# Patient Record
Sex: FEMALE | Race: WHITE | NOT HISPANIC OR LATINO | Employment: UNEMPLOYED | URBAN - METROPOLITAN AREA
[De-identification: names, ages, dates, MRNs, and addresses within clinical notes are randomized per-mention and may not be internally consistent; named-entity substitution may affect disease eponyms.]

---

## 2017-03-02 ENCOUNTER — CONVERSION ENCOUNTER (OUTPATIENT)
Dept: RHEUMATOLOGY | Facility: CLINIC | Age: 62
End: 2017-03-02

## 2017-03-02 LAB
ALBUMIN SERPL-MCNC: 4.7 G/DL (ref 3.6–5.1)
ALBUMIN/GLOB SERPL: ABNORMAL {RATIO} (ref 1–2.5)
ALP SERPL-CCNC: 106 UNITS/L (ref 33–130)
ALT SERPL-CCNC: 20 UNITS/L (ref 6–29)
ANA SER QL IA: NEGATIVE
ANTI-CARDIOLIPIN IGG ANTIBODY: <14
ANTI-CARDIOLIPIN IGM ANTIBODY: <12
AST SERPL-CCNC: 16 UNITS/L (ref 10–35)
BASOPHILS # BLD AUTO: ABNORMAL 10*3/MM3 (ref 0–200)
BASOPHILS NFR BLD AUTO: 0.2 %
BILIRUB SERPL-MCNC: 0.6 MG/DL (ref 0.2–1.2)
BUN SERPL-MCNC: 12 MG/DL (ref 7–25)
BUN/CREAT SERPL: ABNORMAL (ref 6–22)
C3 SERPL-MCNC: 173 MG/DL (ref 90–180)
C4 SERPL-MCNC: 31 MG/DL (ref 16–47)
CALCIUM SERPL-MCNC: 9.8 MG/DL (ref 8.6–10.4)
CARDIOLIPIN IGA SER IA-ACNC: <11
CHLORIDE SERPL-SCNC: 104 MMOL/L (ref 98–110)
CO2 CONTENT VENOUS: 30 MMOL/L (ref 20–31)
CONV COMMENT: 0.98
CONV NEUTROPHILS/100 LEUKOCYTES IN BODY FLUID BY MANUAL COUNT: 65.1 %
CONV TOTAL PROTEIN: 7.7 G/DL (ref 6.1–8.1)
CREAT UR-MCNC: 0.73 MG/DL (ref 0.5–0.99)
CRP SERPL-MCNC: 0.59 MG/DL
EOSINOPHIL # BLD AUTO: 0.6 %
EOSINOPHIL # BLD AUTO: ABNORMAL 10*3/MM3 (ref 15–500)
ERYTHROCYTE [DISTWIDTH] IN BLOOD BY AUTOMATED COUNT: 13.6 % (ref 11–15)
ERYTHROCYTE [SEDIMENTATION RATE] IN BLOOD BY WESTERGREN METHOD: 17 MM/HR
GLOBULIN UR ELPH-MCNC: ABNORMAL G/DL (ref 1.9–3.7)
GLUCOSE SERPL-MCNC: 131 MG/DL (ref 65–99)
HBV CORE AB SER DONR QL IA: ABNORMAL
HBV CORE AB SER DONR QL IA: ABNORMAL
HBV SURFACE AB SER QL: ABNORMAL
HBV SURFACE AG SERPL QL IA: ABNORMAL
HCT VFR BLD AUTO: 44.5 % (ref 35–45)
HCV AB SER DONR QL: ABNORMAL
HGB BLD-MCNC: 14.6 G/DL (ref 11.7–15.5)
LYMPHOCYTES # BLD AUTO: ABNORMAL 10*3/MM3 (ref 850–3900)
LYMPHOCYTES NFR BLD AUTO: 29.2 %
MCH RBC QN AUTO: 28 PG (ref 27–33)
MCHC RBC AUTO-ENTMCNC: ABNORMAL % (ref 32–36)
MCV RBC AUTO: 85.2 FL (ref 80–100)
MONOCYTES # BLD AUTO: ABNORMAL 10*3/MICROLITER (ref 200–950)
MONOCYTES NFR BLD AUTO: 4.9 %
NEUTROPHILS # BLD AUTO: ABNORMAL 10*3/MM3 (ref 1500–7800)
PLATELET # BLD AUTO: ABNORMAL 10*3/MM3 (ref 140–400)
PMV BLD AUTO: 10.8 FL (ref 7.5–12.5)
POTASSIUM SERPL-SCNC: 4 MMOL/L (ref 3.5–5.3)
RBC # BLD AUTO: ABNORMAL 10*6/MM3 (ref 3.8–5.1)
SODIUM SERPL-SCNC: 140 MMOL/L (ref 135–146)
T4 SERPL-MCNC: 9.5 MCG/DL (ref 4.5–12)
THYROGLOB SERPL-MCNC: ABNORMAL NG/ML
TSH SERPL-ACNC: 1.11 MICROINTL UNITS/ML (ref 0.4–4.5)
WBC # BLD AUTO: ABNORMAL K/UL (ref 3.8–10.8)

## 2018-03-14 ENCOUNTER — ON CAMPUS - OUTPATIENT (OUTPATIENT)
Dept: URBAN - METROPOLITAN AREA HOSPITAL 85 | Facility: HOSPITAL | Age: 63
End: 2018-03-14

## 2018-03-14 ENCOUNTER — HOSPITAL ENCOUNTER (OUTPATIENT)
Dept: PREOP | Facility: HOSPITAL | Age: 63
Setting detail: HOSPITAL OUTPATIENT SURGERY
Discharge: HOME | End: 2018-03-14
Attending: INTERNAL MEDICINE | Admitting: INTERNAL MEDICINE

## 2018-03-14 DIAGNOSIS — R93.3 ABNORMAL FINDINGS ON DIAGNOSTIC IMAGING OF OTHER PARTS OF DI: ICD-10-CM

## 2018-03-14 DIAGNOSIS — K62.5 HEMORRHAGE OF ANUS AND RECTUM: ICD-10-CM

## 2018-03-14 DIAGNOSIS — D12.4 BENIGN NEOPLASM OF DESCENDING COLON: ICD-10-CM

## 2018-03-14 DIAGNOSIS — Z91.19 PATIENT'S NONCOMPLIANCE WITH OTHER MEDICAL TREATMENT AND REG: ICD-10-CM

## 2018-03-14 DIAGNOSIS — R19.4 CHANGE IN BOWEL HABIT: ICD-10-CM

## 2018-03-14 PROCEDURE — 45385 COLONOSCOPY W/LESION REMOVAL: CPT | Performed by: INTERNAL MEDICINE

## 2018-03-16 ENCOUNTER — HOSPITAL ENCOUNTER (OUTPATIENT)
Dept: PREOP | Facility: HOSPITAL | Age: 63
Setting detail: HOSPITAL OUTPATIENT SURGERY
Discharge: HOME | End: 2018-03-16
Attending: INTERNAL MEDICINE | Admitting: INTERNAL MEDICINE

## 2018-03-16 PROCEDURE — 45385 COLONOSCOPY W/LESION REMOVAL: CPT | Mod: 76 | Performed by: INTERNAL MEDICINE

## 2021-11-18 ENCOUNTER — APPOINTMENT (OUTPATIENT)
Dept: CT IMAGING | Facility: HOSPITAL | Age: 66
End: 2021-11-18

## 2021-11-18 ENCOUNTER — HOSPITAL ENCOUNTER (INPATIENT)
Facility: HOSPITAL | Age: 66
LOS: 3 days | Discharge: NURSING, REHAB OR LONG-TERM CARE | End: 2021-11-21
Attending: INTERNAL MEDICINE | Admitting: INTERNAL MEDICINE

## 2021-11-18 DIAGNOSIS — R10.32 LEFT LOWER QUADRANT ABDOMINAL PAIN: Primary | ICD-10-CM

## 2021-11-18 DIAGNOSIS — F41.1 GAD (GENERALIZED ANXIETY DISORDER): ICD-10-CM

## 2021-11-18 DIAGNOSIS — R50.9 FEVER, UNSPECIFIED FEVER CAUSE: ICD-10-CM

## 2021-11-18 DIAGNOSIS — N30.01 ACUTE CYSTITIS WITH HEMATURIA: ICD-10-CM

## 2021-11-18 DIAGNOSIS — K65.1 INTRA-ABDOMINAL ABSCESS (HCC): ICD-10-CM

## 2021-11-18 DIAGNOSIS — G89.4 CHRONIC PAIN SYNDROME: ICD-10-CM

## 2021-11-18 DIAGNOSIS — Z85.44 HISTORY OF CANCER OF VULVA: ICD-10-CM

## 2021-11-18 PROBLEM — C51.9 MALIGNANT NEOPLASM OF VULVA (HCC): Chronic | Status: ACTIVE | Noted: 2021-11-18

## 2021-11-18 PROBLEM — C80.1: Chronic | Status: ACTIVE | Noted: 2021-08-30

## 2021-11-18 PROBLEM — N39.0 ACUTE UTI (URINARY TRACT INFECTION): Status: ACTIVE | Noted: 2021-11-18

## 2021-11-18 PROBLEM — D75.839 THROMBOCYTOSIS: Status: ACTIVE | Noted: 2021-11-18

## 2021-11-18 PROBLEM — L02.211 ABSCESS OF ABDOMINAL WALL: Status: ACTIVE | Noted: 2021-11-18

## 2021-11-18 PROBLEM — E11.9 TYPE 2 DIABETES MELLITUS (HCC): Status: ACTIVE | Noted: 2021-06-02

## 2021-11-18 PROBLEM — D64.9 ANEMIA: Status: ACTIVE | Noted: 2021-11-18

## 2021-11-18 PROBLEM — C51.9 MALIGNANT NEOPLASM OF VULVA: Status: ACTIVE | Noted: 2021-11-18

## 2021-11-18 PROBLEM — E11.9 TYPE 2 DIABETES MELLITUS (HCC): Chronic | Status: ACTIVE | Noted: 2021-06-02

## 2021-11-18 PROBLEM — C80.1: Status: ACTIVE | Noted: 2021-08-30

## 2021-11-18 LAB
ALBUMIN SERPL-MCNC: 2 G/DL (ref 3.5–5.2)
ALBUMIN/GLOB SERPL: 0.4 G/DL
ALP SERPL-CCNC: 175 U/L (ref 39–117)
ALT SERPL W P-5'-P-CCNC: <5 U/L (ref 1–33)
ANION GAP SERPL CALCULATED.3IONS-SCNC: 11 MMOL/L (ref 5–15)
AST SERPL-CCNC: 9 U/L (ref 1–32)
BACTERIA UR QL AUTO: ABNORMAL /HPF
BASOPHILS # BLD AUTO: 0.1 10*3/MM3 (ref 0–0.2)
BASOPHILS NFR BLD AUTO: 0.7 % (ref 0–1.5)
BILIRUB SERPL-MCNC: 0.5 MG/DL (ref 0–1.2)
BILIRUB UR QL STRIP: NEGATIVE
BUN SERPL-MCNC: 15 MG/DL (ref 8–23)
BUN/CREAT SERPL: 20.8 (ref 7–25)
CALCIUM SPEC-SCNC: 8.5 MG/DL (ref 8.6–10.5)
CHLORIDE SERPL-SCNC: 88 MMOL/L (ref 98–107)
CLARITY UR: ABNORMAL
CO2 SERPL-SCNC: 35 MMOL/L (ref 22–29)
COLOR UR: ABNORMAL
CREAT SERPL-MCNC: 0.72 MG/DL (ref 0.57–1)
D-LACTATE SERPL-SCNC: 1.2 MMOL/L (ref 0.5–2)
DEPRECATED RDW RBC AUTO: 45.9 FL (ref 37–54)
EOSINOPHIL # BLD AUTO: 0 10*3/MM3 (ref 0–0.4)
EOSINOPHIL NFR BLD AUTO: 0 % (ref 0.3–6.2)
ERYTHROCYTE [DISTWIDTH] IN BLOOD BY AUTOMATED COUNT: 17.6 % (ref 12.3–15.4)
FERRITIN SERPL-MCNC: 555.6 NG/ML (ref 13–150)
FOLATE SERPL-MCNC: 4.09 NG/ML (ref 4.78–24.2)
GFR SERPL CREATININE-BSD FRML MDRD: 81 ML/MIN/1.73
GLOBULIN UR ELPH-MCNC: 5.3 GM/DL
GLUCOSE BLDC GLUCOMTR-MCNC: 180 MG/DL (ref 70–105)
GLUCOSE BLDC GLUCOMTR-MCNC: 193 MG/DL (ref 70–105)
GLUCOSE BLDC GLUCOMTR-MCNC: 205 MG/DL (ref 70–105)
GLUCOSE SERPL-MCNC: 191 MG/DL (ref 65–99)
GLUCOSE UR STRIP-MCNC: NEGATIVE MG/DL
HCT VFR BLD AUTO: 28.4 % (ref 34–46.6)
HGB BLD-MCNC: 8.9 G/DL (ref 12–15.9)
HGB UR QL STRIP.AUTO: ABNORMAL
HOLD SPECIMEN: NORMAL
HOLD SPECIMEN: NORMAL
HYALINE CASTS UR QL AUTO: ABNORMAL /LPF
IRON 24H UR-MRATE: 16 MCG/DL (ref 37–145)
KETONES UR QL STRIP: NEGATIVE
LEUKOCYTE ESTERASE UR QL STRIP.AUTO: ABNORMAL
LIPASE SERPL-CCNC: 8 U/L (ref 13–60)
LYMPHOCYTES # BLD AUTO: 1.1 10*3/MM3 (ref 0.7–3.1)
LYMPHOCYTES NFR BLD AUTO: 5.6 % (ref 19.6–45.3)
MAGNESIUM SERPL-MCNC: 1.6 MG/DL (ref 1.6–2.4)
MCH RBC QN AUTO: 23.2 PG (ref 26.6–33)
MCHC RBC AUTO-ENTMCNC: 31.4 G/DL (ref 31.5–35.7)
MCV RBC AUTO: 73.9 FL (ref 79–97)
MONOCYTES # BLD AUTO: 1.4 10*3/MM3 (ref 0.1–0.9)
MONOCYTES NFR BLD AUTO: 7.2 % (ref 5–12)
NEUTROPHILS NFR BLD AUTO: 16.9 10*3/MM3 (ref 1.7–7)
NEUTROPHILS NFR BLD AUTO: 86.5 % (ref 42.7–76)
NITRITE UR QL STRIP: NEGATIVE
NRBC BLD AUTO-RTO: 0.1 /100 WBC (ref 0–0.2)
PH UR STRIP.AUTO: 5.5 [PH] (ref 5–8)
PLATELET # BLD AUTO: 517 10*3/MM3 (ref 140–450)
PMV BLD AUTO: 6.8 FL (ref 6–12)
POTASSIUM SERPL-SCNC: 3.5 MMOL/L (ref 3.5–5.2)
PROT SERPL-MCNC: 7.3 G/DL (ref 6–8.5)
PROT UR QL STRIP: ABNORMAL
RBC # BLD AUTO: 3.85 10*6/MM3 (ref 3.77–5.28)
RBC # UR STRIP: ABNORMAL /HPF
REF LAB TEST METHOD: ABNORMAL
SARS-COV-2 RNA PNL SPEC NAA+PROBE: NOT DETECTED
SODIUM SERPL-SCNC: 134 MMOL/L (ref 136–145)
SP GR UR STRIP: 1.02 (ref 1–1.03)
SQUAMOUS #/AREA URNS HPF: ABNORMAL /HPF
UROBILINOGEN UR QL STRIP: ABNORMAL
VIT B12 BLD-MCNC: 319 PG/ML (ref 211–946)
WBC # UR STRIP: ABNORMAL /HPF
WBC NRBC COR # BLD: 19.5 10*3/MM3 (ref 3.4–10.8)
WHOLE BLOOD HOLD SPECIMEN: NORMAL
WHOLE BLOOD HOLD SPECIMEN: NORMAL

## 2021-11-18 PROCEDURE — 99221 1ST HOSP IP/OBS SF/LOW 40: CPT | Performed by: SURGERY

## 2021-11-18 PROCEDURE — 83605 ASSAY OF LACTIC ACID: CPT

## 2021-11-18 PROCEDURE — 81001 URINALYSIS AUTO W/SCOPE: CPT | Performed by: NURSE PRACTITIONER

## 2021-11-18 PROCEDURE — 83036 HEMOGLOBIN GLYCOSYLATED A1C: CPT | Performed by: NURSE PRACTITIONER

## 2021-11-18 PROCEDURE — 25010000002 HYDROMORPHONE PER 4 MG: Performed by: NURSE PRACTITIONER

## 2021-11-18 PROCEDURE — 74177 CT ABD & PELVIS W/CONTRAST: CPT

## 2021-11-18 PROCEDURE — 87147 CULTURE TYPE IMMUNOLOGIC: CPT | Performed by: RADIOLOGY

## 2021-11-18 PROCEDURE — 75989 ABSCESS DRAINAGE UNDER X-RAY: CPT

## 2021-11-18 PROCEDURE — 49406 IMAGE CATH FLUID PERI/RETRO: CPT

## 2021-11-18 PROCEDURE — 82728 ASSAY OF FERRITIN: CPT | Performed by: NURSE PRACTITIONER

## 2021-11-18 PROCEDURE — 87635 SARS-COV-2 COVID-19 AMP PRB: CPT | Performed by: INTERNAL MEDICINE

## 2021-11-18 PROCEDURE — 80053 COMPREHEN METABOLIC PANEL: CPT | Performed by: NURSE PRACTITIONER

## 2021-11-18 PROCEDURE — 36415 COLL VENOUS BLD VENIPUNCTURE: CPT

## 2021-11-18 PROCEDURE — 99222 1ST HOSP IP/OBS MODERATE 55: CPT | Performed by: NURSE PRACTITIONER

## 2021-11-18 PROCEDURE — 99284 EMERGENCY DEPT VISIT MOD MDM: CPT

## 2021-11-18 PROCEDURE — 82746 ASSAY OF FOLIC ACID SERUM: CPT | Performed by: NURSE PRACTITIONER

## 2021-11-18 PROCEDURE — 25010000002 MIDAZOLAM PER 1 MG: Performed by: RADIOLOGY

## 2021-11-18 PROCEDURE — 83735 ASSAY OF MAGNESIUM: CPT | Performed by: NURSE PRACTITIONER

## 2021-11-18 PROCEDURE — 25010000002 FENTANYL CITRATE (PF) 50 MCG/ML SOLUTION: Performed by: RADIOLOGY

## 2021-11-18 PROCEDURE — 25010000002 PIPERACILLIN SOD-TAZOBACTAM PER 1 G: Performed by: NURSE PRACTITIONER

## 2021-11-18 PROCEDURE — 99152 MOD SED SAME PHYS/QHP 5/>YRS: CPT

## 2021-11-18 PROCEDURE — 83540 ASSAY OF IRON: CPT | Performed by: NURSE PRACTITIONER

## 2021-11-18 PROCEDURE — 85025 COMPLETE CBC W/AUTO DIFF WBC: CPT | Performed by: NURSE PRACTITIONER

## 2021-11-18 PROCEDURE — 87086 URINE CULTURE/COLONY COUNT: CPT | Performed by: INTERNAL MEDICINE

## 2021-11-18 PROCEDURE — 82607 VITAMIN B-12: CPT | Performed by: NURSE PRACTITIONER

## 2021-11-18 PROCEDURE — 0 LIDOCAINE 1 % SOLUTION: Performed by: RADIOLOGY

## 2021-11-18 PROCEDURE — 87070 CULTURE OTHR SPECIMN AEROBIC: CPT | Performed by: RADIOLOGY

## 2021-11-18 PROCEDURE — 99153 MOD SED SAME PHYS/QHP EA: CPT

## 2021-11-18 PROCEDURE — 82962 GLUCOSE BLOOD TEST: CPT

## 2021-11-18 PROCEDURE — 0W9G30Z DRAINAGE OF PERITONEAL CAVITY WITH DRAINAGE DEVICE, PERCUTANEOUS APPROACH: ICD-10-PCS | Performed by: RADIOLOGY

## 2021-11-18 PROCEDURE — C1769 GUIDE WIRE: HCPCS

## 2021-11-18 PROCEDURE — C1819 TISSUE LOCALIZATION-EXCISION: HCPCS

## 2021-11-18 PROCEDURE — 87205 SMEAR GRAM STAIN: CPT | Performed by: RADIOLOGY

## 2021-11-18 PROCEDURE — 0 IOPAMIDOL PER 1 ML: Performed by: NURSE PRACTITIONER

## 2021-11-18 PROCEDURE — 83690 ASSAY OF LIPASE: CPT | Performed by: NURSE PRACTITIONER

## 2021-11-18 PROCEDURE — 87040 BLOOD CULTURE FOR BACTERIA: CPT | Performed by: NURSE PRACTITIONER

## 2021-11-18 PROCEDURE — C1729 CATH, DRAINAGE: HCPCS

## 2021-11-18 RX ORDER — ALUMINA, MAGNESIA, AND SIMETHICONE 2400; 2400; 240 MG/30ML; MG/30ML; MG/30ML
15 SUSPENSION ORAL EVERY 6 HOURS PRN
Status: DISCONTINUED | OUTPATIENT
Start: 2021-11-18 | End: 2021-11-21 | Stop reason: HOSPADM

## 2021-11-18 RX ORDER — POTASSIUM CHLORIDE 20 MEQ/1
40 TABLET, EXTENDED RELEASE ORAL AS NEEDED
Status: DISCONTINUED | OUTPATIENT
Start: 2021-11-18 | End: 2021-11-21 | Stop reason: HOSPADM

## 2021-11-18 RX ORDER — POTASSIUM CHLORIDE 7.45 MG/ML
10 INJECTION INTRAVENOUS
Status: DISCONTINUED | OUTPATIENT
Start: 2021-11-18 | End: 2021-11-21 | Stop reason: HOSPADM

## 2021-11-18 RX ORDER — ONDANSETRON 4 MG/1
4 TABLET, FILM COATED ORAL EVERY 6 HOURS PRN
Status: DISCONTINUED | OUTPATIENT
Start: 2021-11-18 | End: 2021-11-21 | Stop reason: HOSPADM

## 2021-11-18 RX ORDER — LIDOCAINE HYDROCHLORIDE 10 MG/ML
INJECTION, SOLUTION INFILTRATION; PERINEURAL
Status: COMPLETED | OUTPATIENT
Start: 2021-11-18 | End: 2021-11-18

## 2021-11-18 RX ORDER — CHOLECALCIFEROL (VITAMIN D3) 125 MCG
5 CAPSULE ORAL NIGHTLY PRN
Status: DISCONTINUED | OUTPATIENT
Start: 2021-11-18 | End: 2021-11-21 | Stop reason: HOSPADM

## 2021-11-18 RX ORDER — LIDOCAINE HYDROCHLORIDE 20 MG/ML
JELLY TOPICAL AS NEEDED
Status: DISCONTINUED | OUTPATIENT
Start: 2021-11-18 | End: 2021-11-21 | Stop reason: HOSPADM

## 2021-11-18 RX ORDER — MAGNESIUM SULFATE HEPTAHYDRATE 40 MG/ML
4 INJECTION, SOLUTION INTRAVENOUS AS NEEDED
Status: DISCONTINUED | OUTPATIENT
Start: 2021-11-18 | End: 2021-11-21 | Stop reason: HOSPADM

## 2021-11-18 RX ORDER — INSULIN LISPRO 100 [IU]/ML
0-7 INJECTION, SOLUTION INTRAVENOUS; SUBCUTANEOUS
Status: DISCONTINUED | OUTPATIENT
Start: 2021-11-18 | End: 2021-11-21 | Stop reason: HOSPADM

## 2021-11-18 RX ORDER — SODIUM CHLORIDE 0.9 % (FLUSH) 0.9 %
10 SYRINGE (ML) INJECTION AS NEEDED
Status: DISCONTINUED | OUTPATIENT
Start: 2021-11-18 | End: 2021-11-21 | Stop reason: HOSPADM

## 2021-11-18 RX ORDER — ONDANSETRON 2 MG/ML
4 INJECTION INTRAMUSCULAR; INTRAVENOUS ONCE
Status: DISCONTINUED | OUTPATIENT
Start: 2021-11-18 | End: 2021-11-18

## 2021-11-18 RX ORDER — MAGNESIUM SULFATE HEPTAHYDRATE 40 MG/ML
2 INJECTION, SOLUTION INTRAVENOUS AS NEEDED
Status: DISCONTINUED | OUTPATIENT
Start: 2021-11-18 | End: 2021-11-21 | Stop reason: HOSPADM

## 2021-11-18 RX ORDER — DEXTROSE MONOHYDRATE 25 G/50ML
25 INJECTION, SOLUTION INTRAVENOUS
Status: DISCONTINUED | OUTPATIENT
Start: 2021-11-18 | End: 2021-11-21 | Stop reason: HOSPADM

## 2021-11-18 RX ORDER — POTASSIUM CHLORIDE 1.5 G/1.77G
40 POWDER, FOR SOLUTION ORAL AS NEEDED
Status: DISCONTINUED | OUTPATIENT
Start: 2021-11-18 | End: 2021-11-21 | Stop reason: HOSPADM

## 2021-11-18 RX ORDER — ACETAMINOPHEN 325 MG/1
650 TABLET ORAL EVERY 4 HOURS PRN
Status: DISCONTINUED | OUTPATIENT
Start: 2021-11-18 | End: 2021-11-21 | Stop reason: HOSPADM

## 2021-11-18 RX ORDER — ACETAMINOPHEN 160 MG/5ML
650 SOLUTION ORAL EVERY 4 HOURS PRN
Status: DISCONTINUED | OUTPATIENT
Start: 2021-11-18 | End: 2021-11-21 | Stop reason: HOSPADM

## 2021-11-18 RX ORDER — ONDANSETRON 2 MG/ML
4 INJECTION INTRAMUSCULAR; INTRAVENOUS EVERY 6 HOURS PRN
Status: DISCONTINUED | OUTPATIENT
Start: 2021-11-18 | End: 2021-11-21 | Stop reason: HOSPADM

## 2021-11-18 RX ORDER — ACETAMINOPHEN 650 MG/1
650 SUPPOSITORY RECTAL EVERY 4 HOURS PRN
Status: DISCONTINUED | OUTPATIENT
Start: 2021-11-18 | End: 2021-11-21 | Stop reason: HOSPADM

## 2021-11-18 RX ORDER — HYDROMORPHONE HYDROCHLORIDE 1 MG/ML
1 SOLUTION ORAL EVERY 4 HOURS PRN
Status: ON HOLD | COMMUNITY
End: 2021-11-21 | Stop reason: SDUPTHER

## 2021-11-18 RX ORDER — OLANZAPINE 10 MG/2ML
1 INJECTION, POWDER, LYOPHILIZED, FOR SOLUTION INTRAMUSCULAR AS NEEDED
Status: DISCONTINUED | OUTPATIENT
Start: 2021-11-18 | End: 2021-11-21 | Stop reason: HOSPADM

## 2021-11-18 RX ORDER — SODIUM CHLORIDE 0.9 % (FLUSH) 0.9 %
10 SYRINGE (ML) INJECTION EVERY 12 HOURS SCHEDULED
Status: DISCONTINUED | OUTPATIENT
Start: 2021-11-18 | End: 2021-11-21 | Stop reason: HOSPADM

## 2021-11-18 RX ORDER — HYDROMORPHONE HCL 110MG/55ML
1 PATIENT CONTROLLED ANALGESIA SYRINGE INTRAVENOUS ONCE
Status: COMPLETED | OUTPATIENT
Start: 2021-11-18 | End: 2021-11-18

## 2021-11-18 RX ORDER — HYDROMORPHONE HCL 110MG/55ML
1 PATIENT CONTROLLED ANALGESIA SYRINGE INTRAVENOUS EVERY 4 HOURS PRN
Status: DISCONTINUED | OUTPATIENT
Start: 2021-11-18 | End: 2021-11-21 | Stop reason: HOSPADM

## 2021-11-18 RX ORDER — NICOTINE POLACRILEX 4 MG
15 LOZENGE BUCCAL
Status: DISCONTINUED | OUTPATIENT
Start: 2021-11-18 | End: 2021-11-21 | Stop reason: HOSPADM

## 2021-11-18 RX ORDER — IBUPROFEN 800 MG/1
800 TABLET ORAL EVERY 8 HOURS PRN
Status: ON HOLD | COMMUNITY
End: 2021-12-04

## 2021-11-18 RX ORDER — INSULIN LISPRO 100 [IU]/ML
0-7 INJECTION, SOLUTION INTRAVENOUS; SUBCUTANEOUS AS NEEDED
Status: DISCONTINUED | OUTPATIENT
Start: 2021-11-18 | End: 2021-11-21 | Stop reason: HOSPADM

## 2021-11-18 RX ORDER — DIPHENHYDRAMINE HYDROCHLORIDE 50 MG/ML
25 INJECTION INTRAMUSCULAR; INTRAVENOUS ONCE
Status: DISCONTINUED | OUTPATIENT
Start: 2021-11-18 | End: 2021-11-21 | Stop reason: HOSPADM

## 2021-11-18 RX ORDER — MIDAZOLAM HYDROCHLORIDE 1 MG/ML
INJECTION INTRAMUSCULAR; INTRAVENOUS
Status: COMPLETED | OUTPATIENT
Start: 2021-11-18 | End: 2021-11-18

## 2021-11-18 RX ORDER — FENTANYL CITRATE 50 UG/ML
INJECTION, SOLUTION INTRAMUSCULAR; INTRAVENOUS
Status: COMPLETED | OUTPATIENT
Start: 2021-11-18 | End: 2021-11-18

## 2021-11-18 RX ADMIN — IOPAMIDOL 100 ML: 755 INJECTION, SOLUTION INTRAVENOUS at 13:31

## 2021-11-18 RX ADMIN — SODIUM CHLORIDE, PRESERVATIVE FREE 10 ML: 5 INJECTION INTRAVENOUS at 21:22

## 2021-11-18 RX ADMIN — HYDROMORPHONE HYDROCHLORIDE 1 MG: 2 INJECTION, SOLUTION INTRAMUSCULAR; INTRAVENOUS; SUBCUTANEOUS at 21:21

## 2021-11-18 RX ADMIN — PIPERACILLIN AND TAZOBACTAM 3.38 G: 3; .375 INJECTION, POWDER, LYOPHILIZED, FOR SOLUTION INTRAVENOUS at 15:17

## 2021-11-18 RX ADMIN — LIDOCAINE HYDROCHLORIDE 6 ML: 10 INJECTION, SOLUTION INFILTRATION; PERINEURAL at 16:07

## 2021-11-18 RX ADMIN — MIDAZOLAM 0.5 MG: 1 INJECTION INTRAMUSCULAR; INTRAVENOUS at 15:58

## 2021-11-18 RX ADMIN — MIDAZOLAM 0.5 MG: 1 INJECTION INTRAMUSCULAR; INTRAVENOUS at 16:01

## 2021-11-18 RX ADMIN — PIPERACILLIN AND TAZOBACTAM 3.38 G: 3; .375 INJECTION, POWDER, LYOPHILIZED, FOR SOLUTION INTRAVENOUS at 21:22

## 2021-11-18 RX ADMIN — SODIUM CHLORIDE, POTASSIUM CHLORIDE, SODIUM LACTATE AND CALCIUM CHLORIDE 1000 ML: 600; 310; 30; 20 INJECTION, SOLUTION INTRAVENOUS at 12:00

## 2021-11-18 RX ADMIN — FENTANYL CITRATE 50 MCG: 50 INJECTION, SOLUTION INTRAMUSCULAR; INTRAVENOUS at 15:58

## 2021-11-18 RX ADMIN — HYDROMORPHONE HYDROCHLORIDE 1 MG: 2 INJECTION, SOLUTION INTRAMUSCULAR; INTRAVENOUS; SUBCUTANEOUS at 11:59

## 2021-11-18 RX ADMIN — FENTANYL CITRATE 50 MCG: 50 INJECTION, SOLUTION INTRAMUSCULAR; INTRAVENOUS at 16:01

## 2021-11-18 NOTE — NURSING NOTE
Patient has history of vulvar cancer and multiple surgeries in the perineum. She has redness and white patches inside and out of the vagina. Patient refused a picture and another nurse to view area.

## 2021-11-18 NOTE — ED NOTES
Patient came to the ED for abdominal pain.  EMS reports that hospice called them to bring her here to get a luna cath placed.  On the way here patient started complaining of new onset of abdominal pain.  Patient is tender to touch on the left lower and upper abdomen.  Patient states that she wants to be put asleep when placing the luna cath.  She mentioned her oncologist would not place her luna cath unless she is asleep. Call light and room phone are in reach of patient.      Aleyda Grewal RN  11/18/21 1107       Aleyda Grewal RN  11/18/21 1101

## 2021-11-18 NOTE — CONSULTS
GENERAL SURGERY CONSULT      Patient Care Team:  Galileo Roman MD as PCP - General    Chief complaint left abdominal pain  Subjective     This is a 66-year-old lady who for the past few weeks has been experiencing several left-sided abdominal pain.  It became more painful over the last few days and she came to the emergency room.  She is actually under hospice care.  She has a history of vulvar cancer and underwent vulvectomy and left inguinal lymph node dissection by Dr. Ruvalcaba about 4 years ago.  She did complete chemo and radiation in 2020.  She has had no other abdominal surgical history.    While in the emergency room a CT scan was performed which demonstrates a very large fluid collection on the left side of the abdomen and left lower quadrant.    Review of Systems   All systems were reviewed and negative except for:  Gastrointestinal: positive for  , pain and See HPI    History  Past Medical History:   Diagnosis Date   • Diabetes mellitus (HCC)    • Vulva cancer (HCC)      History reviewed. No pertinent surgical history.  History reviewed. No pertinent family history.  Social History     Tobacco Use   • Smoking status: Not on file   • Smokeless tobacco: Not on file   Substance Use Topics   • Alcohol use: Not on file   • Drug use: Not on file     (Not in a hospital admission)    Allergies:  Meperidine    Objective     Vital Signs  Temp:  [98.7 °F (37.1 °C)] 98.7 °F (37.1 °C)  Heart Rate:  [90-96] 96  Resp:  [16-20] 20  BP: (150)/(61) 150/61    Physical Exam:      General Appearance:    Alert, cooperative, in no acute distress   Head:    Normocephalic, without obvious abnormality, atraumatic,    Eyes:            Lids and lashes normal, conjunctivae and sclerae normal, no   icterus, no pallor, corneas clear, PERRLA   Ears:    Ears appear intact with no abnormalities noted   Throat:   No oral lesions, no thrush, oral mucosa moist   Neck:   No adenopathy, supple, trachea midline, no thyromegaly, no    carotid bruit, no JVD   Back:     No kyphosis present, no scoliosis present, no skin lesions,      erythema or scars, no tenderness to percussion or                   palpation,   range of motion normal   Lungs:     Clear to auscultation,respirations regular, even and                  unlabored    Heart:    Regular rhythm and normal rate, normal S1 and S2, no            murmur, no gallop, no rub, no click   Chest Wall:    No abnormalities observed   Abdomen:    Mild tenderness left lower quadrant and left side abdomen but no guarding and no rebound.  There is a fluctuant feel to the left side of the abdomen.   Rectal:     Deferred   Extremities: No edema, good ROM   Pulses:   Pulses palpable and equal bilaterally   Skin:   No bleeding, bruising or rash   Lymph nodes:   No palpable adenopathy   Neurologic:   Cranial nerves 2 - 12 grossly intact, sensation intact, DTR       present and equal bilaterally     Lab Results (last 24 hours)     Procedure Component Value Units Date/Time    Blood Culture - Blood, Arm, Right [[id number]] Collected: 11/18/21 1449    Specimen: Blood from Arm, Right Updated: 11/18/21 1453    Blood Culture - Blood, Arm, Right [[id number]] Collected: 11/18/21 1358    Specimen: Blood from Arm, Right Updated: 11/18/21 1359    Urinalysis, Microscopic Only - Urine, Clean Catch [[id number]]  (Abnormal) Collected: 11/18/21 1305    Specimen: Urine, Clean Catch Updated: 11/18/21 1316     RBC, UA 13-20 /HPF      WBC, UA Too Numerous to Count /HPF      Bacteria, UA 2+ /HPF      Squamous Epithelial Cells, UA 3-6 /HPF      Hyaline Casts, UA None Seen /LPF      Methodology Manual Light Microscopy    [place] Draw [[id number]] Collected: 11/18/21 1204    Specimen: Blood Updated: 11/18/21 1315    Narrative:      The following orders were created for panel order [place] Draw.  Procedure                               Abnormality         Status                     ---------                               -----------          ------                     Green Top (Gel)[[id number]]                                  Final result               Lavender Top[[id number]]                                     Final result               Gold Top - SST[[id number]]                                   Final result               Light Blue Top[[id number]]                                   Final result                 Please view results for these tests on the individual orders.    Gold Top - SST [[id number]] Collected: 11/18/21 1204    Specimen: Blood Updated: 11/18/21 1315     Extra Tube Hold for add-ons.     Comment: Auto resulted.       Green Top (Gel) [[id number]] Collected: 11/18/21 1204    Specimen: Blood Updated: 11/18/21 1315     Extra Tube Hold for add-ons.     Comment: Auto resulted.       Light Blue Top [[id number]] Collected: 11/18/21 1204    Specimen: Blood Updated: 11/18/21 1315     Extra Tube hold for add-on     Comment: Auto resulted       Urinalysis With Microscopic If Indicated (No Culture) - Urine, Clean Catch [[id number]]  (Abnormal) Collected: 11/18/21 1305    Specimen: Urine, Clean Catch Updated: 11/18/21 1313     Color, UA Dark Yellow     Appearance, UA Turbid     Comment: Result checked         pH, UA 5.5     Specific Gravity, UA 1.019     Glucose, UA Negative     Ketones, UA Negative     Bilirubin, UA Negative     Blood, UA Large (3+)     Protein,  mg/dL (2+)     Leuk Esterase, UA Large (3+)     Nitrite, UA Negative     Urobilinogen, UA 1.0 E.U./dL    POC Glucose Once [[id number]]  (Abnormal) Collected: 11/18/21 1303    Specimen: Blood Updated: 11/18/21 1304     Glucose 193 mg/dL      Comment: Serial Number: [id number]:  [id number]       Comprehensive Metabolic Panel [[id number]]  (Abnormal) Collected: 11/18/21 1204    Specimen: Blood Updated: 11/18/21 1253     Glucose 191 mg/dL      BUN 15 mg/dL      Creatinine 0.72 mg/dL      Sodium 134 mmol/L      Potassium 3.5 mmol/L      Chloride 88 mmol/L      CO2 35.0 mmol/L       Calcium 8.5 mg/dL      Total Protein 7.3 g/dL      Albumin 2.00 g/dL      ALT (SGPT) <5 U/L      AST (SGOT) 9 U/L      Alkaline Phosphatase 175 U/L      Total Bilirubin 0.5 mg/dL      eGFR Non African Amer 81 mL/min/1.73      Globulin 5.3 gm/dL      A/G Ratio 0.4 g/dL      BUN/Creatinine Ratio 20.8     Anion Gap 11.0 mmol/L     Narrative:      GFR Normal >60  Chronic Kidney Disease <60  Kidney Failure <15      Lipase [[id number]]  (Abnormal) Collected: 11/18/21 1204    Specimen: Blood Updated: 11/18/21 1241     Lipase 8 U/L     CBC & Differential [[id number]]  (Abnormal) Collected: 11/18/21 1204    Specimen: Blood Updated: 11/18/21 1219    Narrative:      The following orders were created for panel order CBC & Differential.  Procedure                               Abnormality         Status                     ---------                               -----------         ------                     CBC Auto Differential[[id number]]        Abnormal            Final result               Scan Slide[[id number]]                                                                    Please view results for these tests on the individual orders.    CBC Auto Differential [[id number]]  (Abnormal) Collected: 11/18/21 1204    Specimen: Blood Updated: 11/18/21 1219     WBC 19.50 10*3/mm3      RBC 3.85 10*6/mm3      Hemoglobin 8.9 g/dL      Hematocrit 28.4 %      MCV 73.9 fL      MCH 23.2 pg      MCHC 31.4 g/dL      RDW 17.6 %      RDW-SD 45.9 fl      MPV 6.8 fL      Platelets 517 10*3/mm3      Neutrophil % 86.5 %      Lymphocyte % 5.6 %      Monocyte % 7.2 %      Eosinophil % 0.0 %      Basophil % 0.7 %      Neutrophils, Absolute 16.90 10*3/mm3      Lymphocytes, Absolute 1.10 10*3/mm3      Monocytes, Absolute 1.40 10*3/mm3      Eosinophils, Absolute 0.00 10*3/mm3      Basophils, Absolute 0.10 10*3/mm3      nRBC 0.1 /100 WBC     Lavender Top [[id number]] Collected: 11/18/21 1204    Specimen: Blood Updated: 11/18/21 1219     Extra Tube done     POC Lactate [[id number]]  (Normal) Collected: 11/18/21 1208    Specimen: Blood Updated: 11/18/21 1210     Lactate 1.2 mmol/L      Comment: Serial Number: [id number]:  [id number]             Imaging Results (Last 24 Hours)     Procedure Component Value Units Date/Time    CT Abdomen Pelvis With Contrast [[id number]] Collected: 11/18/21 1333     Updated: 11/18/21 1347    Narrative:      CT ABDOMEN PELVIS W CONTRAST-     Date of Exam: 11/18/2021 1:19 PM     Indication: Left lower quadrant pain, history of valvular cancer.     Comparison: None available.     Technique: CT scan of the abdomen and pelvis was performed after the  uneventful administration of 100 mL IV Isovue 370.  Automated exposure  control and iterative reconstruction methods were used.     FINDINGS:  The lung bases are clear.     There is a 9.3 x 10.8 x 25.3 cm lobulated rim-enhancing collection that  extends from the left lateral abdomen to involve the left iliacus and  left psoas muscles, as well as tracking along the left lateral pelvic  wall and extending inferiorly into the left obturator space as well as  the left posterior hip.     The uterus is surgically absent. There is soft tissue thickening with  ulceration involving the perineum.     There is hepatic steatosis. The gallbladder, adrenal glands, kidneys,  spleen, and pancreas are unremarkable.     The stomach appears normal. The small bowel appears normal in caliber  and configuration. The colon appears normal. The appendix is not  well-visualized. There is trace pelvic ascites. No abnormally enlarged  lymph nodes are identified.     The rectum and urinary bladder are unremarkable. There is infiltration  of the subcutaneous tissues along the left lateral hip likely secondary  to the collection described above.     No aggressive osseous lesions are identified.       Impression:      1.Soft tissue thickening with ulceration involving peritoneum, which  could represent known valvular cancer.  Recommend correlation with direct  inspection.  2.Large 25 cm lobulated rim-enhancing collection along left abdomen and  pelvis as described above, most concerning for an abscess. This would be  amenable to percutaneous drainage.  3.Trace pelvic free fluid.  4.Hepatic steatosis.     Electronically Signed By-Marvin Medina MD On:11/18/2021 1:45 PM  This report was finalized on [id number] by  Marvin Medina MD.          Results Review:    I reviewed the patient's new clinical results.  I reviewed the patient's new imaging results and agree with the interpretation.    Assessment/Plan       Recurrent malignant neoplasm (HCC)    Malignant neoplasm of vulva (HCC)    Type 2 diabetes mellitus (HCC)    Thrombocytosis    Left lower quadrant abdominal pain    66-year-old lady presents with very large fluid collection on the left side of her abdomen.  She has a past surgical history of left inguinal lymph node dissection and vulvectomy related to vulvar cancer and is completed chemo and radiation but that was 2 years ago.  She has had no constipation and no diarrhea.  She is urinating normally.    We will order interventional radiology to place percutaneous drain and then we will study the fluid for culture and sensitivity and other studies as indicated.  I spoke to hospitalist nurse practitioner and antibiotics will be started.      Jordana Carpenter MD  11/18/21  14:59 EST

## 2021-11-18 NOTE — NURSING NOTE
450cc total green/bloody fluid.  Drain to be connected to KATIE when down suturing catheter down.

## 2021-11-18 NOTE — H&P
[facility]      Patient Name: Carmella Pelayo  : 1955  MRN: [id number]  Primary Care Physician:  Galileo Roman MD  Date of admission: 2021      Subjective      Chief Complaint: Abdominal pain    History of Present Illness: Carmella Pelayo is a 66 y.o. female with a past medical history of vulvar cancer and type 2 diabetes mellitus who presented to [facility] on 2021 complaining of abdominal pain.  Patient explains she has had left-sided abdominal pain that started several weeks ago.  The pain has progressively gotten worse.  She reports she is on [facility] hospice at home, however still wants medical treatment.  She explains the pain feels very heavy.  Her current pain is a 0 out of 10.  Pain medication has helped.  She denies any aggravating factors.  She reports she had accompanied fever and chills.  She denies any recent nausea, vomiting, or diarrhea.  She is a history of a vulvectomy in 2019 and then her cancer came back and she underwent another surgery after that.  She is currently not receiving any chemo or radiation.    In the ED, CT abd/pelvis showed Soft tissue thickening with ulceration involving peritoneum, which could represent known valvular cancer. Recommend correlation with direct inspection.  Large 25 cm lobulated rim-enhancing collection along left abdomen and pelvis as described above, most concerning for an abscess. This would be amenable to percutaneous drainage.Trace pelvic free fluid.  Hepatic steatosis.  All labs unremarkable upon admission except sodium 134, calcium 8.5, white blood cells 19.5, hemoglobin 8.9, platelets 517.  Urinalysis showed large blood, large leukocytes, 100 protein, 13-20 red blood cells, too numerous to count white blood cells, 2+ bacteria, 36 squamous epithelial.  Blood culture x1 pending.  All vital signs stable upon admission.  Patient received Benadryl, Dilaudid, lactated Ringer 1 L,  "and Zopreetn in the ED.  General surgery consulted.    Review of Systems   Constitutional: Positive for chills and fever.   HENT: Negative.    Eyes: Negative.    Cardiovascular: Negative.    Respiratory: Negative.    Skin: Negative.    Musculoskeletal: Negative.    Gastrointestinal: Positive for abdominal pain.   Genitourinary: Negative.    Neurological: Negative.    Psychiatric/Behavioral: Negative.         Personal History     Past Medical History:   Diagnosis Date   • Diabetes mellitus (HCC)    • Vulva cancer (HCC)        Past Surgical History:   Procedure Laterality Date   • VULVECTOMY         Family History: family history is not on file. Otherwise pertinent FHx was reviewed and not pertinent to current issue.    Social History:  reports that she has never smoked. She has never used smokeless tobacco. She reports previous alcohol use. She reports previous drug use.    Home Medications:  Prior to Admission Medications     Prescriptions Last Dose Informant Patient Reported? Taking?    HYDROmorphone (DILAUDID) 2 MG tablet   Yes Yes    Take 1 mg by mouth Every 4 (Four) Hours As Needed for Moderate Pain .            Allergies:  Allergies   Allergen Reactions   • Meperidine Palpitations, Anxiety and Nausea And Vomiting     Other reaction(s): \"feel like I am going to pass out\", Feel like I am going to have panic attack, Irregular heart beat, Nausea         Objective      Vitals:   Temp:  [98.7 °F (37.1 °C)] 98.7 °F (37.1 °C)  Heart Rate:  [90-96] 96  Resp:  [16-20] 20  BP: (150)/(61) 150/61    Physical Exam  Vitals reviewed.   Constitutional:       Appearance: Normal appearance. She is normal weight.   HENT:      Head: Normocephalic and atraumatic.      Nose: Nose normal.      Mouth/Throat:      Mouth: Mucous membranes are moist.      Pharynx: Oropharynx is clear.   Eyes:      Extraocular Movements: Extraocular movements intact.      Conjunctiva/sclera: Conjunctivae normal.      Pupils: Pupils are equal, round, and " reactive to light.   Cardiovascular:      Rate and Rhythm: Normal rate and regular rhythm.      Pulses: Normal pulses.      Heart sounds: Normal heart sounds.      Comments: S1, S2 audible  Pulmonary:      Effort: Pulmonary effort is normal.      Breath sounds: Normal breath sounds.      Comments: On room air   Abdominal:      General: Abdomen is flat. Bowel sounds are normal.      Palpations: Abdomen is soft.      Tenderness: There is abdominal tenderness. There is guarding.      Comments: LUQ/LLQ tenderness   Musculoskeletal:         General: Normal range of motion.      Cervical back: Normal range of motion.      Left lower leg: Edema present.      Comments: +1 edema LLE   Skin:     General: Skin is warm and dry.   Neurological:      General: No focal deficit present.      Mental Status: She is alert and oriented to person, place, and time. Mental status is at baseline.   Psychiatric:         Mood and Affect: Mood normal.         Behavior: Behavior normal.         Thought Content: Thought content normal.         Judgment: Judgment normal.         Result Review    Result Review:  I have personally reviewed the results from the time of this admission to 11/18/2021 15:19 EST and agree with these findings:  [x]  Laboratory  []  Microbiology  [x]  Radiology  []  EKG/Telemetry   []  Cardiology/Vascular   []  Pathology  []  Old records  []  Other:  Most notable findings include: CT abd/pelvis showed Soft tissue thickening with ulceration involving peritoneum, which could represent known valvular cancer. Recommend correlation with direct inspection.  Large 25 cm lobulated rim-enhancing collection along left abdomen and pelvis as described above, most concerning for an abscess. This would be amenable to percutaneous drainage.Trace pelvic free fluid.  Hepatic steatosis.  All labs unremarkable upon admission except sodium 134, calcium 8.5, white blood cells 19.5, hemoglobin 8.9, platelets 517.  Urinalysis showed large  blood, large leukocytes, 100 protein, 13-20 red blood cells, too numerous to count white blood cells, 2+ bacteria, 36 squamous epithelial.     Assessment/Plan        Active Hospital Problems:  Active Hospital Problems    Diagnosis    • **Abscess of abdominal wall    • Thrombocytosis    • Left lower quadrant abdominal pain    • Acute UTI (urinary tract infection)    • Anemia    • Malignant neoplasm of vulva (HCC)      Squamous cell carcinoma     • Recurrent malignant neoplasm (HCC)    • Type 2 diabetes mellitus (HCC)      Patient no longer taking metformin d/t insurance issues.  Patient no longer taking metformin d/t insurance issues.       Plan:     Acute abdominal abscess  - CT abd/pelvis reviewed  - WBC 19.5, monitor   - Blood culture x1 pending  - Received Zosyn in the ED  - Pain medication and Zosyn ordered  - General surgery consulted   - OR consulted- drain placement     Acute UTI  - UA reviewed  - Urine culture pending   - Antibiotics ordered as above    Acute thrombocytosis  - Platelets 517, monitor     Acute microcytic anemia  - Hbg 8.9, monitor   - Anemia studies ordered  - Could be secondary to cancer     Vulvular cancer   - CT abd/pelvis reviewed  - No currently receiving chemo or radiation, [facility] hosp[facility] at home   - Managed by [facility]     Type II DM with hyperglycemia  - Start sliding scale, Accuchecks ACHS  - Check hbg A1C  - No reported home medications     I  DVT prophylaxis: SCD's    CODE STATUS:    Level Of Support Discussed With: Patient  Code Status (Patient has no pulse and is not breathing): CPR (Attempt to Resuscitate)  Medical Interventions (Patient has pulse or is breathing): Full Support    Admission Status:  I believe this patient meets Inpatient status.    I discussed the patient's findings and my recommendations with patient, family and consulting provider.    This patient has been examined wearing appropriate Personal Protective Equipment . 11/18/21      Signature:  Electronically signed by Tatiana Reynoso, MANISH, 11/18/21, 3:19 PM EST.

## 2021-11-18 NOTE — POST-PROCEDURE NOTE
IR POST OP NOTE    Procedure:CT guided LLQ abscess drain placement      Pre Op DX:Large abdominal and pelvic abscess.       Post Op DX:same      Anesthesia: Local, CS      Findings: 10 fr drain placed. 400 ml grossly purulent material aspirated. Sample sent for culture.       Complications:No immediate.       Provider Signature: Dr. Shane Andrade

## 2021-11-18 NOTE — PLAN OF CARE
Problem: Fall Injury Risk  Goal: Absence of Fall and Fall-Related Injury  Outcome: Ongoing, Progressing     Problem: Adult Inpatient Plan of Care  Goal: Plan of Care Review  Outcome: Ongoing, Progressing  Goal: Patient-Specific Goal (Individualized)  Outcome: Ongoing, Progressing  Goal: Absence of Hospital-Acquired Illness or Injury  Outcome: Ongoing, Progressing  Goal: Optimal Comfort and Wellbeing  Outcome: Ongoing, Progressing  Goal: Readiness for Transition of Care  Outcome: Ongoing, Progressing   Goal Outcome Evaluation:

## 2021-11-18 NOTE — ED PROVIDER NOTES
Subjective   66-year-old female presents with complaint of left lower quadrant pain that radiates to the left upper quadrant, fever with T-max of 100.0.  She reports she has not had a bowel movement in the last 2 days.  She reports a history of valvular cancer in which she completed [facility] and [facility]  She denies nausea vomiting diarrhea melena hematochezia nor vaginal bleeding or discharge.  She reports that she has had the following abdominal surgeries: Partial hysterectomy,  x2, and appendectomy.  She does report that she is under the care of hospice but she is unsure of who her doctor is.  She does report being a type II diabetic.    1. Location: Left lower quadrant  2. Quality: Shooting  3. Severity: Moderate  4. Worsening factors: Denies  5. Alleviating factors: Denies  6. Onset: Yesterday  7. Radiation: Left upper quadrant  8. Frequency: Constant with periods of intensity  9. Co-morbidities: Past Medical History:  No date: Diabetes mellitus (HCC)  No date: Vulva cancer (HCC)  10. Source: Patient            Review of Systems   Constitutional: Negative for appetite change, chills, diaphoresis and fever.   Gastrointestinal: Positive for abdominal pain. Negative for blood in stool, constipation, diarrhea, nausea and vomiting.   Genitourinary: Negative for decreased urine volume, difficulty urinating, flank pain, hematuria and pelvic pain.   Skin: Negative for color change, pallor and rash.   Hematological: Negative for adenopathy.   All other systems reviewed and are negative.      Past Medical History:   Diagnosis Date   • Diabetes mellitus (HCC)    • Vulva cancer (HCC)        No Known Allergies    History reviewed. No pertinent surgical history.    History reviewed. No pertinent family history.    Social History     Socioeconomic History   • Marital status:            Objective   Physical Exam  Vitals and nursing note reviewed.   Constitutional:       General: She is  awake. She is not in acute distress.     Appearance: Normal appearance. She is well-developed and normal weight. She is not ill-appearing or toxic-appearing.   HENT:      Mouth/Throat:      Mouth: Mucous membranes are moist.   Eyes:      General: No scleral icterus.  Cardiovascular:      Rate and Rhythm: Normal rate and regular rhythm.      Heart sounds: Normal heart sounds, S1 normal and S2 normal. No murmur heard.  No friction rub. No gallop.    Pulmonary:      Effort: Pulmonary effort is normal.      Breath sounds: Normal breath sounds.   Abdominal:      General: Abdomen is protuberant. Bowel sounds are normal. There is no distension.      Palpations: Abdomen is soft. There is no mass.      Tenderness: There is abdominal tenderness in the left upper quadrant and left lower quadrant. There is no right CVA tenderness, left CVA tenderness, guarding or rebound.      Hernia: No hernia is present.       Skin:     General: Skin is warm and dry.      Capillary Refill: Capillary refill takes less than 2 seconds.      Coloration: Skin is not jaundiced or pale.      Findings: No rash.   Neurological:      Mental Status: She is alert and oriented to person, place, and time.   Psychiatric:         Mood and Affect: Mood normal.         Behavior: Behavior normal. Behavior is cooperative.         Thought Content: Thought content normal.         Judgment: Judgment normal.         Procedures           ED Course  ED Course as of 11/18/21 1439   Thu Nov 18, 2021   1305 Awaiting patient to go to CT. [AL]      ED Course User Index  [AL] Cherie Acuña APRN      CT Abdomen Pelvis With Contrast    Result Date: 11/18/2021  1.Soft tissue thickening with ulceration involving peritoneum, which could represent known valvular cancer. Recommend correlation with direct inspection. 2.Large 25 cm lobulated rim-enhancing collection along left abdomen and pelvis as described above, most concerning for an abscess. This would be amenable to  percutaneous drainage. 3.Trace pelvic free fluid. 4.Hepatic steatosis.  Electronically Signed By-Marvin Medina MD On:11/18/2021 1:45 PM This report was finalized on [id number] by  Marvin Medina MD.    Medications   sodium chloride 0.9 % flush 10 mL (has no administration in time range)   Lidocaine HCl gel (XYLOCAINE) urethral/mucosal syringe (has no administration in time range)   diphenhydrAMINE (BENADRYL) injection 25 mg (25 mg Intravenous Not Given 11/18/21 1336)   piperacillin-tazobactam (ZOSYN) IVPB 3.375 g in 100 mL NS (CD) (has no administration in time range)   lactated ringers bolus 1,000 mL (1,000 mL Intravenous New Bag 11/18/21 1200)   HYDROmorphone (DILAUDID) injection 1 mg (1 mg Intravenous Given 11/18/21 1159)   iopamidol (ISOVUE-370) 76 % injection 100 mL (100 mL Intravenous Given 11/18/21 1331)     Labs Reviewed   COMPREHENSIVE METABOLIC PANEL - Abnormal; Notable for the following components:       Result Value    Glucose 191 (*)     Sodium 134 (*)     Chloride 88 (*)     CO2 35.0 (*)     Calcium 8.5 (*)     Albumin 2.00 (*)     Alkaline Phosphatase 175 (*)     All other components within normal limits    Narrative:     GFR Normal >60  Chronic Kidney Disease <60  Kidney Failure <15     LIPASE - Abnormal; Notable for the following components:    Lipase 8 (*)     All other components within normal limits   URINALYSIS W/ MICROSCOPIC IF INDICATED (NO CULTURE) - Abnormal; Notable for the following components:    Color, UA Dark Yellow (*)     Appearance, UA Turbid (*)     Blood, UA Large (3+) (*)     Protein,  mg/dL (2+) (*)     Leuk Esterase, UA Large (3+) (*)     All other components within normal limits   CBC WITH AUTO DIFFERENTIAL - Abnormal; Notable for the following components:    WBC 19.50 (*)     Hemoglobin 8.9 (*)     Hematocrit 28.4 (*)     MCV 73.9 (*)     MCH 23.2 (*)     MCHC 31.4 (*)     RDW 17.6 (*)     Platelets 517 (*)     Neutrophil % 86.5 (*)     Lymphocyte % 5.6 (*)      Eosinophil % 0.0 (*)     Neutrophils, Absolute 16.90 (*)     Monocytes, Absolute 1.40 (*)     All other components within normal limits   URINALYSIS, MICROSCOPIC ONLY - Abnormal; Notable for the following components:    RBC, UA 13-20 (*)     WBC, UA Too Numerous to Count (*)     Bacteria, UA 2+ (*)     Squamous Epithelial Cells, UA 3-6 (*)     All other components within normal limits   POCT GLUCOSE FINGERSTICK - Abnormal; Notable for the following components:    Glucose 193 (*)     All other components within normal limits   POC LACTATE - Normal   BLOOD CULTURE   BLOOD CULTURE   RAINBOW DRAW    Narrative:     The following orders were created for panel order Dow Draw.  Procedure                               Abnormality         Status                     ---------                               -----------         ------                     Green Top (Gel)[[id number]]                                  Final result               Lavender Top[[id number]]                                     Final result               Gold Top - SST[[id number]]                                   Final result               Light Blue Top[[id number]]                                   Final result                 Please view results for these tests on the individual orders.   POCT GLUCOSE FINGERSTICK   CBC AND DIFFERENTIAL    Narrative:     The following orders were created for panel order CBC & Differential.  Procedure                               Abnormality         Status                     ---------                               -----------         ------                     CBC Auto Differential[[id number]]        Abnormal            Final result               Scan Slide[[id number]]                                                                    Please view results for these tests on the individual orders.   GREEN TOP   LAVENDER TOP   GOLD TOP - SST   LIGHT BLUE TOP                                          MDM  Number of Diagnoses or  Management Options  Acute cystitis with hematuria  Fever, unspecified fever cause  History of cancer of vulva  Intra-abdominal abscess (HCC)  Left lower quadrant abdominal pain  Diagnosis management comments: Chart Review: 2021 patient was seen by her gynecologist oncologist with a 2-month follow-up.  She has progressive disease process and they have discussed that surgery is at this point not an option.  Comorbidity: Past Medical History:  No date: Diabetes mellitus (HCC)  No date: Vulva cancer (HCC)  Imaging: Was interpreted by physician and reviewed by myself: CT Abdomen Pelvis With Contrast   Final Result    1.Soft tissue thickening with ulceration involving peritoneum, which    could represent known valvular cancer. Recommend correlation with direct    inspection.    2.Large 25 cm lobulated rim-enhancing collection along left abdomen and    pelvis as described above, most concerning for an abscess. This would be    amenable to percutaneous drainage.    3.Trace pelvic free fluid.    4.Hepatic steatosis.         Electronically Signed By-Marvin Medina MD On:2021 1:45 PM    This report was finalized on [id number] by  Marvin Medina MD.    Patient undressed and placed in gown for exam.  Appropriate PPE worn during patient exam. 66-year-old female presents with complaint of left lower quadrant pain that radiates to the left upper quadrant, fever with T-max of 100.0.  She reports she has not had a bowel movement in the last 2 days.  She reports a history of valvular cancer in which she completed chemo and radiation in 2020 at [facility].  She denies nausea vomiting diarrhea melena hematochezia nor vaginal bleeding or discharge.  She reports that she has had the following abdominal surgeries: Partial hysterectomy,  x2, and appendectomy.  She does report that she is under the care of hospice but she is unsure of who her doctor is.  She does report being a type II diabetic.  Abdomen was  "noted to be distended, soft, and tender to palpation in the left upper and lower quadrants.  Bowel sounds were noted in all 4 quadrants.  IV established and labs obtained.  Lactated Ringer's 1 L bolus, Dilaudid 1 mg IV, and Zofran 4 mg IV given.  Abdominal protocol initiated.  CT of abdomen pelvis with IV contrast obtained with the above findings. Blood cell count 19,500 with a left shift of 86% neutrophils platelets 517 hemoglobin 8.9 hematocrit 28.4 CMP is essentially unremarkable.  Lactic is 1.2 blood cultures pending urine was significant for large blood large leuk esterase 13-20 red cells too numerous white cells to count 2+ bacteria.  Patient was given Zosyn 3.375 g IV.  CT was significant for intra-abdominal abscess.  Surgery was consulted. Spoke with KADI Simpson who accepted admission on behalf of Dr. Angeles.     Disposition/Treatment: Discussed results with patient, verbalized understanding.  Agreeable with plan of care.  Patient was stable upon admission.       Part of this note may be an electronic transcription/translation of spoken language to printed text using the Dragon Dictation System.            Amount and/or Complexity of Data Reviewed  Clinical lab tests: reviewed  Tests in the radiology section of CPT®: reviewed  Decide to obtain previous medical records or to obtain history from someone other than the patient: yes (9/22/2021 patient had the following visit with her gynecological oncologist:    \"Visit Diagnoses:   1. Recurrent malignant neoplastic disease   2. Chronic pain due to malignant neoplastic disease     Assessment, Management and Plan:   65 y.o. with recurrent vulvar carcinoma with progressive disease.     We discussed her situation in detail and advised surgery is not an option for her advanced disease.  She voiced concerns about if decreased blood flow occurred for loss of limb. Advised stenting could be an option in the future if the cancer impedes blood flow.     Review surgical " "principles of being able to remove cancer.   Reassurance given.    Keep scheduled appt with Dr. Hylton.     Keep scheduled TH visit in 2 months.\")  Discuss the patient with other providers: (Spoke with Dr. Carpenter who reviewed imaging.  He is going to consult with IR to have them place drain.)    Patient Progress  Patient progress: stable      Final diagnoses:   Left lower quadrant abdominal pain   Fever, unspecified fever cause   Intra-abdominal abscess (HCC)   History of cancer of vulva   Acute cystitis with hematuria       ED Disposition  ED Disposition     ED Disposition Condition Comment    Decision to Admit  Level of Care: Med/Surg [1]   Admitting Physician: SAJI ZACARIAS [[id number]]   Attending Physician: SAJI ZACARIAS [[id number]]            No follow-up provider specified.       Medication List      No changes were made to your prescriptions during this visit.          hCerie Acuña, APRN  11/18/21 0909    "

## 2021-11-19 PROBLEM — L30.8 DERMATITIS ASSOCIATED WITH MOISTURE: Status: ACTIVE | Noted: 2021-11-19

## 2021-11-19 LAB
ANION GAP SERPL CALCULATED.3IONS-SCNC: 9 MMOL/L (ref 5–15)
BACTERIA FLD CULT: ABNORMAL
BASOPHILS # BLD AUTO: 0 10*3/MM3 (ref 0–0.2)
BASOPHILS NFR BLD AUTO: 0.3 % (ref 0–1.5)
BUN SERPL-MCNC: 14 MG/DL (ref 8–23)
BUN/CREAT SERPL: 20.9 (ref 7–25)
CALCIUM SPEC-SCNC: 8.5 MG/DL (ref 8.6–10.5)
CHLORIDE SERPL-SCNC: 92 MMOL/L (ref 98–107)
CO2 SERPL-SCNC: 34 MMOL/L (ref 22–29)
CREAT SERPL-MCNC: 0.67 MG/DL (ref 0.57–1)
DEPRECATED RDW RBC AUTO: 45.5 FL (ref 37–54)
EOSINOPHIL # BLD AUTO: 0.1 10*3/MM3 (ref 0–0.4)
EOSINOPHIL NFR BLD AUTO: 0.5 % (ref 0.3–6.2)
ERYTHROCYTE [DISTWIDTH] IN BLOOD BY AUTOMATED COUNT: 17.3 % (ref 12.3–15.4)
GFR SERPL CREATININE-BSD FRML MDRD: 88 ML/MIN/1.73
GLUCOSE BLDC GLUCOMTR-MCNC: 148 MG/DL (ref 70–105)
GLUCOSE BLDC GLUCOMTR-MCNC: 159 MG/DL (ref 70–105)
GLUCOSE BLDC GLUCOMTR-MCNC: 188 MG/DL (ref 70–105)
GLUCOSE BLDC GLUCOMTR-MCNC: 205 MG/DL (ref 70–105)
GLUCOSE SERPL-MCNC: 146 MG/DL (ref 65–99)
GRAM STN SPEC: ABNORMAL
GRAM STN SPEC: ABNORMAL
HBA1C MFR BLD: 10 % (ref 3.5–5.6)
HCT VFR BLD AUTO: 25.8 % (ref 34–46.6)
HGB BLD-MCNC: 8.2 G/DL (ref 12–15.9)
IRON 24H UR-MRATE: 29 MCG/DL (ref 37–145)
IRON SATN MFR SERPL: 20 % (ref 20–50)
LDH SERPL-CCNC: 183 U/L (ref 135–214)
LYMPHOCYTES # BLD AUTO: 1.6 10*3/MM3 (ref 0.7–3.1)
LYMPHOCYTES NFR BLD AUTO: 10.2 % (ref 19.6–45.3)
MAGNESIUM SERPL-MCNC: 1.7 MG/DL (ref 1.6–2.4)
MCH RBC QN AUTO: 23.6 PG (ref 26.6–33)
MCHC RBC AUTO-ENTMCNC: 31.7 G/DL (ref 31.5–35.7)
MCV RBC AUTO: 74.4 FL (ref 79–97)
MONOCYTES # BLD AUTO: 1.3 10*3/MM3 (ref 0.1–0.9)
MONOCYTES NFR BLD AUTO: 8.7 % (ref 5–12)
NEUTROPHILS NFR BLD AUTO: 12.3 10*3/MM3 (ref 1.7–7)
NEUTROPHILS NFR BLD AUTO: 80.3 % (ref 42.7–76)
NRBC BLD AUTO-RTO: 0 /100 WBC (ref 0–0.2)
PLATELET # BLD AUTO: 469 10*3/MM3 (ref 140–450)
PMV BLD AUTO: 7.1 FL (ref 6–12)
POTASSIUM SERPL-SCNC: 3 MMOL/L (ref 3.5–5.2)
POTASSIUM SERPL-SCNC: 4.2 MMOL/L (ref 3.5–5.2)
RBC # BLD AUTO: 3.46 10*6/MM3 (ref 3.77–5.28)
RETICS # AUTO: 0.08 10*6/MM3 (ref 0.02–0.13)
RETICS/RBC NFR AUTO: 2.22 % (ref 0.7–1.9)
SODIUM SERPL-SCNC: 135 MMOL/L (ref 136–145)
TIBC SERPL-MCNC: 146 MCG/DL (ref 298–536)
TRANSFERRIN SERPL-MCNC: 98 MG/DL (ref 200–360)
WBC NRBC COR # BLD: 15.3 10*3/MM3 (ref 3.4–10.8)

## 2021-11-19 PROCEDURE — 82664 ELECTROPHORETIC TEST: CPT | Performed by: SURGERY

## 2021-11-19 PROCEDURE — 84132 ASSAY OF SERUM POTASSIUM: CPT | Performed by: INTERNAL MEDICINE

## 2021-11-19 PROCEDURE — 25010000002 PIPERACILLIN SOD-TAZOBACTAM PER 1 G: Performed by: NURSE PRACTITIONER

## 2021-11-19 PROCEDURE — 25010000002 HYDROMORPHONE PER 4 MG: Performed by: NURSE PRACTITIONER

## 2021-11-19 PROCEDURE — 36415 COLL VENOUS BLD VENIPUNCTURE: CPT | Performed by: NURSE PRACTITIONER

## 2021-11-19 PROCEDURE — 83540 ASSAY OF IRON: CPT | Performed by: NURSE PRACTITIONER

## 2021-11-19 PROCEDURE — 85045 AUTOMATED RETICULOCYTE COUNT: CPT | Performed by: NURSE PRACTITIONER

## 2021-11-19 PROCEDURE — 80048 BASIC METABOLIC PNL TOTAL CA: CPT | Performed by: NURSE PRACTITIONER

## 2021-11-19 PROCEDURE — 83615 LACTATE (LD) (LDH) ENZYME: CPT | Performed by: NURSE PRACTITIONER

## 2021-11-19 PROCEDURE — 83735 ASSAY OF MAGNESIUM: CPT | Performed by: NURSE PRACTITIONER

## 2021-11-19 PROCEDURE — 0 AMPICILLIN-SULBACTAM PER 1.5 G: Performed by: NURSE PRACTITIONER

## 2021-11-19 PROCEDURE — 99233 SBSQ HOSP IP/OBS HIGH 50: CPT | Performed by: SURGERY

## 2021-11-19 PROCEDURE — 99233 SBSQ HOSP IP/OBS HIGH 50: CPT | Performed by: INTERNAL MEDICINE

## 2021-11-19 PROCEDURE — 84466 ASSAY OF TRANSFERRIN: CPT | Performed by: NURSE PRACTITIONER

## 2021-11-19 PROCEDURE — 99222 1ST HOSP IP/OBS MODERATE 55: CPT | Performed by: INTERNAL MEDICINE

## 2021-11-19 PROCEDURE — 82962 GLUCOSE BLOOD TEST: CPT

## 2021-11-19 PROCEDURE — 97162 PT EVAL MOD COMPLEX 30 MIN: CPT

## 2021-11-19 PROCEDURE — 85025 COMPLETE CBC W/AUTO DIFF WBC: CPT | Performed by: NURSE PRACTITIONER

## 2021-11-19 PROCEDURE — 99231 SBSQ HOSP IP/OBS SF/LOW 25: CPT | Performed by: NURSE PRACTITIONER

## 2021-11-19 RX ORDER — POTASSIUM CHLORIDE 20 MEQ/1
40 TABLET, EXTENDED RELEASE ORAL AS NEEDED
Status: DISCONTINUED | OUTPATIENT
Start: 2021-11-19 | End: 2021-11-19 | Stop reason: SDUPTHER

## 2021-11-19 RX ORDER — MAGNESIUM SULFATE HEPTAHYDRATE 40 MG/ML
2 INJECTION, SOLUTION INTRAVENOUS AS NEEDED
Status: DISCONTINUED | OUTPATIENT
Start: 2021-11-19 | End: 2021-11-19 | Stop reason: SDUPTHER

## 2021-11-19 RX ORDER — FOLIC ACID 1 MG/1
1 TABLET ORAL DAILY
Status: DISCONTINUED | OUTPATIENT
Start: 2021-11-19 | End: 2021-11-21 | Stop reason: HOSPADM

## 2021-11-19 RX ORDER — ALPRAZOLAM 0.25 MG/1
0.25 TABLET ORAL 3 TIMES DAILY
Status: DISCONTINUED | OUTPATIENT
Start: 2021-11-19 | End: 2021-11-21 | Stop reason: HOSPADM

## 2021-11-19 RX ORDER — POTASSIUM CHLORIDE 1.5 G/1.77G
40 POWDER, FOR SOLUTION ORAL AS NEEDED
Status: DISCONTINUED | OUTPATIENT
Start: 2021-11-19 | End: 2021-11-19 | Stop reason: SDUPTHER

## 2021-11-19 RX ORDER — MAGNESIUM SULFATE HEPTAHYDRATE 40 MG/ML
4 INJECTION, SOLUTION INTRAVENOUS AS NEEDED
Status: DISCONTINUED | OUTPATIENT
Start: 2021-11-19 | End: 2021-11-19 | Stop reason: SDUPTHER

## 2021-11-19 RX ORDER — CALCIUM GLUCONATE 20 MG/ML
1 INJECTION, SOLUTION INTRAVENOUS AS NEEDED
Status: DISCONTINUED | OUTPATIENT
Start: 2021-11-19 | End: 2021-11-21 | Stop reason: HOSPADM

## 2021-11-19 RX ORDER — CALCIUM GLUCONATE 20 MG/ML
2 INJECTION, SOLUTION INTRAVENOUS AS NEEDED
Status: DISCONTINUED | OUTPATIENT
Start: 2021-11-19 | End: 2021-11-21 | Stop reason: HOSPADM

## 2021-11-19 RX ADMIN — HYDROMORPHONE HYDROCHLORIDE 1 MG: 2 INJECTION, SOLUTION INTRAMUSCULAR; INTRAVENOUS; SUBCUTANEOUS at 01:51

## 2021-11-19 RX ADMIN — POTASSIUM CHLORIDE 40 MEQ: 1.5 POWDER, FOR SOLUTION ORAL at 08:26

## 2021-11-19 RX ADMIN — HYDROMORPHONE HYDROCHLORIDE 1 MG: 2 INJECTION, SOLUTION INTRAMUSCULAR; INTRAVENOUS; SUBCUTANEOUS at 08:30

## 2021-11-19 RX ADMIN — SODIUM CHLORIDE 3 G: 900 INJECTION INTRAVENOUS at 17:40

## 2021-11-19 RX ADMIN — ALPRAZOLAM 0.25 MG: 0.25 TABLET ORAL at 19:02

## 2021-11-19 RX ADMIN — HYDROMORPHONE HYDROCHLORIDE 1 MG: 2 INJECTION, SOLUTION INTRAMUSCULAR; INTRAVENOUS; SUBCUTANEOUS at 14:58

## 2021-11-19 RX ADMIN — POTASSIUM CHLORIDE 40 MEQ: 1.5 POWDER, FOR SOLUTION ORAL at 12:31

## 2021-11-19 RX ADMIN — HYDROMORPHONE HYDROCHLORIDE 1 MG: 2 INJECTION, SOLUTION INTRAMUSCULAR; INTRAVENOUS; SUBCUTANEOUS at 23:01

## 2021-11-19 RX ADMIN — SODIUM CHLORIDE, PRESERVATIVE FREE 10 ML: 5 INJECTION INTRAVENOUS at 08:26

## 2021-11-19 RX ADMIN — PIPERACILLIN AND TAZOBACTAM 3.38 G: 3; .375 INJECTION, POWDER, LYOPHILIZED, FOR SOLUTION INTRAVENOUS at 04:47

## 2021-11-19 RX ADMIN — PIPERACILLIN AND TAZOBACTAM 3.38 G: 3; .375 INJECTION, POWDER, LYOPHILIZED, FOR SOLUTION INTRAVENOUS at 13:18

## 2021-11-19 RX ADMIN — SODIUM CHLORIDE 3 G: 900 INJECTION INTRAVENOUS at 23:06

## 2021-11-19 RX ADMIN — SODIUM CHLORIDE, PRESERVATIVE FREE 10 ML: 5 INJECTION INTRAVENOUS at 21:29

## 2021-11-19 RX ADMIN — FOLIC ACID 1 MG: 1 TABLET ORAL at 17:41

## 2021-11-19 NOTE — PROGRESS NOTES
General Surgery Progress Note    Name: Carmella Pelayo ADMIT: 2021   : 1955  PCP: Deana Wallace MD    MRN: [id number] LOS: 1 days   AGE/SEX: 66 y.o. female  ROOM: [address] [place] [address]    Chief Complaint   Patient presents with   • Abdominal Pain     Subjective     66 y.o. female admitted  with a very large intra-abdominal abscess of the left lower quadrant and left abdomen.  Etiology of the abscess is unknown.  She has a history significant for valvular cancer status post left inguinal lymph node dissection and vulvectomy 2 years ago, status post chemoradiation.    Percutaneous drainage was performed  and patient is feeling significantly better.  She still complaining of some left thigh pain and left lower extremity swelling    Objective   400 cc of purulent drainage from KATIE after procedure yesterday and the output is slowing down    Scheduled Medications:   aluminum sulfate-calcium acetate 1:20, 1,000 mL, Topical, BID  diphenhydrAMINE, 25 mg, Intravenous, Once  insulin lispro, 0-7 Units, Subcutaneous, TID AC  piperacillin-tazobactam, 3.375 g, Intravenous, Q8H  sodium chloride, 10 mL, Intravenous, Q12H  Zinc Oxide, , Apply externally, BID        Active Infusions:       As Needed Medications:  •  acetaminophen **OR** acetaminophen **OR** acetaminophen  •  aluminum-magnesium hydroxide-simethicone  •  Calcium Gluconate-NaCl **AND** calcium gluconate **AND** Calcium, Ionized  •  dextrose  •  dextrose  •  glucagon (human recombinant)  •  HYDROmorphone  •  insulin lispro **AND** insulin lispro  •  Lidocaine HCl gel  •  magnesium sulfate **OR** magnesium sulfate **OR** magnesium sulfate  •  melatonin  •  ondansetron **OR** ondansetron  •  potassium & sodium phosphates **OR** potassium & sodium phosphates  •  potassium chloride **OR** potassium chloride **OR** potassium chloride  •  sodium chloride  •  sodium chloride    Vital Signs  Vital Signs Patient Vitals for the past 24 hrs:   BP Temp  Temp src Pulse Resp SpO2 Weight   11/19/21 1223 124/77 97.9 °F (36.6 °C) Oral 89 16 95 % --   11/19/21 0415 104/68 98 °F (36.7 °C) Oral 91 17 93 % 63.4 kg (139 lb 11.2 oz)   11/18/21 2324 105/70 98.8 °F (37.1 °C) Oral 96 19 91 % --   11/18/21 1956 118/75 98.6 °F (37 °C) Oral 85 17 91 % --   11/18/21 1702 107/70 98 °F (36.7 °C) Oral 107 20 91 % --   11/18/21 1622 127/59 -- -- 104 19 99 % --   11/18/21 1603 114/52 -- -- 104 15 100 % --   11/18/21 1559 122/70 -- -- 97 13 100 % --   11/18/21 1521 113/59 -- -- 97 20 93 % --       Physical Exam:  Physical Exam  Awake and alert   Normal pulmonary effort   Abdomen nontender  KATIE drain in place left abdomen with purulent drainage but small amount  Left thigh is somewhat swollen compared to right        results Review:     CBC    Results from last 7 days   Lab Units 11/19/21  0401 11/18/21  1204   WBC 10*3/mm3 15.30* 19.50*   HEMOGLOBIN g/dL 8.2* 8.9*   PLATELETS 10*3/mm3 469* 517*   I reviewed the patient's new clinical results.    Assessment/Plan       Abscess of abdominal wall    Recurrent malignant neoplasm (HCC)    Malignant neoplasm of vulva (HCC)    Type 2 diabetes mellitus (HCC)    Thrombocytosis    Left lower quadrant abdominal pain    Acute UTI (urinary tract infection)    Anemia    Dermatitis associated with moisture      66 y.o. female event of a very large left lower quadrant abscess.  She does have a history of diverticulosis.  She also has a history of left inguinal lymph node dissection and vulvectomy 2 years ago by Dr. Strauss.  11/18 had percutaneous drainage of abscess and is feeling significantly better.  The output appears purulent.    Fluid culture shows heavy growth of Streptococcus agalactiae    The etiology of the sepsis is unknown.  She has had a diverticulosis and there is also tumor in the pelvis which could have become necrotic and somehow give rise to this abscess.  Will consult infectious disease for their thoughts on the etiology of this and  for further treatment  Personal protective equipment used for this patient encounter:  Patient wearing surgical mask []    Provider wearing a surgical mask [x]    Gloves []    Eye protection [x]    Face Shield []    Gown []    N 95 respirator or CAPR/PAPR []     This note was created using Dragon Voice Recognition software.    Jordana Carpenter MD  11/19/21  14:36 EST

## 2021-11-19 NOTE — CONSULTS
Hematology/Oncology Inpatient Consultation    Patient name: Carmella Pelayo  : 1955  MRN: [id number]  Referring Provider: Joe Angeles MD  Reason for Consultation: History of vulvar cancer    Chief complaint: Abdominal pain    History of present illness:      Carmella Pelayo is a 66 y.o. female who presented to [facility] on 2021 with complaints of abdominal pain.  Patient reports to the ER provider that she has had left-sided abdominal pain for the last few weeks that has progressively worsened.  The the patient currently has on [facility] hospice with a past history diagnosis of vulvar cancer and vulvectomy in 2019, with recurrence after vulvectomy.  The CT abdomen pelvis shows there is soft thickening of tissue with ulceration involving the perineum, also a 25 cm lobulated collection along the left abdomen and pelvis which is concerning for abscess.  Labs and urinalysis along with blood culture obtained.  Zosyn IV in the ER was given and patient admitted for management of condition.    21  Hematology/Oncology was consulted history of recurrent vulvar cancer with vulvectomy in 2019, then recurrence and is a patient of Dr. Jose Borja at [facility] with last televisit on 2021.   The patient is stage Ib grade 2, SCC of the vulva.  S/p left radical Jorge vulvectomy, and left inguinal LND on 2019.  Patient declined adjuvant radiation with weekly cisplatin.  New left-sided vulvar lesion recurrent disease 2020.  EUA, cystoscopy and vulvar biopsy 2020 reveals squamous cell carcinoma invasive, of vulva.  PET/CT 2020 shows recurrent vulvar squamous cell cancer at site of biopsy no regional or distant metastasis disease.  Keytruda started 2020, development of pneumonitis 3/17/2021.  Personal decision made in 2021 to forego aggressive treatment of her disease and focus on quality of life issues per Dr. Castrejon note.      Patient currently on a.m. on on the [facility].    She  has a past medical history of Diabetes mellitus (HCC) and Vulva cancer (HCC).    PCP: Deana Wallace MD      I have reviewed and confirmed the accuracy of the patient's history: Chief complaint, HPI, ROS and Subjective as entered by the MA/LPN/RN. Shital Lainey Pillai MD 11/19/21     History:    Past Medical History:   Diagnosis Date   • Diabetes mellitus (HCC)    • Vulva cancer (HCC)    ,   Past Surgical History:   Procedure Laterality Date   • VULVECTOMY     , History reviewed. No pertinent family history.,   Social History     Tobacco Use   • Smoking status: Never Smoker   • Smokeless tobacco: Never Used   Substance Use Topics   • Alcohol use: Not Currently   • Drug use: Not Currently   ,   Medications Prior to Admission   Medication Sig Dispense Refill Last Dose   • HYDROmorphone (DILAUDID) 1 MG/ML liquid liquid Take 1 mg by mouth Every 4 (Four) Hours As Needed for Moderate Pain .   11/18/2021 at Unknown time   • ibuprofen (ADVIL,MOTRIN) 800 MG tablet Take 800 mg by mouth Every 8 (Eight) Hours As Needed for Mild Pain .   11/17/2021 at Unknown time   , Scheduled Meds:  aluminum sulfate-calcium acetate 1:20, 1,000 mL, Topical, BID  ampicillin-sulbactam, 3 g, Intravenous, Q6H  diphenhydrAMINE, 25 mg, Intravenous, Once  folic acid, 1 mg, Oral, Daily  insulin lispro, 0-7 Units, Subcutaneous, TID AC  sodium chloride, 10 mL, Intravenous, Q12H  Zinc Oxide, , Apply externally, BID    , Continuous Infusions:   , PRN Meds:  •  acetaminophen **OR** acetaminophen **OR** acetaminophen  •  aluminum-magnesium hydroxide-simethicone  •  Calcium Gluconate-NaCl **AND** calcium gluconate **AND** Calcium, Ionized  •  dextrose  •  dextrose  •  glucagon (human recombinant)  •  HYDROmorphone  •  insulin lispro **AND** insulin lispro  •  Lidocaine HCl gel  •  magnesium sulfate **OR** magnesium sulfate **OR** magnesium sulfate  •  melatonin  •  ondansetron **OR**  "ondansetron  •  potassium & sodium phosphates **OR** potassium & sodium phosphates  •  potassium chloride **OR** potassium chloride **OR** potassium chloride  •  sodium chloride  •  sodium chloride   Allergies:  Meperidine    Subjective     ROS:  Review of Systems   Constitutional: Negative for chills, fatigue and fever.   HENT: Negative for nosebleeds and sore throat.    Eyes: Negative for discharge, itching and visual disturbance.   Respiratory: Negative for cough and shortness of breath.    Cardiovascular: Negative for chest pain and palpitations.   Gastrointestinal: Negative for constipation, diarrhea, nausea and vomiting.   Endocrine: Negative for cold intolerance and heat intolerance.   Genitourinary: Negative for dysuria and hematuria.   Musculoskeletal: Negative for neck stiffness.   Skin: Negative for wound.   Neurological: Negative for dizziness and weakness.   Hematological: Does not bruise/bleed easily.   Psychiatric/Behavioral: Negative for agitation and confusion.        Objective   Vital Signs:   /77 (BP Location: Left arm, Patient Position: Lying)   Pulse 89   Temp 97.9 °F (36.6 °C) (Oral)   Resp 16   Ht 157.5 cm (62\")   Wt 63.4 kg (139 lb 11.2 oz)   SpO2 95%   Breastfeeding No   BMI 25.55 kg/m²     Physical Exam: (performed by MD)    Physical Exam  Constitutional:       Appearance: She is not ill-appearing.   HENT:      Head: Normocephalic and atraumatic.      Nose: Nose normal.      Mouth/Throat:      Mouth: Mucous membranes are moist.      Pharynx: Oropharynx is clear.   Eyes:      Extraocular Movements: Extraocular movements intact.      Conjunctiva/sclera: Conjunctivae normal.   Cardiovascular:      Rate and Rhythm: Normal rate and regular rhythm.   Pulmonary:      Effort: Pulmonary effort is normal.      Breath sounds: Normal breath sounds. No wheezing or rhonchi.   Abdominal:      General: Bowel sounds are normal.      Palpations: Abdomen is soft.      Tenderness: There is no " abdominal tenderness. There is no guarding.   Musculoskeletal:         General: Normal range of motion.      Cervical back: Normal range of motion.      Right lower leg: No edema.   Skin:     General: Skin is warm and dry.      Capillary Refill: Capillary refill takes less than 2 seconds.      Findings: No erythema or rash.      Comments: No obvious bleeding  Dressing right abdomen   Neurological:      Mental Status: She is alert and oriented to person, place, and time.   Psychiatric:         Mood and Affect: Mood normal.         Thought Content: Thought content normal.     I have reexamined the patient and the results are consistent with the previously documented exam. Shital Pillai MD     Results Review:    Lab Results (last 48 hours)     Procedure Component Value Units Date/Time    Blood Culture - Blood, Arm, Right [[id number]]  (Normal) Collected: 11/18/21 1358    Specimen: Blood from Arm, Right Updated: 11/19/21 1402     Blood Culture No growth at 24 hours    Body Fluid Culture - Body Fluid, Peritoneum [[id number]]  (Abnormal) Collected: 11/18/21 1611    Specimen: Body Fluid from Peritoneum Updated: 11/19/21 1154     Body Fluid Culture Heavy growth (4+) Streptococcus agalactiae (Group B)     Comment: This organism is considered to be universally susceptible to penicillin.  No further antibiotic testing will be performed.        Gram Stain Many (4+) WBCs seen      Moderate (3+) Gram positive cocci in chains    POC Glucose Once [[id number]]  (Abnormal) Collected: 11/19/21 1145    Specimen: Blood Updated: 11/19/21 1154     Glucose 188 mg/dL      Comment: Serial Number: [id number]:  [id number]       Hemoglobin A1c [[id number]]  (Abnormal) Collected: 11/18/21 1204    Specimen: Blood Updated: 11/19/21 1054     Hemoglobin A1C 10.0 %     Narrative:      Hemoglobin A1C Reference Range:    <5.7 %        Normal  5.7-6.4 %     Increased risk for diabetes  > 6.4 %        Diabetes       These guidelines have been  recommended by the American Diabetic Association for Hgb A1c.      The following 2010 guidelines have been recommended by the American Diabetes Association for Hemoglobin A1c.    HBA1c 5.7-6.4% Increased risk for future diabetes (pre-diabetes)  HBA1c     >6.4% Diabetes      Urine Culture - Urine, Urine, Clean Catch [[id number]] Collected: 11/18/21 1305    Specimen: Urine, Clean Catch Updated: 11/19/21 0754    POC Glucose Once [[id number]]  (Abnormal) Collected: 11/19/21 0719    Specimen: Blood Updated: 11/19/21 0720     Glucose 159 mg/dL      Comment: Serial Number: [id number]:  [id number]       Basic Metabolic Panel [[id number]]  (Abnormal) Collected: 11/19/21 0401    Specimen: Blood Updated: 11/19/21 0505     Glucose 146 mg/dL      BUN 14 mg/dL      Creatinine 0.67 mg/dL      Sodium 135 mmol/L      Potassium 3.0 mmol/L      Chloride 92 mmol/L      CO2 34.0 mmol/L      Calcium 8.5 mg/dL      eGFR Non African Amer 88 mL/min/1.73      BUN/Creatinine Ratio 20.9     Anion Gap 9.0 mmol/L     Narrative:      GFR Normal >60  Chronic Kidney Disease <60  Kidney Failure <15      Magnesium [[id number]]  (Normal) Collected: 11/19/21 0401    Specimen: Blood Updated: 11/19/21 0505     Magnesium 1.7 mg/dL     CBC Auto Differential [[id number]]  (Abnormal) Collected: 11/19/21 0401    Specimen: Blood Updated: 11/19/21 0454     WBC 15.30 10*3/mm3      RBC 3.46 10*6/mm3      Hemoglobin 8.2 g/dL      Hematocrit 25.8 %      MCV 74.4 fL      MCH 23.6 pg      MCHC 31.7 g/dL      RDW 17.3 %      RDW-SD 45.5 fl      MPV 7.1 fL      Platelets 469 10*3/mm3      Neutrophil % 80.3 %      Lymphocyte % 10.2 %      Monocyte % 8.7 %      Eosinophil % 0.5 %      Basophil % 0.3 %      Neutrophils, Absolute 12.30 10*3/mm3      Lymphocytes, Absolute 1.60 10*3/mm3      Monocytes, Absolute 1.30 10*3/mm3      Eosinophils, Absolute 0.10 10*3/mm3      Basophils, Absolute 0.00 10*3/mm3      nRBC 0.0 /100 WBC     Folate [[id number]]  (Abnormal) Collected:  11/18/21 1204    Specimen: Blood Updated: 11/18/21 2024     Folate 4.09 ng/mL     Narrative:      Results may be falsely increased if patient taking Biotin.      Vitamin B12 [[id number]]  (Normal) Collected: 11/18/21 1204    Specimen: Blood Updated: 11/18/21 2024     Vitamin B-12 319 pg/mL     Narrative:      Results may be falsely increased if patient taking Biotin.      POC Glucose Once [[id number]]  (Abnormal) Collected: 11/18/21 1954    Specimen: Blood Updated: 11/1955     Glucose 205 mg/dL      Comment: Serial Number: [id number]:  [id number]       Magnesium [[id number]]  (Normal) Collected: 11/18/21 1204    Specimen: Blood Updated: 11/18/21 1742     Magnesium 1.6 mg/dL     POC Glucose Once [[id number]]  (Abnormal) Collected: 11/18/21 1727    Specimen: Blood Updated: 11/18/21 1728     Glucose 180 mg/dL      Comment: Serial Number: [id number]:  [id number]       COVID PRE-OP / PRE-PROCEDURE SCREENING ORDER (NO ISOLATION) - Swab, Nasopharynx [[id number]]  (Normal) Collected: 11/18/21 1518    Specimen: Swab from Nasopharynx Updated: 11/18/21 1549    Narrative:      The following orders were created for panel order COVID PRE-OP / PRE-PROCEDURE SCREENING ORDER (NO ISOLATION) - Swab, Nasopharynx.  Procedure                               Abnormality         Status                     ---------                               -----------         ------                     COVID-19,CEPHEID/ISELA/BD...[[id number]]  Normal              Final result                 Please view results for these tests on the individual orders.    COVID-19,CEPHEID/ISELA/BDMAX,COR/TUNDE/PAD/KIMO IN-HOUSE(OR EMERGENT/ADD-ON),NP SWAB IN TRANSPORT MEDIA 3-4 HR TAT, RT-PCR - Swab, Nasopharynx [[id number]]  (Normal) Collected: 11/18/21 1518    Specimen: Swab from Nasopharynx Updated: 11/18/21 1549     COVID19 Not Detected    Narrative:      Fact sheet for providers: https://www.fda.gov/media/866859/download     Fact sheet for patients:  https://www.fda.gov/media/035747/download  Fact sheet for providers: https://www.fda.gov/media/143380/download    Fact sheet for patients: https://www.fda.gov/media/460463/download    Test performed by PCR.    Ferritin [[id number]]  (Abnormal) Collected: 11/18/21 1204    Specimen: Blood Updated: 11/18/21 1538     Ferritin 555.60 ng/mL     Narrative:      Results may be falsely decreased if patient taking Biotin.      Iron [[id number]]  (Abnormal) Collected: 11/18/21 1204    Specimen: Blood Updated: 11/18/21 1531     Iron 16 mcg/dL     Blood Culture - Blood, Arm, Right [[id number]] Collected: 11/18/21 1449    Specimen: Blood from Arm, Right Updated: 11/18/21 1453    Urinalysis, Microscopic Only - Urine, Clean Catch [[id number]]  (Abnormal) Collected: 11/18/21 1305    Specimen: Urine, Clean Catch Updated: 11/18/21 1316     RBC, UA 13-20 /HPF      WBC, UA Too Numerous to Count /HPF      Bacteria, UA 2+ /HPF      Squamous Epithelial Cells, UA 3-6 /HPF      Hyaline Casts, UA None Seen /LPF      Methodology Manual Light Microscopy    Harrison Draw [[id number]] Collected: 11/18/21 1204    Specimen: Blood Updated: 11/18/21 1315    Narrative:      The following orders were created for panel order Harrison Draw.  Procedure                               Abnormality         Status                     ---------                               -----------         ------                     Green Top (Gel)[[id number]]                                  Final result               Lavender Top[[id number]]                                     Final result               Gold Top - SST[[id number]]                                   Final result               Light Blue Top[[id number]]                                   Final result                 Please view results for these tests on the individual orders.    Gold Top - SST [[id number]] Collected: 11/18/21 1204    Specimen: Blood Updated: 11/18/21 1315     Extra Tube Hold for add-ons.     Comment: Auto  resulted.       Green Top (Gel) [[id number]] Collected: 11/18/21 1204    Specimen: Blood Updated: 11/18/21 1315     Extra Tube Hold for add-ons.     Comment: Auto resulted.       Light Blue Top [[id number]] Collected: 11/18/21 1204    Specimen: Blood Updated: 11/18/21 1315     Extra Tube hold for add-on     Comment: Auto resulted       Urinalysis With Microscopic If Indicated (No Culture) - Urine, Clean Catch [[id number]]  (Abnormal) Collected: 11/18/21 1305    Specimen: Urine, Clean Catch Updated: 11/18/21 1313     Color, UA Dark Yellow     Appearance, UA Turbid     Comment: Result checked         pH, UA 5.5     Specific Gravity, UA 1.019     Glucose, UA Negative     Ketones, UA Negative     Bilirubin, UA Negative     Blood, UA Large (3+)     Protein,  mg/dL (2+)     Leuk Esterase, UA Large (3+)     Nitrite, UA Negative     Urobilinogen, UA 1.0 E.U./dL    POC Glucose Once [[id number]]  (Abnormal) Collected: 11/18/21 1303    Specimen: Blood Updated: 11/18/21 1304     Glucose 193 mg/dL      Comment: Serial Number: [id number]:  [id number]       Comprehensive Metabolic Panel [[id number]]  (Abnormal) Collected: 11/18/21 1204    Specimen: Blood Updated: 11/18/21 1253     Glucose 191 mg/dL      BUN 15 mg/dL      Creatinine 0.72 mg/dL      Sodium 134 mmol/L      Potassium 3.5 mmol/L      Chloride 88 mmol/L      CO2 35.0 mmol/L      Calcium 8.5 mg/dL      Total Protein 7.3 g/dL      Albumin 2.00 g/dL      ALT (SGPT) <5 U/L      AST (SGOT) 9 U/L      Alkaline Phosphatase 175 U/L      Total Bilirubin 0.5 mg/dL      eGFR Non African Amer 81 mL/min/1.73      Globulin 5.3 gm/dL      A/G Ratio 0.4 g/dL      BUN/Creatinine Ratio 20.8     Anion Gap 11.0 mmol/L     Narrative:      GFR Normal >60  Chronic Kidney Disease <60  Kidney Failure <15      Lipase [[id number]]  (Abnormal) Collected: 11/18/21 1204    Specimen: Blood Updated: 11/18/21 1241     Lipase 8 U/L     CBC & Differential [[id number]]  (Abnormal) Collected: 11/18/21  1204    Specimen: Blood Updated: 11/18/21 1219    Narrative:      The following orders were created for panel order CBC & Differential.  Procedure                               Abnormality         Status                     ---------                               -----------         ------                     CBC Auto Differential[[id number]]        Abnormal            Final result               Scan Slide[[id number]]                                                                    Please view results for these tests on the individual orders.    CBC Auto Differential [[id number]]  (Abnormal) Collected: 11/18/21 1204    Specimen: Blood Updated: 11/18/21 1219     WBC 19.50 10*3/mm3      RBC 3.85 10*6/mm3      Hemoglobin 8.9 g/dL      Hematocrit 28.4 %      MCV 73.9 fL      MCH 23.2 pg      MCHC 31.4 g/dL      RDW 17.6 %      RDW-SD 45.9 fl      MPV 6.8 fL      Platelets 517 10*3/mm3      Neutrophil % 86.5 %      Lymphocyte % 5.6 %      Monocyte % 7.2 %      Eosinophil % 0.0 %      Basophil % 0.7 %      Neutrophils, Absolute 16.90 10*3/mm3      Lymphocytes, Absolute 1.10 10*3/mm3      Monocytes, Absolute 1.40 10*3/mm3      Eosinophils, Absolute 0.00 10*3/mm3      Basophils, Absolute 0.10 10*3/mm3      nRBC 0.1 /100 WBC     Lavender Top [[id number]] Collected: 11/18/21 1204    Specimen: Blood Updated: 11/18/21 1219     Extra Tube done    POC Lactate [[id number]]  (Normal) Collected: 11/18/21 1208    Specimen: Blood Updated: 11/18/21 1210     Lactate 1.2 mmol/L      Comment: Serial Number: [id number]:  [id number]              Pending Results:     Imaging Reviewed:   CT Abdomen Pelvis With Contrast    Result Date: 11/18/2021  1.Soft tissue thickening with ulceration involving peritoneum, which could represent known valvular cancer. Recommend correlation with direct inspection. 2.Large 25 cm lobulated rim-enhancing collection along left abdomen and pelvis as described above, most concerning for an abscess. This would be  amenable to percutaneous drainage. 3.Trace pelvic free fluid. 4.Hepatic steatosis.  Electronically Signed By-Marvin Medina MD On:11/18/2021 1:45 PM This report was finalized on [id number] by  Marvin Medina MD.    CT Guided Abscess Drain Peritoneal    Result Date: 11/18/2021  IMPRESSION : CT-guided abscess drain catheter for drainage of large left abdominal pelvic abscess collection, as described above.  Electronically Signed By-Shane Andrade MD On:11/18/2021 4:58 PM This report was finalized on [id number] by  Shane Andrade MD.           Assessment/Plan   ASSESSMENT    Recurrent vulvar cancer: Stage Ib grade 2 SCC of the vulva.  S/p left radical Jorge vulvectomy, and left inguinal LND on 4/18/2019.  Patient declined adjuvant radiation with weekly cisplatin.  New left-sided vulvar lesion recurrent disease 2/23/2020.  EUA, cystoscopy and vulvar biopsy 11/12/2020 reveals squamous cell carcinoma invasive, of vulva.  PET/CT 12/7/2020 shows recurrent vulvar squamous cell cancer at site of biopsy no regional or distant metastasis disease.  Keytruda started December 2020, development of pneumonitis 3/17/2021.  Personal decision made in August 2021 to forego aggressive treatment of her disease and focus on quality of life issues per Dr. Borja 9/22/21 note.    Large intra-abdominal abscess of left lower quadrant: CT 11/18/2021 reveals 25 cm lobulated rim-enhancing collection along the left abdomen and pelvis concerning for abscess.    Percutaneous drainage was performed 11/18/2021 per Dr. Carpenter.  Fluid growth reveals Streptococcus Agalactieae.    Infectious disease consult  Blood cultures are pending    Leukocytosis: Related to abdominal abscess    Thrombocytosis: Platelet 517.  Monitor CBC daily    Anemia: Related to disease process, immunotherapy, chronic illness.  Folate level 4.09.  Folic acid 1 mg daily  Iron level 16.   Check iron profile, vitamin B12, hemolysis labs    Hypokalemia: Potassium 3.0.  Replete  per primary    Other chronic conditions: DM type II, dermatitis associated with moisture, urinary tract infection,    PLANS:    1. Await ID consult  2. Follow with general surgery  3. Await blood and urine cultures  4. Iron profile, B12, hemolysis labs  5. Supportive care  6. Follow with Dr. Borja at [facility] upon discharge       Electronically signed by MANISH Howell, 11/19/21, 3:47 PM EST.    Thank you for this consult. We will be happy to follow along with you.     Patient seen and examined.  Face-to-face evaluation completed.  Note reviewed and edited.  Discussed with nurse practitioner.  I agree with assessment and plan as documented above.  Patient has a history of vulvar cancer has had surgery followed by chemoradiation.  She has also received Keytruda last treatment was in March 2021.  Patient developed pneumonitis from Keytruda which was subsequently discontinued.  She has not had any more treatment since then.  She comes in this time with a large abdominal/pelvic abscess status post percutaneous drain.  She is also on IV antibiotics.  Folate deficiency on replacement therapy      Electronically signed by Shital Pillai MD, 11/19/21, 5:53 PM EST.

## 2021-11-19 NOTE — PROGRESS NOTES
[facility] Medicine Services Daily Progress Note    Patient Name: Carmella Pelayo  : 1955  MRN: [id number]  Primary Care Physician:  Deana Wallace MD  Date of admission: 2021      Subjective      Chief Complaint: Abdominal pain, chills and fever.      Patient Reports no overnight events.    Review of Systems   Constitutional: Negative.   HENT: Negative.    Eyes: Negative.    Cardiovascular: Negative.    Respiratory: Negative.    Endocrine: Negative.    Hematologic/Lymphatic: Negative.    Skin: Negative.    Musculoskeletal: Negative.    Gastrointestinal: Negative.    Genitourinary: Negative.    Neurological: Negative.    Psychiatric/Behavioral: Negative.    Allergic/Immunologic: Negative.    .        Objective      Vitals:   Temp:  [97.9 °F (36.6 °C)-98.8 °F (37.1 °C)] 97.9 °F (36.6 °C)  Heart Rate:  [85-96] 89  Resp:  [16-19] 16  BP: (104-124)/(68-77) 124/77    Physical Exam  Vitals and nursing note reviewed.   Constitutional:       General: She is not in acute distress.  HENT:      Head: Normocephalic and atraumatic.      Nose: Nose normal. No congestion or rhinorrhea.      Mouth/Throat:      Mouth: Mucous membranes are moist.      Pharynx: Oropharynx is clear. No oropharyngeal exudate or posterior oropharyngeal erythema.   Eyes:      Pupils: Pupils are equal, round, and reactive to light.   Cardiovascular:      Pulses: Normal pulses.      Heart sounds: Normal heart sounds. No murmur heard.  No friction rub. No gallop.       Comments: S1 and S2 present.  No tachycardia.  Pulmonary:      Effort: No respiratory distress.      Breath sounds: No wheezing, rhonchi or rales.   Chest:      Chest wall: No tenderness.   Abdominal:      General: Abdomen is flat. Bowel sounds are normal. There is no distension.      Palpations: Abdomen is soft.      Tenderness: There is no right CVA tenderness.   Musculoskeletal:         General: No swelling, tenderness, deformity or signs of  injury.      Cervical back: Neck supple. No tenderness.      Right lower leg: No edema.      Left lower leg: No edema.   Skin:     Capillary Refill: Capillary refill takes less than 2 seconds.      Coloration: Skin is not jaundiced.      Findings: No bruising, lesion or rash.   Neurological:      Mental Status: She is alert.      Comments: No facial asymmetry noted.  Gait and station not tested.   Psychiatric:      Comments: No agitation.               Result Review    Result Review:  I have personally reviewed the results from the time of this admission to 11/19/2021 17:40 EST and agree with these findings:  [x]  Laboratory  [x]  Microbiology  [x]  Radiology  []  EKG/Telemetry   []  Cardiology/Vascular   []  Pathology  []  Old records  []  Other:  Most notable findings include:           Assessment/Plan      Brief Patient Summary:  Patient is a 66 female with past medical history of diabetes mellitus type 2 and a vulvar cancer who presented to the emergency room because of fever and chills and abdominal pain.  Patient was seen in the emergency room and was diagnosed with abdominal wall abscess.  The abscess was drained and patient was started on antibiotics.  General surgery consult was completed.  Hematology and infectious disease consults were completed.        aluminum sulfate-calcium acetate 1:20, 1,000 mL, Topical, BID  ampicillin-sulbactam, 3 g, Intravenous, Q6H  diphenhydrAMINE, 25 mg, Intravenous, Once  folic acid, 1 mg, Oral, Daily  insulin lispro, 0-7 Units, Subcutaneous, TID AC  sodium chloride, 10 mL, Intravenous, Q12H  Zinc Oxide, , Apply externally, BID             Active Hospital Problems:  Active Hospital Problems    Diagnosis    • **Abscess of abdominal wall  Treat with antibiotics.      • Acute UTI (urinary tract infection)  Follow cultures.      • Dermatitis associated with moisture    • Malignant neoplasm of vulva (HCC)      Squamous cell carcinoma     • Thrombocytosis  Continue to monitor.       • Left lower quadrant abdominal pain  Treat with pain control.      • Anemia  Monitor H&H.      • Recurrent malignant neoplasm (HCC)    • Type 2 diabetes mellitus (HCC)      Patient no longer taking metformin d/t insurance issues.  Patient no longer taking metformin d/t insurance issues.  Treat with insulin therapy.         Continue appropriate patient's home medications for other chronic medical conditions.  Continue the present level of care.  Patient and family agreed with the plan of care.      DVT prophylaxis:  Mechanical DVT prophylaxis orders are present.    CODE STATUS:    Level Of Support Discussed With: Patient  Code Status (Patient has no pulse and is not breathing): CPR (Attempt to Resuscitate)  Medical Interventions (Patient has pulse or is breathing): Full Support      Disposition: Pending patient's clinical improvement.    This patient has been examined wearing appropriate Personal Protective Equipment and discussed with hospital infection control department, [facility] department, infectious disease specialist and pulmonologist. 11/19/21      Electronically signed by Joe Angeles MD, FACP, 11/19/21, 17:40 EST.      [facility] Hospitalist Team

## 2021-11-19 NOTE — CASE MANAGEMENT/SOCIAL WORK
Discharge Planning Assessment   Alok     Patient Name: Carmella Pelayo  MRN: [id number]  Today's Date: 11/19/2021    Admit Date: 11/18/2021     Discharge Needs Assessment     Row Name 11/19/21 1424       Living Environment    Lives With child(melvina), dependent    Name(s) of Who Lives With Patient 8 year-old son    Current Living Arrangements home/apartment/condo    Primary Care Provided by self    Provides Primary Care For child(melvina)    Family Caregiver if Needed friend(s)    Quality of Family Relationships helpful; involved; supportive    Able to Return to Prior Arrangements yes       Resource/Environmental Concerns    Resource/Environmental Concerns none    Transportation Concerns car, none       Transition Planning    Patient/Family Anticipates Transition to inpatient rehabilitation facility; long-term care facility    Patient/Family Anticipated Services at Transition rehabilitation services; skilled nursing       Discharge Needs Assessment    Readmission Within the Last 30 Days no previous admission in last 30 days    Current Outpatient/Agency/Support Group home hospice  [facility]    Equipment Currently Used at Home walker, rolling    Concerns to be Addressed discharge planning; care coordination/care conferences    Anticipated Changes Related to Illness inability to care for self; inability to care for someone else    Equipment Needed After Discharge none    Outpatient/Agency/Support Group Needs skilled nursing facility; inpatient rehabilitation facility    Discharge Facility/Level of Care Needs nursing facility, skilled; rehabilitation facility    Provided Post Acute Provider List? N/A    Provided Post Acute Provider Quality & Resource List? N/A               Discharge Plan     Row Name 11/19/21 1427       Plan    Plan DC plan: from home, referral to [facility] pending. No precert required. PASRR per facility.    Provided Post Acute Provider List? N/A    Provided Post Acute Provider Quality &  Resource List? N/A    Patient/Family in Agreement with Plan yes    Plan Comments Met with patient at bedside. Confirmed PCP and pharmacy. States she is normally ambulatory/independent, has an 8 year old son at home. Does not drive but friend provides transportation. [facility]. Pt states she's been working to get into [facility]. Placed in [facility] basket and liaison notified. DC barriers: KATIE drain and cultures pending, IV abx, Hem/Onc consult.              Continued Care and Services - Admitted Since 11/18/2021     Destination     Service Provider Request Status Selected Services Address Phone Fax Patient Preferred    [facility]  Pending - Request Sent N/A [address] [place] [address] [phone] --    [facility]  Pending - No Request Sent N/A [address] [place] [address] [phone] -- --               Demographic Summary     Row Name 11/19/21 1423       General Information    Admission Type inpatient    Arrived From emergency department    Referral Source admission list    Reason for Consult discharge planning; care coordination/care conference    Preferred Language English     Used During This Interaction no       Contact Information    Permission Granted to Share Info With                Functional Status     Row Name 11/19/21 1423       Functional Status    Usual Activity Tolerance good    Current Activity Tolerance fair       Functional Status, IADL    Medications independent    Meal Preparation independent    Housekeeping independent    Laundry independent    Shopping independent              Met with patient in room wearing PPE: mask, face shield/goggles.      Maintained distance greater than six feet and spent less than 15 minutes in the room.      Megan Naegele, RN      Office Phone: [phone]  Office Cell: [phone]

## 2021-11-19 NOTE — NURSING NOTE
Patients potassium is 3.0 this AM. Attempted to give patient potassium protocol 40 meq of potassium. Patient refuses stating she can not swallow the pills and request it to be given IV. Explained to patient about antibiotic running and medications not being compatible. Offered patient liquid potassium and patient states she can not tolerate the liquid and request IV potassium after antibiotic is complete.

## 2021-11-19 NOTE — PLAN OF CARE
Problem: Fall Injury Risk  Goal: Absence of Fall and Fall-Related Injury  Outcome: Ongoing, Progressing  Intervention: Identify and Manage Contributors to Fall Injury Risk  Recent Flowsheet Documentation  Taken 11/19/2021 1500 by Kiya Ken RN  Medication Review/Management: medications reviewed  Taken 11/19/2021 1300 by Kiya Ken RN  Medication Review/Management: medications reviewed  Taken 11/19/2021 1100 by Kiya Ken RN  Medication Review/Management: medications reviewed  Taken 11/19/2021 0900 by Kiya Ken RN  Medication Review/Management: medications reviewed  Taken 11/19/2021 0730 by Kiya Ken RN  Medication Review/Management: medications reviewed  Intervention: Promote Injury-Free Environment  Recent Flowsheet Documentation  Taken 11/19/2021 1500 by Kiya Ken RN  Safety Promotion/Fall Prevention:   safety round/check completed   nonskid shoes/slippers when out of bed  Taken 11/19/2021 1300 by Kiya Ken RN  Safety Promotion/Fall Prevention:   safety round/check completed   nonskid shoes/slippers when out of bed  Taken 11/19/2021 1100 by Kiya Ken RN  Safety Promotion/Fall Prevention:   safety round/check completed   nonskid shoes/slippers when out of bed  Taken 11/19/2021 0900 by Kiya Ken RN  Safety Promotion/Fall Prevention:   safety round/check completed   nonskid shoes/slippers when out of bed  Taken 11/19/2021 0730 by Kiya Ken RN  Safety Promotion/Fall Prevention:   safety round/check completed   nonskid shoes/slippers when out of bed     Problem: Adult Inpatient Plan of Care  Goal: Plan of Care Review  Outcome: Ongoing, Progressing  Goal: Patient-Specific Goal (Individualized)  Outcome: Ongoing, Progressing  Goal: Absence of Hospital-Acquired Illness or Injury  Outcome: Ongoing, Progressing  Intervention: Identify and Manage Fall Risk  Recent Flowsheet Documentation  Taken 11/19/2021 1500 by Kiya Ken RN  Safety  Promotion/Fall Prevention:   safety round/check completed   nonskid shoes/slippers when out of bed  Taken 11/19/2021 1300 by Kiya Ken RN  Safety Promotion/Fall Prevention:   safety round/check completed   nonskid shoes/slippers when out of bed  Taken 11/19/2021 1100 by Kiya Ken RN  Safety Promotion/Fall Prevention:   safety round/check completed   nonskid shoes/slippers when out of bed  Taken 11/19/2021 0900 by Kiya Ken RN  Safety Promotion/Fall Prevention:   safety round/check completed   nonskid shoes/slippers when out of bed  Taken 11/19/2021 0730 by Kiya Ken RN  Safety Promotion/Fall Prevention:   safety round/check completed   nonskid shoes/slippers when out of bed  Intervention: Prevent Infection  Recent Flowsheet Documentation  Taken 11/19/2021 1500 by Kiya Ken RN  Infection Prevention: personal protective equipment utilized  Taken 11/19/2021 1300 by Kiya Ken RN  Infection Prevention: personal protective equipment utilized  Taken 11/19/2021 1100 by Kiya Ken RN  Infection Prevention: personal protective equipment utilized  Taken 11/19/2021 0900 by Kiya Ken RN  Infection Prevention: personal protective equipment utilized  Taken 11/19/2021 0730 by Kiya Ken RN  Infection Prevention: personal protective equipment utilized  Goal: Optimal Comfort and Wellbeing  Outcome: Ongoing, Progressing  Goal: Readiness for Transition of Care  Outcome: Ongoing, Progressing   Goal Outcome Evaluation:

## 2021-11-19 NOTE — PROGRESS NOTES
Wound Initial Evaluation   SANDRA     Patient Name: Carmella Pelayo  : 1955  MRN: [id number]  Today's Date: 2021 Room Number: 357/1      Admit Date: 2021  Attending: Joe Angeles MD    Consult Requested By: Dr Angeles    Reason For Consult: perirecal  excoriation    Chief Complaint: 66-year-old female presents to the hospital with complaints of left lower quadrant pain with fever.  Consult received to assess patient's perirectal skin.  Patient does have a history of valvular cancer and states that the area has been sore and open for over 3 years.  She does report some drainage and seepage.  She reports that the area is very tender to touch and painful and she has been treating it at home with Aquaphor ointment    Visit Dx:    ICD-10-CM ICD-9-CM   1. Left lower quadrant abdominal pain  R10.32 789.04   2. Fever, unspecified fever cause  R50.9 780.60   3. Intra-abdominal abscess (HCC)  K65.1 567.22   4. History of cancer of vulva  Z85.44 V10.44   5. Acute cystitis with hematuria  N30.01 595.0     Patient Active Problem List   Diagnosis   • Recurrent malignant neoplasm (HCC)   • Malignant neoplasm of vulva (HCC)   • Type 2 diabetes mellitus (HCC)   • Thrombocytosis   • Left lower quadrant abdominal pain   • Abscess of abdominal wall   • Acute UTI (urinary tract infection)   • Anemia   • Dermatitis associated with moisture       History:   Past Medical History:   Diagnosis Date   • Diabetes mellitus (HCC)    • Vulva cancer (HCC)      Past Surgical History:   Procedure Laterality Date   • VULVECTOMY       Social History     Socioeconomic History   • Marital status:    Tobacco Use   • Smoking status: Never Smoker   • Smokeless tobacco: Never Used   Substance and Sexual Activity   • Alcohol use: Not Currently   • Drug use: Not Currently   • Sexual activity: Not Currently       Allergies:  Allergies   Allergen Reactions   • Meperidine Palpitations, Anxiety and Nausea And Vomiting     Other  "reaction(s): \"feel like I am going to pass out\", Feel like I am going to have panic attack, Irregular heart beat, Nausea         Medications:    Current Facility-Administered Medications:   •  acetaminophen (TYLENOL) tablet 650 mg, 650 mg, Oral, Q4H PRN **OR** acetaminophen (TYLENOL) 160 MG/5ML solution 650 mg, 650 mg, Oral, Q4H PRN **OR** acetaminophen (TYLENOL) suppository 650 mg, 650 mg, Rectal, Q4H PRN, Tatiana Reyonso APRN  •  aluminum sulfate-calcium acetate 1:20 (DOMEBORO) topical astringent solution 1,000 mL, 1,000 mL, Topical, BID, Dedra Lopez APRN  •  aluminum-magnesium hydroxide-simethicone (MAALOX MAX) 400-400-40 MG/5ML suspension 15 mL, 15 mL, Oral, Q6H PRN, Tatiana Reynoso APRN  •  calcium gluconate 1g/50ml 0.675% NaCl IV SOLN, 1 g, Intravenous, PRN **AND** calcium gluconate 2-0.675 GM/100ML NACL IVPB, 2 g, Intravenous, PRN **AND** Calcium, Ionized, , , PRN, Joe Angeles MD  •  dextrose (D50W) (25 g/50 mL) IV injection 25 g, 25 g, Intravenous, Q15 Min PRN, Tatiana Reynoso APRCHENG  •  dextrose (GLUTOSE) oral gel 15 g, 15 g, Oral, Q15 Min PRN, Tatiana Reynoso APRN  •  diphenhydrAMINE (BENADRYL) injection 25 mg, 25 mg, Intravenous, Once, Cherie Acuña APRN  •  glucagon (human recombinant) (GLUCAGEN DIAGNOSTIC) 1 mg in sterile water (preservative free) 1 mL injection, 1 mg, Subcutaneous, PRN, Tatiana Reynoso APRN  •  HYDROmorphone (DILAUDID) injection 1 mg, 1 mg, Intravenous, Q4H PRN, Tatiana Reynoso APRN, 1 mg at 11/19/21 0830  •  insulin lispro (ADMELOG) injection 0-7 Units, 0-7 Units, Subcutaneous, TID AC **AND** insulin lispro (ADMELOG) injection 0-7 Units, 0-7 Units, Subcutaneous, PRN, Tatiana Reynoso, MANISH  •  Lidocaine HCl gel (XYLOCAINE) urethral/mucosal syringe, , Urethral, PRN, Cherie Acuña, APRN  •  Magnesium Sulfate 2 gram Bolus, followed by 8 gram infusion (total Mg dose 10 grams)- Mg less than or equal to 1mg/dL, 2 g, Intravenous, PRN **OR** Magnesium " Sulfate 2 gram / 50mL Infusion (GIVE X 3 BAGS TO EQUAL 6GM TOTAL DOSE) - Mg 1.1 - 1.5 mg/dl, 2 g, Intravenous, PRN **OR** Magnesium Sulfate 4 gram infusion- Mg 1.6-1.9 mg/dL, 4 g, Intravenous, PRN, KellamEula mccraysea Z, APRN  •  melatonin tablet 5 mg, 5 mg, Oral, Nightly PRN, Eula Reynososea SHADI, APRN  •  ondansetron (ZOFRAN) tablet 4 mg, 4 mg, Oral, Q6H PRN **OR** ondansetron (ZOFRAN) injection 4 mg, 4 mg, Intravenous, Q6H PRN, Eula Reynososea SHADI, APRN  •  piperacillin-tazobactam (ZOSYN) IVPB 3.375 g in 100 mL NS (CD), 3.375 g, Intravenous, Q8H, Eula Reynososea SHADI, APRN, Last Rate: 25 mL/hr at 11/19/21 1318, 3.375 g at 11/19/21 1318  •  potassium & sodium phosphates (PHOS-NAK) 280-160-250 MG packet - for Phosphorus less than 1.25 mg/dL, 2 packet, Oral, Q6H PRN **OR** potassium & sodium phosphates (PHOS-NAK) 280-160-250 MG packet - for Phosphorus 1.25 - 2.5 mg/dL, 2 packet, Oral, Q6H PRN, Joe Angeles MD  •  potassium chloride (K-DUR,KLOR-CON) CR tablet 40 mEq, 40 mEq, Oral, PRN **OR** potassium chloride (KLOR-CON) packet 40 mEq, 40 mEq, Oral, PRN, 40 mEq at 11/19/21 1231 **OR** potassium chloride 10 mEq in 100 mL IVPB, 10 mEq, Intravenous, Q1H PRN, Tatiana Reynoso, APRN  •  sodium chloride 0.9 % flush 10 mL, 10 mL, Intravenous, PRN, Cherie Acuña, APRN  •  sodium chloride 0.9 % flush 10 mL, 10 mL, Intravenous, Q12H, Tatiana Reynoso, APRN, 10 mL at 11/19/21 0826  •  sodium chloride 0.9 % flush 10 mL, 10 mL, Intravenous, PRN, Tatiana Reynoso, APRN  •  Zinc Oxide 16 % ointment, , Apply externally, BID, Dedra Lopez, MANISH    Results Review:  Lab Results (last 48 hours)     Procedure Component Value Units Date/Time    Body Fluid Culture - Body Fluid, Peritoneum [[id number]]  (Abnormal) Collected: 11/18/21 1611    Specimen: Body Fluid from Peritoneum Updated: 11/19/21 1154     Body Fluid Culture Heavy growth (4+) Streptococcus agalactiae (Group B)     Comment: This organism is considered to be universally  susceptible to penicillin.  No further antibiotic testing will be performed.        Gram Stain Many (4+) WBCs seen      Moderate (3+) Gram positive cocci in chains    POC Glucose Once [[id number]]  (Abnormal) Collected: 11/19/21 1145    Specimen: Blood Updated: 11/19/21 1154     Glucose 188 mg/dL      Comment: Serial Number: [id number]:  [id number]       Hemoglobin A1c [[id number]]  (Abnormal) Collected: 11/18/21 1204    Specimen: Blood Updated: 11/19/21 1054     Hemoglobin A1C 10.0 %     Narrative:      Hemoglobin A1C Reference Range:    <5.7 %        Normal  5.7-6.4 %     Increased risk for diabetes  > 6.4 %        Diabetes       These guidelines have been recommended by the American Diabetic Association for Hgb A1c.      The following 2010 guidelines have been recommended by the American Diabetes Association for Hemoglobin A1c.    HBA1c 5.7-6.4% Increased risk for future diabetes (pre-diabetes)  HBA1c     >6.4% Diabetes      Urine Culture - Urine, Urine, Clean Catch [[id number]] Collected: 11/18/21 1305    Specimen: Urine, Clean Catch Updated: 11/19/21 0754    POC Glucose Once [[id number]]  (Abnormal) Collected: 11/19/21 0719    Specimen: Blood Updated: 11/19/21 0720     Glucose 159 mg/dL      Comment: Serial Number: [id number]:  [id number]       Basic Metabolic Panel [[id number]]  (Abnormal) Collected: 11/19/21 0401    Specimen: Blood Updated: 11/19/21 0505     Glucose 146 mg/dL      BUN 14 mg/dL      Creatinine 0.67 mg/dL      Sodium 135 mmol/L      Potassium 3.0 mmol/L      Chloride 92 mmol/L      CO2 34.0 mmol/L      Calcium 8.5 mg/dL      eGFR Non African Amer 88 mL/min/1.73      BUN/Creatinine Ratio 20.9     Anion Gap 9.0 mmol/L     Narrative:      GFR Normal >60  Chronic Kidney Disease <60  Kidney Failure <15      Magnesium [[id number]]  (Normal) Collected: 11/19/21 0401    Specimen: Blood Updated: 11/19/21 0505     Magnesium 1.7 mg/dL     CBC Auto Differential [[id number]]  (Abnormal) Collected:  11/19/21 0401    Specimen: Blood Updated: 11/19/21 0454     WBC 15.30 10*3/mm3      RBC 3.46 10*6/mm3      Hemoglobin 8.2 g/dL      Hematocrit 25.8 %      MCV 74.4 fL      MCH 23.6 pg      MCHC 31.7 g/dL      RDW 17.3 %      RDW-SD 45.5 fl      MPV 7.1 fL      Platelets 469 10*3/mm3      Neutrophil % 80.3 %      Lymphocyte % 10.2 %      Monocyte % 8.7 %      Eosinophil % 0.5 %      Basophil % 0.3 %      Neutrophils, Absolute 12.30 10*3/mm3      Lymphocytes, Absolute 1.60 10*3/mm3      Monocytes, Absolute 1.30 10*3/mm3      Eosinophils, Absolute 0.10 10*3/mm3      Basophils, Absolute 0.00 10*3/mm3      nRBC 0.0 /100 WBC     Folate [[id number]]  (Abnormal) Collected: 11/18/21 1204    Specimen: Blood Updated: 11/18/21 2024     Folate 4.09 ng/mL     Narrative:      Results may be falsely increased if patient taking Biotin.      Vitamin B12 [[id number]]  (Normal) Collected: 11/18/21 1204    Specimen: Blood Updated: 11/18/21 2024     Vitamin B-12 319 pg/mL     Narrative:      Results may be falsely increased if patient taking Biotin.      POC Glucose Once [[id number]]  (Abnormal) Collected: 11/18/21 1954    Specimen: Blood Updated: 11/1955     Glucose 205 mg/dL      Comment: Serial Number: [id number]:  [id number]       Magnesium [[id number]]  (Normal) Collected: 11/18/21 1204    Specimen: Blood Updated: 11/18/21 1742     Magnesium 1.6 mg/dL     POC Glucose Once [[id number]]  (Abnormal) Collected: 11/18/21 1727    Specimen: Blood Updated: 11/18/21 1728     Glucose 180 mg/dL      Comment: Serial Number: [id number]:  [id number]       COVID PRE-OP / PRE-PROCEDURE SCREENING ORDER (NO ISOLATION) - Swab, Nasopharynx [[id number]]  (Normal) Collected: 11/18/21 1518    Specimen: Swab from Nasopharynx Updated: 11/18/21 1549    Narrative:      The following orders were created for panel order COVID PRE-OP / PRE-PROCEDURE SCREENING ORDER (NO ISOLATION) - Swab, Nasopharynx.  Procedure                                Abnormality         Status                     ---------                               -----------         ------                     COVID-19,CEPHEID/ISELA/BD...[[id number]]  Normal              Final result                 Please view results for these tests on the individual orders.    COVID-19,CEPHEID/ISLEA/BDMAX,COR/TUNDE/PAD/KIMO IN-HOUSE(OR EMERGENT/ADD-ON),NP SWAB IN TRANSPORT MEDIA 3-4 HR TAT, RT-PCR - Swab, Nasopharynx [[id number]]  (Normal) Collected: 11/18/21 1518    Specimen: Swab from Nasopharynx Updated: 11/18/21 1549     COVID19 Not Detected    Narrative:      Fact sheet for providers: https://www.fda.gov/media/260213/download     Fact sheet for patients: https://www.fda.gov/media/565129/download  Fact sheet for providers: https://www.fda.gov/media/717273/download    Fact sheet for patients: https://www.fda.gov/media/556641/download    Test performed by PCR.    Ferritin [[id number]]  (Abnormal) Collected: 11/18/21 1204    Specimen: Blood Updated: 11/18/21 1538     Ferritin 555.60 ng/mL     Narrative:      Results may be falsely decreased if patient taking Biotin.      Iron [[id number]]  (Abnormal) Collected: 11/18/21 1204    Specimen: Blood Updated: 11/18/21 1531     Iron 16 mcg/dL     Blood Culture - Blood, Arm, Right [[id number]] Collected: 11/18/21 1449    Specimen: Blood from Arm, Right Updated: 11/18/21 1453    Blood Culture - Blood, Arm, Right [[id number]] Collected: 11/18/21 1358    Specimen: Blood from Arm, Right Updated: 11/18/21 1359    Urinalysis, Microscopic Only - Urine, Clean Catch [[id number]]  (Abnormal) Collected: 11/18/21 1305    Specimen: Urine, Clean Catch Updated: 11/18/21 1316     RBC, UA 13-20 /HPF      WBC, UA Too Numerous to Count /HPF      Bacteria, UA 2+ /HPF      Squamous Epithelial Cells, UA 3-6 /HPF      Hyaline Casts, UA None Seen /LPF      Methodology Manual Light Microscopy    [place] Draw [[id number]] Collected: 11/18/21 1204    Specimen: Blood Updated: 11/18/21 1319     Narrative:      The following orders were created for panel order [place] Draw.  Procedure                               Abnormality         Status                     ---------                               -----------         ------                     Green Top (Gel)[[id number]]                                  Final result               Lavender Top[[id number]]                                     Final result               Gold Top - SST[[id number]]                                   Final result               Light Blue Top[[id number]]                                   Final result                 Please view results for these tests on the individual orders.    Gold Top - SST [[id number]] Collected: 11/18/21 1204    Specimen: Blood Updated: 11/18/21 1315     Extra Tube Hold for add-ons.     Comment: Auto resulted.       Green Top (Gel) [[id number]] Collected: 11/18/21 1204    Specimen: Blood Updated: 11/18/21 1315     Extra Tube Hold for add-ons.     Comment: Auto resulted.       Light Blue Top [[id number]] Collected: 11/18/21 1204    Specimen: Blood Updated: 11/18/21 1315     Extra Tube hold for add-on     Comment: Auto resulted       Urinalysis With Microscopic If Indicated (No Culture) - Urine, Clean Catch [[id number]]  (Abnormal) Collected: 11/18/21 1305    Specimen: Urine, Clean Catch Updated: 11/18/21 1313     Color, UA Dark Yellow     Appearance, UA Turbid     Comment: Result checked         pH, UA 5.5     Specific Gravity, UA 1.019     Glucose, UA Negative     Ketones, UA Negative     Bilirubin, UA Negative     Blood, UA Large (3+)     Protein,  mg/dL (2+)     Leuk Esterase, UA Large (3+)     Nitrite, UA Negative     Urobilinogen, UA 1.0 E.U./dL    POC Glucose Once [[id number]]  (Abnormal) Collected: 11/18/21 1303    Specimen: Blood Updated: 11/18/21 1304     Glucose 193 mg/dL      Comment: Serial Number: [id number]:  [id number]       Comprehensive Metabolic Panel [[id number]]  (Abnormal) Collected:  11/18/21 1204    Specimen: Blood Updated: 11/18/21 1253     Glucose 191 mg/dL      BUN 15 mg/dL      Creatinine 0.72 mg/dL      Sodium 134 mmol/L      Potassium 3.5 mmol/L      Chloride 88 mmol/L      CO2 35.0 mmol/L      Calcium 8.5 mg/dL      Total Protein 7.3 g/dL      Albumin 2.00 g/dL      ALT (SGPT) <5 U/L      AST (SGOT) 9 U/L      Alkaline Phosphatase 175 U/L      Total Bilirubin 0.5 mg/dL      eGFR Non African Amer 81 mL/min/1.73      Globulin 5.3 gm/dL      A/G Ratio 0.4 g/dL      BUN/Creatinine Ratio 20.8     Anion Gap 11.0 mmol/L     Narrative:      GFR Normal >60  Chronic Kidney Disease <60  Kidney Failure <15      Lipase [[id number]]  (Abnormal) Collected: 11/18/21 1204    Specimen: Blood Updated: 11/18/21 1241     Lipase 8 U/L     CBC & Differential [[id number]]  (Abnormal) Collected: 11/18/21 1204    Specimen: Blood Updated: 11/18/21 1219    Narrative:      The following orders were created for panel order CBC & Differential.  Procedure                               Abnormality         Status                     ---------                               -----------         ------                     CBC Auto Differential[[id number]]        Abnormal            Final result               Scan Slide[[id number]]                                                                    Please view results for these tests on the individual orders.    CBC Auto Differential [[id number]]  (Abnormal) Collected: 11/18/21 1204    Specimen: Blood Updated: 11/18/21 1219     WBC 19.50 10*3/mm3      RBC 3.85 10*6/mm3      Hemoglobin 8.9 g/dL      Hematocrit 28.4 %      MCV 73.9 fL      MCH 23.2 pg      MCHC 31.4 g/dL      RDW 17.6 %      RDW-SD 45.9 fl      MPV 6.8 fL      Platelets 517 10*3/mm3      Neutrophil % 86.5 %      Lymphocyte % 5.6 %      Monocyte % 7.2 %      Eosinophil % 0.0 %      Basophil % 0.7 %      Neutrophils, Absolute 16.90 10*3/mm3      Lymphocytes, Absolute 1.10 10*3/mm3      Monocytes, Absolute 1.40 10*3/mm3       Eosinophils, Absolute 0.00 10*3/mm3      Basophils, Absolute 0.10 10*3/mm3      nRBC 0.1 /100 WBC     Lavender Top [[id number]] Collected: 11/18/21 1204    Specimen: Blood Updated: 11/18/21 1219     Extra Tube done    POC Lactate [[id number]]  (Normal) Collected: 11/18/21 1208    Specimen: Blood Updated: 11/18/21 1210     Lactate 1.2 mmol/L      Comment: Serial Number: [id number]:  [id number]           Imaging Results (Last 72 Hours)     Procedure Component Value Units Date/Time    CT Guided Abscess Drain Peritoneal [[id number]] Collected: 11/18/21 1654     Updated: 11/18/21 1700    Narrative:         DATE OF EXAM:   11/18/2021 3:44 PM     PROCEDURE:   CT GUIDED ABSCESS DRAIN PERITONEAL-     INDICATIONS:   Large left lower quadrant abscess collection. R10.32-Left lower quadrant  pain; R50.9-Fever, unspecified; K65.1-Peritoneal abscess;  Z85.44-Personal history of malignant neoplasm of other female genital  organs; N30.01-Acute cystitis with hematuria     COMPARISON:  No Comparisons Available     Fluoroscopy time: 20.5 seconds     Sedation time: 26 minutes     Description procedure: The patient's medications were reviewed. Informed  consent was obtained following a detailed description of procedure  including all risks. Simply discussed was the chance of  procedure-related bleeding, infection, perforation of adjacent organ, as  well as potential failure of the drainage procedure to alleviate  infection, potentially requiring more aggressive surgical procedure or  additional drain placement. The patient voiced understanding and wished  to proceed.     The patient was placed supine on the CT table. A brief noncontrasted  scan was performed to region of interest using a localization grid. The  approximately 11 cm x 7 cm abscess collection was localized, and the  overlying skin marked and the area prepped and draped sterilely.  Lidocaine was used for local anesthesia. Access was obtained using an  18-gauge  coaxial guiding needle. A Minor wire was coiled within the  collection and the tract was dilated to accommodate a 10 [place]uv[place] pigtail  all-purpose drain catheter which was placed in the posterior aspect of  the collection. The catheter loop was formed and locked. The catheter  was then aspirated by syringe which yielded 400 cc of grossly purulent  material. Samples were sent for culture and sensitivity. The catheter  was flushed with sterile saline and attached to KATIE bulb suction.  Follow-up scan demonstrates near complete evacuation of the abscess  collection. The catheter was secured in place using 0 suture material.  Sterile dressing was applied. The catheter continued to drain purulent  material at the end of the procedure.     Patient tolerated the procedure well with no immediate post procedure  complication. The procedure was performed under moderate conscious  sedation with continuous monitoring using Versed and fentanyl. 26  minutes of physician monitored sedation were provided.       Impression:      IMPRESSION :   CT-guided abscess drain catheter for drainage of large left abdominal  pelvic abscess collection, as described above.     Electronically Signed By-Shane Andrade MD On:11/18/2021 4:58 PM  This report was finalized on [id number] by  Shane Andrade MD.    CT Abdomen Pelvis With Contrast [[id number]] Collected: 11/18/21 1333     Updated: 11/18/21 1347    Narrative:      CT ABDOMEN PELVIS W CONTRAST-     Date of Exam: 11/18/2021 1:19 PM     Indication: Left lower quadrant pain, history of valvular cancer.     Comparison: None available.     Technique: CT scan of the abdomen and pelvis was performed after the  uneventful administration of 100 mL IV Isovue 370.  Automated exposure  control and iterative reconstruction methods were used.     FINDINGS:  The lung bases are clear.     There is a 9.3 x 10.8 x 25.3 cm lobulated rim-enhancing collection that  extends from the left lateral abdomen to involve the  left iliacus and  left psoas muscles, as well as tracking along the left lateral pelvic  wall and extending inferiorly into the left obturator space as well as  the left posterior hip.     The uterus is surgically absent. There is soft tissue thickening with  ulceration involving the perineum.     There is hepatic steatosis. The gallbladder, adrenal glands, kidneys,  spleen, and pancreas are unremarkable.     The stomach appears normal. The small bowel appears normal in caliber  and configuration. The colon appears normal. The appendix is not  well-visualized. There is trace pelvic ascites. No abnormally enlarged  lymph nodes are identified.     The rectum and urinary bladder are unremarkable. There is infiltration  of the subcutaneous tissues along the left lateral hip likely secondary  to the collection described above.     No aggressive osseous lesions are identified.       Impression:      1.Soft tissue thickening with ulceration involving peritoneum, which  could represent known valvular cancer. Recommend correlation with direct  inspection.  2.Large 25 cm lobulated rim-enhancing collection along left abdomen and  pelvis as described above, most concerning for an abscess. This would be  amenable to percutaneous drainage.  3.Trace pelvic free fluid.  4.Hepatic steatosis.     Electronically Signed By-Marvin Medina MD On:11/18/2021 1:45 PM  This report was finalized on [id number] by  Marvin Medina MD.          Review of Systems:  Review of Systems   Constitutional: Negative.    HENT: Negative.    Respiratory: Negative for cough and shortness of breath.    Cardiovascular: Positive for leg swelling. Negative for chest pain.   Gastrointestinal: Positive for constipation. Negative for nausea and vomiting.   Genitourinary: Negative for dysuria.   Skin: Positive for wound.   Psychiatric/Behavioral: Negative.        Physical Assessment:      Moisture associated dermatitis: Patient has excoriation and open ulcer  from the perirectal area to the buttock cleft proximate size of the area is about 5 cm x 1cm the area is friable in appearance pink clean no odor erythema or induration is noted the area is quite tender to touch even with gentle mild palpation.    Final diagnosis  Moisture associated dermatitis    Recommendation and Plan  We will recommend treating with an astringent such as Domeboro solution 120 twice daily x15 to 20 minutes applied to the affected area this should help dry up the wet weepy skin as well as be soothing and increase comfort.  Also will recommend stopping the Aquaphor and changing over to Oralia's Butt paste a zinc oxide should be more soothing and should hold up and protect the area better.  Also will order a waffle chair cushion she can take this home this will assist with discomfort when patient sitting up.  Will also utilize a lower loss therapy system while in the hospital to help manage and control moisture at the site    MANISH Villa   11/19/2021   13:40 EST

## 2021-11-19 NOTE — PLAN OF CARE
Goal Outcome Evaluation:  Plan of Care Reviewed With: patient           Outcome Summary: 65 yo female adm for onset of severe LLQ pain. Dx w/ intra-abdominal abscess, uti, anemia. CT of abdomen (+) for L abdominal/pelvic 25 cm loculated abscess, as well as possible pelvic tumor. Pt has 400 ml drained from abscess on 11/18. Fluid (+) for strep agalactie. Hx of vulvar cancer w/ multiple surgeries two years ago. Pt no longer actively getting chemo or XRT. Pt lives alone at home w/ her 7 yo son (who is currently staying w/ pt's adult dtr). She had been able to amb independently until past few weeks, when pain has become too severe. She has been needing her neighbor to come over to help her out of bed for past few days, then uses her rollator wx to amb to living area. Today, pt is extremely painful w/ movement of BLEs. She can barely tolerate hip flexion to 25* for either hip. She has normal strength in BUEs. Pt will require assist of 2 to safely tolerate movement to sitting position. Goal then would be to move toward independence in trasnfers in/out of w/c. Goals would include ambulation once pain is more controlled. Pt is not safe for home at this time. She will need IP rehab at d/c. Will follow.

## 2021-11-19 NOTE — THERAPY EVALUATION
Patient Name: Carmella Pelayo  : 1955    MRN: [id number]                              Today's Date: 2021       Admit Date: 2021    Visit Dx:     ICD-10-CM ICD-9-CM   1. Left lower quadrant abdominal pain  R10.32 789.04   2. Fever, unspecified fever cause  R50.9 780.60   3. Intra-abdominal abscess (HCC)  K65.1 567.22   4. History of cancer of vulva  Z85.44 V10.44   5. Acute cystitis with hematuria  N30.01 595.0     Patient Active Problem List   Diagnosis   • Recurrent malignant neoplasm (HCC)   • Malignant neoplasm of vulva (HCC)   • Type 2 diabetes mellitus (HCC)   • Thrombocytosis   • Left lower quadrant abdominal pain   • Abscess of abdominal wall   • Acute UTI (urinary tract infection)   • Anemia   • Dermatitis associated with moisture     Past Medical History:   Diagnosis Date   • Diabetes mellitus (HCC)    • Vulva cancer (HCC)      Past Surgical History:   Procedure Laterality Date   • VULVECTOMY        General Information     Row Name 21 1525          Physical Therapy Time and Intention    Document Type evaluation  -CM     Mode of Treatment physical therapy  -CM     Row Name 21 1525          General Information    Patient Profile Reviewed yes  -CM     Prior Level of Function min assist:; all household mobility; gait; transfer  does not have w/c; has rollator wx; neighbor has been coming over to help pt out of bed, then she walks to chair.  -CM     Existing Precautions/Restrictions fall; other (see comments)  pelvic abscess  -CM     Barriers to Rehab previous functional deficit; medically complex  -CM     Row Name 21 1525          Home Main Entrance    Number of Stairs, Main Entrance none  ramp  -CM     Row Name 21 1525          Stairs Within Home, Primary    Number of Stairs, Within Home, Primary none  -CM     Row Name 21 1525          Cognition    Orientation Status (Cognition) oriented x 4; other (see comments)  pleasant, cooperative  -CM     Row Name  11/19/21 1525          Safety Issues, Functional Mobility    Impairments Affecting Function (Mobility) pain; range of motion (ROM); sensation/sensory awareness  -CM           User Key  (r) = Recorded By, (t) = Taken By, (c) = Cosigned By    Initials Name Provider Type    Collette Bird, PT Physical Therapist               Mobility     Row Name 11/19/21 1527          Bed Mobility    Bed Mobility bed mobility (all) activities  -CM     All Activities, [place] (Bed Mobility) unable to assess  -CM     Comment (Bed Mobility) pt too painful w/ LE movement to tolerate eob position this date. Had dilaudid pain meds via iv at start of rx. Painfree at rest w/ pain meds, but extremely painful w/ movement of LEs.  -CM     Row Name 11/19/21 1527          Bed-Chair Transfer    Bed-Chair [place] (Transfers) unable to assess  -CM     Row Name 11/19/21 1527          Sit-Stand Transfer    Sit-Stand [place] (Transfers) unable to assess  -CM     Row Name 11/19/21 1527          Gait/Stairs (Locomotion)    [place] Level (Gait) unable to assess  -CM           User Key  (r) = Recorded By, (t) = Taken By, (c) = Cosigned By    Initials Name Provider Type    Collette Bird, PT Physical Therapist               Obj/Interventions     Row Name 11/19/21 1528          Range of Motion Comprehensive    General Range of Motion bilateral upper extremity ROM WFL; lower extremity range of motion deficits identified  -CM     Comment, General Range of Motion LLE limited to < 25* flexion at hip in supine position 2* pain in L hip and LLQ w/ movement; RLE limited to <20* motion at hip due to pain onset at perineal region from multiple surgeries following cancer dx.  -CM     Row Name 11/19/21 1528          Strength Comprehensive (MMT)    General Manual Muscle Testing (MMT) Assessment lower extremity strength deficits identified  -CM     Comment, General Manual Muscle Testing (MMT) Assessment UE strength wnl; unable to assess LE  strength due to severe pain w/ movement.  -CM     Row Name 11/19/21 1528          Balance    Balance Assessment sitting static balance  -CM     Static Sitting Balance unable to perform activity; other (see comments)  limited by pain this date.  -CM     Row Name 11/19/21 1528          Sensory Assessment (Somatosensory)    Sensory Assessment (Somatosensory) sensation intact; other (see comments)  some paresthesias in LLE  -CM           User Key  (r) = Recorded By, (t) = Taken By, (c) = Cosigned By    Initials Name Provider Type    Collette Bird, PT Physical Therapist               Goals/Plan     Row Name 11/19/21 1540          Bed Mobility Goal 1 (PT)    Activity/Assistive Device (Bed Mobility Goal 1, PT) bed mobility activities, all  -CM     [place] Level/Cues Needed (Bed Mobility Goal 1, PT) minimum assist (75% or more patient effort); 2 person assist  -CM     Time Frame (Bed Mobility Goal 1, PT) 2 weeks  -CM     Row Name 11/19/21 1540          Transfer Goal 1 (PT)    Activity/Assistive Device (Transfer Goal 1, PT) transfers, all; walker, rolling; bed-to-chair/chair-to-bed; sit-to-stand/stand-to-sit  -CM     [place] Level/Cues Needed (Transfer Goal 1, PT) minimum assist (75% or more patient effort); moderate assist (50-74% patient effort); 1 person assist  -CM     Row Name 11/19/21 1540          Gait Training Goal 1 (PT)    Activity/Assistive Device (Gait Training Goal 1, PT) gait (walking locomotion); walker, rolling  -CM     [place] Level (Gait Training Goal 1, PT) minimum assist (75% or more patient effort); moderate assist (50-74% patient effort); 1 person assist  -CM     Distance (Gait Training Goal 1, PT) 25 ft  -CM           User Key  (r) = Recorded By, (t) = Taken By, (c) = Cosigned By    Initials Name Provider Type    Collette Bird, PT Physical Therapist               Clinical Impression     Row Name 11/19/21 1530          Pain    Additional Documentation Pain Scale: FACES  Pre/Post-Treatment (Group)  -CM     Row Name 11/19/21 1530          Pain Scale: Numbers Pre/Post-Treatment    Pain Intervention(s) Emotional support  -CM     Row Name 11/19/21 1670          Pain Scale: FACES Pre/Post-Treatment    Pain: FACES Scale, Pretreatment 0-->no hurt  -CM     Posttreatment Pain Rating 8-->hurts whole lot  w/ LE movement  -CM     Pain Location - Side Bilateral  -CM     Pain Location - Orientation lower  -CM     Pain Location extremity  -CM     Row Name 11/19/21 6435          Plan of Care Review    Plan of Care Reviewed With patient  -CM     Outcome Summary 65 yo female adm for onset of severe LLQ pain. Dx w/ intra-abdominal abscess, uti, anemia. CT of abdomen (+) for L abdominal/pelvic 25 cm loculated abscess, as well as possible pelvic tumor. Pt has 400 ml drained from abscess on 11/18. Fluid (+) for strep agalactie. Hx of vulvar cancer w/ multiple surgeries two years ago. Pt no longer actively getting chemo or XRT. Pt lives alone at home w/ her 7 yo son (who is currently staying w/ pt's adult dtr). She had been able to amb independently until past few weeks, when pain has become too severe. She has been needing her neighbor to come over to help her out of bed for past few days, then uses her rollator wx to amb to living area. Today, pt is extremely painful w/ movement of BLEs. She can barely tolerate hip flexion to 25* for either hip. She has normal strength in BUEs. Pt will require assist of 2 to safely tolerate movement to sitting position. Goal then would be to move toward independence in trasnfers in/out of w/c. Goals would include ambulation once pain is more controlled. Pt is not safe for home at this time. She will need IP rehab at d/c. Will follow.  -CM     Row Name 11/19/21 9072          Therapy Assessment/Plan (PT)    Patient/Family Therapy Goals Statement (PT) agreeable to IP rehab  -CM     Criteria for Skilled Interventions Met (PT) yes; meets criteria; skilled treatment is  necessary  -CM     Row Name 11/19/21 1530          Positioning and Restraints    Pre-Treatment Position in bed  -CM     Post Treatment Position bed  -CM     In Bed notified nsg; fowlers; call light within reach; encouraged to call for assist; exit alarm on  -CM           User Key  (r) = Recorded By, (t) = Taken By, (c) = Cosigned By    Initials Name Provider Type    Collette Bird, PT Physical Therapist               Outcome Measures     Row Name 11/19/21 1541          How much help from another person do you currently need...    Turning from your back to your side while in flat bed without using bedrails? 1  -CM     Moving from lying on back to sitting on the side of a flat bed without bedrails? 1  -CM     Moving to and from a bed to a chair (including a wheelchair)? 1  -CM     Standing up from a chair using your arms (e.g., wheelchair, bedside chair)? 1  -CM     Climbing 3-5 steps with a railing? 1  -CM     To walk in hospital room? 1  -CM     AM-PAC 6 Clicks Score (PT) 6  -CM     Row Name 11/19/21 1541          Functional Assessment    Outcome Measure Options AM-PAC 6 Clicks Basic Mobility (PT)  -CM           User Key  (r) = Recorded By, (t) = Taken By, (c) = Cosigned By    Initials Name Provider Type    Collette Bird, PT Physical Therapist                             Physical Therapy Education                 Title: PT OT SLP Therapies (Done)     Topic: Physical Therapy (Done)     Point: Mobility training (Done)     Learning Progress Summary           Patient Acceptance, E,TB, VU,DU by  at 11/19/2021 1541                               User Key     Initials Effective Dates Name Provider Type Discipline     06/16/21 -  Collette Guidry, PT Physical Therapist PT              PT Recommendation and Plan  Planned Therapy Interventions (PT): balance training, bed mobility training, gait training, patient/family education, strengthening, stretching, ROM (range of motion), postural re-education,  transfer training  Plan of Care Reviewed With: patient  Outcome Summary: 67 yo female adm for onset of severe LLQ pain. Dx w/ intra-abdominal abscess, uti, anemia. CT of abdomen (+) for L abdominal/pelvic 25 cm loculated abscess, as well as possible pelvic tumor. Pt has 400 ml drained from abscess on 11/18. Fluid (+) for strep agalactie. Hx of vulvar cancer w/ multiple surgeries two years ago. Pt no longer actively getting chemo or XRT. Pt lives alone at home w/ her 9 yo son (who is currently staying w/ pt's adult dtr). She had been able to amb independently until past few weeks, when pain has become too severe. She has been needing her neighbor to come over to help her out of bed for past few days, then uses her rollator wx to amb to living area. Today, pt is extremely painful w/ movement of BLEs. She can barely tolerate hip flexion to 25* for either hip. She has normal strength in BUEs. Pt will require assist of 2 to safely tolerate movement to sitting position. Goal then would be to move toward independence in trasnfers in/out of w/c. Goals would include ambulation once pain is more controlled. Pt is not safe for home at this time. She will need IP rehab at d/c. Will follow.     Time Calculation:    PT Charges     Row Name 11/19/21 1542             Time Calculation    Start Time 1459  -CM      Stop Time 1515  -CM      Time Calculation (min) 16 min  -CM      PT Received On 11/19/21  -CM      PT - Next Appointment 11/22/21  -CM      PT Goal Re-Cert Due Date 12/03/21  -CM              Time Calculation- PT    Total Timed Code Minutes- PT 0 minute(s)  -CM            User Key  (r) = Recorded By, (t) = Taken By, (c) = Cosigned By    Initials Name Provider Type    CM Collette Guidry, PT Physical Therapist              Therapy Charges for Today     Code Description Service Date Service Provider Modifiers Qty    46602300770 HC PT EVAL MOD COMPLEXITY 3 11/19/2021 Collette Guidry, PT GP 1          PT G-Codes  Outcome  Measure Options: -[facility] 6 Clicks Basic Mobility (PT)  -[facility] 6 Clicks Score (PT): 6    Collette Guidry, PT  11/19/2021

## 2021-11-19 NOTE — CONSULTS
Infectious Diseases Consult Note    Referring Provider: Joe Angeles MD    Reason for Consultation: Dr. Carpenter    Patient Care Team:  Deana Wallace MD as PCP - General (Family Medicine)    Chief complaint abdominal pain, fever, chills    Subjective     The patient has been afebrile for the last 24 hours.  The patient is on room air, hemodynamically stable, and is tolerating antimicrobial therapy.    History of present illness:      This is a 66-year-old female who presents the hospital on 11/18/2021 with complaints of abdominal pain that been going on for approximately 2 weeks.  Patient states that he is also had some low-grade fevers and chills.  CT showed a large abdominal abscess in IR placed a drain on 11/18/2021 and removed proximately 400 L of purulent drainage.  She states that her abdominal pain has improved since the drain placement.  Patient also has some perianal and vaginal wounds due to previous surgery and moisture associated dermatitis .  Patient denies shortness of breath, cough, GI symptoms, or any urinary symptoms.  Patient was diagnosed with vulvar cancer back in 2019 and had surgery with chemotherapy and radiation.  Patient denies any recent treatment although she states that the cancer has come back and she is actually current with hospice at home.  However she does want treatment for the abdominal pain and infection.    She is currently in room air and does not appear to be in acute distress.  Patient's been afebrile since admission.  Patient is a creatinine of 0.67, white blood cell count of 15.3, hemoglobin 8.2, platelets of 469.  Initial blood cultures are negative and a urinalysis did show some back anterior and pyuria and a culture is pending.  COVID-19 screen is negative and fluid cultures are growing group B streptococcus.  Patient is currently on IV Zosyn and has no known antibiotic allergies.    Patient's other history includes type 2 diabetes and states that her blood sugars  are usually kept in control.  Patient denies any tobacco, alcohol, illicit drug abuse.    Review of Systems   Review of Systems   Constitutional: Positive for chills and fever.   HENT: Negative.    Eyes: Negative.    Respiratory: Negative.    Cardiovascular: Negative.    Gastrointestinal: Positive for abdominal pain.   Endocrine: Negative.    Genitourinary: Negative.    Musculoskeletal: Negative.    Skin: Positive for wound.   Neurological: Negative.    Psychiatric/Behavioral: Negative.    All other systems reviewed and are negative.      Medications  Medications Prior to Admission   Medication Sig Dispense Refill Last Dose   • HYDROmorphone (DILAUDID) 1 MG/ML liquid liquid Take 1 mg by mouth Every 4 (Four) Hours As Needed for Moderate Pain .   11/18/2021 at Unknown time   • ibuprofen (ADVIL,MOTRIN) 800 MG tablet Take 800 mg by mouth Every 8 (Eight) Hours As Needed for Mild Pain .   11/17/2021 at Unknown time       History  Past Medical History:   Diagnosis Date   • Diabetes mellitus (HCC)    • Vulva cancer (HCC)      Past Surgical History:   Procedure Laterality Date   • VULVECTOMY         Family History  History reviewed. No pertinent family history.    Social History   reports that she has never smoked. She has never used smokeless tobacco. She reports previous alcohol use. She reports previous drug use.    Allergies  Meperidine    Objective     Vital Signs   Vital Signs (last 24 hours)         11/18 0700  11/19 0659 11/19 0700  11/19 1652   Most Recent      Temp (°F) 98 -  98.8      97.9     97.9 (36.6) 11/19 1223    Heart Rate 85 -  107      89     89 11/19 1223    Resp 13 -  20      16     16 11/19 1223    /68 -  150/61      124/77     124/77 11/19 1223    SpO2 (%) 91 -  100      95     95 11/19 1223            Physical Exam:  Physical Exam  Vitals and nursing note reviewed.   Constitutional:       General: She is not in acute distress.     Appearance: Normal appearance. She is well-developed and normal  weight. She is not diaphoretic.   HENT:      Head: Normocephalic and atraumatic.   Eyes:      General: No scleral icterus.     Extraocular Movements: Extraocular movements intact.      Conjunctiva/sclera: Conjunctivae normal.      Pupils: Pupils are equal, round, and reactive to light.   Cardiovascular:      Rate and Rhythm: Normal rate and regular rhythm.      Heart sounds: Normal heart sounds, S1 normal and S2 normal. No murmur heard.      Pulmonary:      Effort: Pulmonary effort is normal. No respiratory distress.      Breath sounds: Normal breath sounds. No stridor. No wheezing or rales.   Chest:      Chest wall: No tenderness.   Abdominal:      General: Bowel sounds are normal. There is no distension.      Palpations: Abdomen is soft. There is no mass.      Tenderness: There is abdominal tenderness. There is no guarding.      Comments: She has a drain in place with some purulent drainage   Musculoskeletal:         General: No swelling, tenderness or deformity. Normal range of motion.      Cervical back: Neck supple.   Skin:     General: Skin is warm and dry.      Coloration: Skin is not pale.      Findings: No bruising, erythema or rash.      Comments: Reported perirectal wounds with denuded areas concerning for severe moisture associated dermatitis.  This was discussed with patient's RN and wound care notes were reviewed-patient did not want me to examine wounds at this time due to pain   Neurological:      General: No focal deficit present.      Mental Status: She is alert and oriented to person, place, and time. Mental status is at baseline.      Cranial Nerves: No cranial nerve deficit.   Psychiatric:         Mood and Affect: Mood normal.         Microbiology  Microbiology Results (last 10 days)       Procedure Component Value - Date/Time    Body Fluid Culture - Body Fluid, Peritoneum [[id number]]  (Abnormal) Collected: 11/18/21 1611    Lab Status: Final result Specimen: Body Fluid from Peritoneum Updated:  11/19/21 1154     Body Fluid Culture Heavy growth (4+) Streptococcus agalactiae (Group B)     Comment: This organism is considered to be universally susceptible to penicillin.  No further antibiotic testing will be performed.        Gram Stain Many (4+) WBCs seen      Moderate (3+) Gram positive cocci in chains    COVID PRE-OP / PRE-PROCEDURE SCREENING ORDER (NO ISOLATION) - Swab, Nasopharynx [[id number]]  (Normal) Collected: 11/18/21 1518    Lab Status: Final result Specimen: Swab from Nasopharynx Updated: 11/18/21 1549    Narrative:      The following orders were created for panel order COVID PRE-OP / PRE-PROCEDURE SCREENING ORDER (NO ISOLATION) - Swab, Nasopharynx.  Procedure                               Abnormality         Status                     ---------                               -----------         ------                     COVID-19,CEPHEID/ISELA/BD...[[id number]]  Normal              Final result                 Please view results for these tests on the individual orders.    COVID-19,CEPHEID/ISELA/BDMAX,COR/TUNDE/PAD/KIMO IN-HOUSE(OR EMERGENT/ADD-ON),NP SWAB IN TRANSPORT MEDIA 3-4 HR TAT, RT-PCR - Swab, Nasopharynx [[id number]]  (Normal) Collected: 11/18/21 1518    Lab Status: Final result Specimen: Swab from Nasopharynx Updated: 11/18/21 1549     COVID19 Not Detected    Narrative:      Fact sheet for providers: https://www.fda.gov/media/612685/download     Fact sheet for patients: https://www.fda.gov/media/411935/download  Fact sheet for providers: https://www.fda.gov/media/013787/download    Fact sheet for patients: https://www.fda.gov/media/557415/download    Test performed by PCR.    Blood Culture - Blood, Arm, Right [[id number]]  (Normal) Collected: 11/18/21 1449    Lab Status: Preliminary result Specimen: Blood from Arm, Right Updated: 11/19/21 1502     Blood Culture No growth at 24 hours    Blood Culture - Blood, Arm, Right [[id number]]  (Normal) Collected: 11/18/21 1358    Lab Status: Preliminary  result Specimen: Blood from Arm, Right Updated: 11/19/21 1402     Blood Culture No growth at 24 hours            Laboratory  Results from last 7 days   Lab Units 11/19/21  0401   WBC 10*3/mm3 15.30*   HEMOGLOBIN g/dL 8.2*   HEMATOCRIT % 25.8*   PLATELETS 10*3/mm3 469*     Results from last 7 days   Lab Units 11/19/21  0401   SODIUM mmol/L 135*   POTASSIUM mmol/L 3.0*   CHLORIDE mmol/L 92*   CO2 mmol/L 34.0*   BUN mg/dL 14   CREATININE mg/dL 0.67   GLUCOSE mg/dL 146*   CALCIUM mg/dL 8.5*     Results from last 7 days   Lab Units 11/19/21  0401   SODIUM mmol/L 135*   POTASSIUM mmol/L 3.0*   CHLORIDE mmol/L 92*   CO2 mmol/L 34.0*   BUN mg/dL 14   CREATININE mg/dL 0.67   GLUCOSE mg/dL 146*   CALCIUM mg/dL 8.5*                   Radiology  Imaging Results (Last 72 Hours)       Procedure Component Value Units Date/Time    CT Guided Abscess Drain Peritoneal [[id number]] Collected: 11/18/21 1654     Updated: 11/18/21 1700    Narrative:         DATE OF EXAM:   11/18/2021 3:44 PM     PROCEDURE:   CT GUIDED ABSCESS DRAIN PERITONEAL-     INDICATIONS:   Large left lower quadrant abscess collection. R10.32-Left lower quadrant  pain; R50.9-Fever, unspecified; K65.1-Peritoneal abscess;  Z85.44-Personal history of malignant neoplasm of other female genital  organs; N30.01-Acute cystitis with hematuria     COMPARISON:  No Comparisons Available     Fluoroscopy time: 20.5 seconds     Sedation time: 26 minutes     Description procedure: The patient's medications were reviewed. Informed  consent was obtained following a detailed description of procedure  including all risks. Simply discussed was the chance of  procedure-related bleeding, infection, perforation of adjacent organ, as  well as potential failure of the drainage procedure to alleviate  infection, potentially requiring more aggressive surgical procedure or  additional drain placement. The patient voiced understanding and wished  to proceed.     The patient was placed supine on  the CT table. A brief noncontrasted  scan was performed to region of interest using a localization grid. The  approximately 11 cm x 7 cm abscess collection was localized, and the  overlying skin marked and the area prepped and draped sterilely.  Lidocaine was used for local anesthesia. Access was obtained using an  18-gauge coaxial guiding needle. A Minor wire was coiled within the  collection and the tract was dilated to accommodate a 10 Pakistani pigtail  all-purpose drain catheter which was placed in the posterior aspect of  the collection. The catheter loop was formed and locked. The catheter  was then aspirated by syringe which yielded 400 cc of grossly purulent  material. Samples were sent for culture and sensitivity. The catheter  was flushed with sterile saline and attached to KATIE bulb suction.  Follow-up scan demonstrates near complete evacuation of the abscess  collection. The catheter was secured in place using 0 suture material.  Sterile dressing was applied. The catheter continued to drain purulent  material at the end of the procedure.     Patient tolerated the procedure well with no immediate post procedure  complication. The procedure was performed under moderate conscious  sedation with continuous monitoring using Versed and fentanyl. 26  minutes of physician monitored sedation were provided.       Impression:      IMPRESSION :   CT-guided abscess drain catheter for drainage of large left abdominal  pelvic abscess collection, as described above.     Electronically Signed By-Shane Andrade MD On:11/18/2021 4:58 PM  This report was finalized on [id number] by  Shane Andrade MD.    CT Abdomen Pelvis With Contrast [[id number]] Collected: 11/18/21 1333     Updated: 11/18/21 1347    Narrative:      CT ABDOMEN PELVIS W CONTRAST-     Date of Exam: 11/18/2021 1:19 PM     Indication: Left lower quadrant pain, history of valvular cancer.     Comparison: None available.     Technique: CT scan of the abdomen and  pelvis was performed after the  uneventful administration of 100 mL IV Isovue 370.  Automated exposure  control and iterative reconstruction methods were used.     FINDINGS:  The lung bases are clear.     There is a 9.3 x 10.8 x 25.3 cm lobulated rim-enhancing collection that  extends from the left lateral abdomen to involve the left iliacus and  left psoas muscles, as well as tracking along the left lateral pelvic  wall and extending inferiorly into the left obturator space as well as  the left posterior hip.     The uterus is surgically absent. There is soft tissue thickening with  ulceration involving the perineum.     There is hepatic steatosis. The gallbladder, adrenal glands, kidneys,  spleen, and pancreas are unremarkable.     The stomach appears normal. The small bowel appears normal in caliber  and configuration. The colon appears normal. The appendix is not  well-visualized. There is trace pelvic ascites. No abnormally enlarged  lymph nodes are identified.     The rectum and urinary bladder are unremarkable. There is infiltration  of the subcutaneous tissues along the left lateral hip likely secondary  to the collection described above.     No aggressive osseous lesions are identified.       Impression:      1.Soft tissue thickening with ulceration involving peritoneum, which  could represent known valvular cancer. Recommend correlation with direct  inspection.  2.Large 25 cm lobulated rim-enhancing collection along left abdomen and  pelvis as described above, most concerning for an abscess. This would be  amenable to percutaneous drainage.  3.Trace pelvic free fluid.  4.Hepatic steatosis.     Electronically Signed By-Marvin Medina MD On:11/18/2021 1:45 PM  This report was finalized on [id number] by  Marvin Medina MD.            Cardiology      Results Review:  I have reviewed all clinical data, test, lab, and imaging results.       Schedule Meds  aluminum sulfate-calcium acetate 1:20, 1,000 mL,  Topical, BID  diphenhydrAMINE, 25 mg, Intravenous, Once  folic acid, 1 mg, Oral, Daily  insulin lispro, 0-7 Units, Subcutaneous, TID AC  piperacillin-tazobactam, 3.375 g, Intravenous, Q8H  sodium chloride, 10 mL, Intravenous, Q12H  Zinc Oxide, , Apply externally, BID        Infusion Meds       PRN Meds  •  acetaminophen **OR** acetaminophen **OR** acetaminophen  •  aluminum-magnesium hydroxide-simethicone  •  Calcium Gluconate-NaCl **AND** calcium gluconate **AND** Calcium, Ionized  •  dextrose  •  dextrose  •  glucagon (human recombinant)  •  HYDROmorphone  •  insulin lispro **AND** insulin lispro  •  Lidocaine HCl gel  •  magnesium sulfate **OR** magnesium sulfate **OR** magnesium sulfate  •  melatonin  •  ondansetron **OR** ondansetron  •  potassium & sodium phosphates **OR** potassium & sodium phosphates  •  potassium chloride **OR** potassium chloride **OR** potassium chloride  •  sodium chloride  •  sodium chloride      Assessment/Plan       Assessment    Large left lower quadrant abscess.  Patient came in with a history of low-grade fever and chills along with abdominal pain.  Abscess was drained on 11/18/2021 and 400 mL of purulent drainage was removed.  Cultures are showing heavy growth of group B streptococcus.  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  However patient does have some chronic perirectal wounds    Chronic perirectal wounds from previous vulvectomy surgery in 2019 along with moisture associated dermatitis.  Patient would not allow me to examine her at this time but I reviewed the wound care notes it appears that there is fairly significant denuded areas with some drainage.    Type 2 diabetes-uncontrolled patient's A1c is 10    History of vulvar cancer with a vulvectomy and radiation and chemotherapy treatments approximately 2 years ago.  Patient has had no recent treatments and-she reports that the cancer has returned and she is current with hospice at  home  -Patient still has a port    Possible UTI.  Urinalysis showed some bacteria and pyuria however this is a clean-catch and with her wounds, this could be contamination    Mild leukocytosis-likely related to abscess        Plan    Discontinue IV Zosyn  Start IV Unasyn 3 g every 6 hours  Patient will need 2 weeks of treatment  Waiting on  culture  Wound culture of the perirectal area during next dressing change-this was discussed with the patient's RN at bedside  Wound care has already seen the patient about the perirectal wounds  Continue supportive care  A.m. labs  Patient is currently on hospice but wants treatment for the abdominal abscess      Aleyda Pinto, APRN  11/19/21  16:52 EST    Note is dictated utilizing voice recognition software/Dragon

## 2021-11-19 NOTE — DISCHARGE PLACEMENT REQUEST
"Carmella Moody (66 y.o. Female)             Date of Birth Social Security Number Address Home Phone MRN    1955  [id number] [facility] [place] [facility] [phone] [id number]    [employer] Marital Status             None        Admission Date Admission Type Admitting Provider Attending Provider Department, Room/Bed    11/18/21 Emergency Joe Angeles MD Olisa, Charles O, MD [facility] 3C MEDICAL INPATIENT, 357/1    Discharge Date Discharge Disposition Discharge Destination                         Attending Provider: Joe Angeles MD    Allergies: Meperidine    Isolation: None   Infection: None   Code Status: CPR   Advance Care Planning Activity    Ht: 157.5 cm (62\")   Wt: 63.4 kg (139 lb 11.2 oz)    Admission Cmt: None   Principal Problem: Abscess of abdominal wall [L02.211]                 Active Insurance as of 11/18/2021     Primary Coverage     Payor Plan Insurance Group Employer/Plan Group    MEDICARE MEDICARE A & B      Payor Plan Address Payor Plan Phone Number Payor Plan Fax Number Effective Dates    [address] [phone]  9/1/2020 - None Entered    [facility] [id number]       Subscriber Name Subscriber Birth Date Member ID       CARMELLA MOODY 1955 [id number]           Secondary Coverage     Payor Plan Insurance Group Employer/Plan Group    [place]      Payor Plan Address Payor Plan Phone Number Payor Plan Fax Number Effective Dates    [address]   11/1/2021 - None Entered    [place] [address]       Subscriber Name Subscriber Birth Date Member ID       CARMELLA MOODY 1955 [id number]                 Emergency Contacts      (Rel.) Home Phone Work Phone Mobile Phone    magalie horan (Daughter) -- -- [phone]          "

## 2021-11-19 NOTE — PLAN OF CARE
Goal Outcome Evaluation:  Plan of Care Reviewed With: patient           Outcome Summary: patient resting with no complaints

## 2021-11-20 ENCOUNTER — APPOINTMENT (OUTPATIENT)
Dept: CT IMAGING | Facility: HOSPITAL | Age: 66
End: 2021-11-20

## 2021-11-20 LAB
ANION GAP SERPL CALCULATED.3IONS-SCNC: 7 MMOL/L (ref 5–15)
ANISOCYTOSIS BLD QL: ABNORMAL
BACTERIA SPEC AEROBE CULT: NORMAL
BUN SERPL-MCNC: 10 MG/DL (ref 8–23)
BUN/CREAT SERPL: 16.1 (ref 7–25)
CALCIUM SPEC-SCNC: 8.5 MG/DL (ref 8.6–10.5)
CHLORIDE SERPL-SCNC: 93 MMOL/L (ref 98–107)
CO2 SERPL-SCNC: 35 MMOL/L (ref 22–29)
CREAT SERPL-MCNC: 0.62 MG/DL (ref 0.57–1)
DEPRECATED RDW RBC AUTO: 45.1 FL (ref 37–54)
ERYTHROCYTE [DISTWIDTH] IN BLOOD BY AUTOMATED COUNT: 17.1 % (ref 12.3–15.4)
GFR SERPL CREATININE-BSD FRML MDRD: 96 ML/MIN/1.73
GLUCOSE BLDC GLUCOMTR-MCNC: 146 MG/DL (ref 70–105)
GLUCOSE BLDC GLUCOMTR-MCNC: 162 MG/DL (ref 70–105)
GLUCOSE BLDC GLUCOMTR-MCNC: 164 MG/DL (ref 70–105)
GLUCOSE BLDC GLUCOMTR-MCNC: 174 MG/DL (ref 70–105)
GLUCOSE SERPL-MCNC: 114 MG/DL (ref 65–99)
HCT VFR BLD AUTO: 27.8 % (ref 34–46.6)
HGB BLD-MCNC: 8.9 G/DL (ref 12–15.9)
LYMPHOCYTES # BLD MANUAL: 1.12 10*3/MM3 (ref 0.7–3.1)
LYMPHOCYTES NFR BLD MANUAL: 10 % (ref 5–12)
MAGNESIUM SERPL-MCNC: 1.5 MG/DL (ref 1.6–2.4)
MCH RBC QN AUTO: 23.9 PG (ref 26.6–33)
MCHC RBC AUTO-ENTMCNC: 32.2 G/DL (ref 31.5–35.7)
MCV RBC AUTO: 74.3 FL (ref 79–97)
MICROCYTES BLD QL: ABNORMAL
MONOCYTES # BLD: 1.12 10*3/MM3 (ref 0.1–0.9)
NEUTROPHILS # BLD AUTO: 8.96 10*3/MM3 (ref 1.7–7)
NEUTROPHILS NFR BLD MANUAL: 67 % (ref 42.7–76)
NEUTS BAND NFR BLD MANUAL: 13 % (ref 0–5)
PLATELET # BLD AUTO: 535 10*3/MM3 (ref 140–450)
PMV BLD AUTO: 6.6 FL (ref 6–12)
POIKILOCYTOSIS BLD QL SMEAR: ABNORMAL
POTASSIUM SERPL-SCNC: 3.8 MMOL/L (ref 3.5–5.2)
RBC # BLD AUTO: 3.74 10*6/MM3 (ref 3.77–5.28)
SCAN SLIDE: NORMAL
SMALL PLATELETS BLD QL SMEAR: ABNORMAL
SODIUM SERPL-SCNC: 135 MMOL/L (ref 136–145)
VARIANT LYMPHS NFR BLD MANUAL: 10 % (ref 19.6–45.3)
WBC MORPH BLD: NORMAL
WBC NRBC COR # BLD: 11.2 10*3/MM3 (ref 3.4–10.8)

## 2021-11-20 PROCEDURE — 83735 ASSAY OF MAGNESIUM: CPT | Performed by: NURSE PRACTITIONER

## 2021-11-20 PROCEDURE — 25010000002 CEFTRIAXONE PER 250 MG: Performed by: NURSE PRACTITIONER

## 2021-11-20 PROCEDURE — 87147 CULTURE TYPE IMMUNOLOGIC: CPT | Performed by: NURSE PRACTITIONER

## 2021-11-20 PROCEDURE — 85007 BL SMEAR W/DIFF WBC COUNT: CPT | Performed by: NURSE PRACTITIONER

## 2021-11-20 PROCEDURE — 63710000001 INSULIN LISPRO (HUMAN) PER 5 UNITS: Performed by: NURSE PRACTITIONER

## 2021-11-20 PROCEDURE — 0 IOPAMIDOL PER 1 ML: Performed by: INTERNAL MEDICINE

## 2021-11-20 PROCEDURE — 99233 SBSQ HOSP IP/OBS HIGH 50: CPT | Performed by: INTERNAL MEDICINE

## 2021-11-20 PROCEDURE — 80048 BASIC METABOLIC PNL TOTAL CA: CPT | Performed by: NURSE PRACTITIONER

## 2021-11-20 PROCEDURE — 25010000002 NA FERRIC GLUC CPLX PER 12.5 MG: Performed by: NURSE PRACTITIONER

## 2021-11-20 PROCEDURE — 71260 CT THORAX DX C+: CPT

## 2021-11-20 PROCEDURE — 87205 SMEAR GRAM STAIN: CPT | Performed by: NURSE PRACTITIONER

## 2021-11-20 PROCEDURE — 82962 GLUCOSE BLOOD TEST: CPT

## 2021-11-20 PROCEDURE — 0 AMPICILLIN-SULBACTAM PER 1.5 G: Performed by: NURSE PRACTITIONER

## 2021-11-20 PROCEDURE — 36415 COLL VENOUS BLD VENIPUNCTURE: CPT | Performed by: NURSE PRACTITIONER

## 2021-11-20 PROCEDURE — 85025 COMPLETE CBC W/AUTO DIFF WBC: CPT | Performed by: NURSE PRACTITIONER

## 2021-11-20 PROCEDURE — 87070 CULTURE OTHR SPECIMN AEROBIC: CPT | Performed by: NURSE PRACTITIONER

## 2021-11-20 PROCEDURE — 25010000002 HYDROMORPHONE PER 4 MG: Performed by: NURSE PRACTITIONER

## 2021-11-20 RX ORDER — METRONIDAZOLE 500 MG/1
500 TABLET ORAL EVERY 8 HOURS SCHEDULED
Status: DISCONTINUED | OUTPATIENT
Start: 2021-11-20 | End: 2021-11-21 | Stop reason: HOSPADM

## 2021-11-20 RX ADMIN — IOPAMIDOL 100 ML: 755 INJECTION, SOLUTION INTRAVENOUS at 18:04

## 2021-11-20 RX ADMIN — HYDROMORPHONE HYDROCHLORIDE 1 MG: 2 INJECTION, SOLUTION INTRAMUSCULAR; INTRAVENOUS; SUBCUTANEOUS at 09:50

## 2021-11-20 RX ADMIN — Medication: at 21:38

## 2021-11-20 RX ADMIN — SODIUM CHLORIDE, PRESERVATIVE FREE 10 ML: 5 INJECTION INTRAVENOUS at 08:27

## 2021-11-20 RX ADMIN — SODIUM CHLORIDE 3 G: 900 INJECTION INTRAVENOUS at 04:57

## 2021-11-20 RX ADMIN — CALCIUM ACETATE MONOHYDRATE AND ALUMINUM SULFATE TETRADECAHYDRATE 1000 ML: 952; 1347 POWDER, FOR SOLUTION TOPICAL at 08:27

## 2021-11-20 RX ADMIN — ALPRAZOLAM 0.25 MG: 0.25 TABLET ORAL at 16:05

## 2021-11-20 RX ADMIN — ALPRAZOLAM 0.25 MG: 0.25 TABLET ORAL at 21:38

## 2021-11-20 RX ADMIN — Medication: at 08:28

## 2021-11-20 RX ADMIN — HYDROMORPHONE HYDROCHLORIDE 1 MG: 2 INJECTION, SOLUTION INTRAMUSCULAR; INTRAVENOUS; SUBCUTANEOUS at 04:53

## 2021-11-20 RX ADMIN — ALPRAZOLAM 0.25 MG: 0.25 TABLET ORAL at 08:26

## 2021-11-20 RX ADMIN — HYDROMORPHONE HYDROCHLORIDE 1 MG: 2 INJECTION, SOLUTION INTRAMUSCULAR; INTRAVENOUS; SUBCUTANEOUS at 21:32

## 2021-11-20 RX ADMIN — HYDROMORPHONE HYDROCHLORIDE 1 MG: 2 INJECTION, SOLUTION INTRAMUSCULAR; INTRAVENOUS; SUBCUTANEOUS at 16:05

## 2021-11-20 RX ADMIN — INSULIN LISPRO 2 UNITS: 100 INJECTION, SOLUTION INTRAVENOUS; SUBCUTANEOUS at 21:38

## 2021-11-20 RX ADMIN — INSULIN LISPRO 2 UNITS: 100 INJECTION, SOLUTION INTRAVENOUS; SUBCUTANEOUS at 08:26

## 2021-11-20 RX ADMIN — FOLIC ACID 1 MG: 1 TABLET ORAL at 08:26

## 2021-11-20 RX ADMIN — SODIUM CHLORIDE 3 G: 900 INJECTION INTRAVENOUS at 12:10

## 2021-11-20 RX ADMIN — SODIUM CHLORIDE 250 MG: 9 INJECTION, SOLUTION INTRAVENOUS at 08:26

## 2021-11-20 RX ADMIN — SODIUM CHLORIDE, PRESERVATIVE FREE 10 ML: 5 INJECTION INTRAVENOUS at 21:32

## 2021-11-20 RX ADMIN — CEFTRIAXONE SODIUM 2 G: 2 INJECTION, POWDER, FOR SOLUTION INTRAMUSCULAR; INTRAVENOUS at 19:50

## 2021-11-20 RX ADMIN — INSULIN LISPRO 2 UNITS: 100 INJECTION, SOLUTION INTRAVENOUS; SUBCUTANEOUS at 12:10

## 2021-11-20 NOTE — PROGRESS NOTES
Infectious Diseases Progress Note      LOS: 2 days   Patient Care Team:  Deana Wallace MD as PCP - General (Family Medicine)    Chief Complaint: Abdominal pain, fever, chills at admission    Subjective       The patient has been afebrile for the last 24 hours.  The patient is on room air, hemodynamically stable, and is tolerating antimicrobial therapy.  Patient states that her abdominal pain is better today      Review of Systems:   Review of Systems   Constitutional: Negative.    HENT: Negative.    Eyes: Negative.    Respiratory: Negative.    Cardiovascular: Negative.    Gastrointestinal: Positive for abdominal pain.   Endocrine: Negative.    Genitourinary: Negative.    Musculoskeletal: Negative.    Skin: Positive for wound.   Neurological: Negative.    Psychiatric/Behavioral: Negative.    All other systems reviewed and are negative.       Objective     Vital Signs  Temp:  [97.8 °F (36.6 °C)-98.5 °F (36.9 °C)] 97.9 °F (36.6 °C)  Heart Rate:  [85-95] 86  Resp:  [16-18] 18  BP: (101-124)/(67-75) 118/75    Physical Exam:  Physical Exam  Vitals and nursing note reviewed.   Constitutional:       General: She is not in acute distress.     Appearance: Normal appearance. She is well-developed and normal weight. She is not diaphoretic.   HENT:      Head: Normocephalic and atraumatic.   Eyes:      General: No scleral icterus.     Extraocular Movements: Extraocular movements intact.      Conjunctiva/sclera: Conjunctivae normal.      Pupils: Pupils are equal, round, and reactive to light.   Cardiovascular:      Rate and Rhythm: Normal rate and regular rhythm.      Heart sounds: Normal heart sounds, S1 normal and S2 normal. No murmur heard.      Pulmonary:      Effort: Pulmonary effort is normal. No respiratory distress.      Breath sounds: Normal breath sounds. No stridor. No wheezing or rales.   Chest:      Chest wall: No tenderness.   Abdominal:      General: Bowel sounds are normal. There is no distension.       Palpations: Abdomen is soft. There is no mass.      Tenderness: There is abdominal tenderness. There is no guarding.      Comments: Drain with purulent drainage   Musculoskeletal:         General: No swelling, tenderness or deformity. Normal range of motion.      Cervical back: Neck supple.   Skin:     General: Skin is warm and dry.      Coloration: Skin is not pale.      Findings: No bruising, erythema or rash.      Comments: Reported perirectal wounds   Neurological:      General: No focal deficit present.      Mental Status: She is alert and oriented to person, place, and time. Mental status is at baseline.      Cranial Nerves: No cranial nerve deficit.   Psychiatric:         Mood and Affect: Mood normal.          Results Review:    I have reviewed all clinical data, test, lab, and imaging results.     Radiology  No Radiology Exams Resulted Within Past 24 Hours    Cardiology    Laboratory    Results from last 7 days   Lab Units 11/20/21 0445 11/19/21  0401 11/18/21  1204   WBC 10*3/mm3 11.20* 15.30* 19.50*   HEMOGLOBIN g/dL 8.9* 8.2* 8.9*   HEMATOCRIT % 27.8* 25.8* 28.4*   PLATELETS 10*3/mm3 535* 469* 517*     Results from last 7 days   Lab Units 11/20/21  0445 11/19/21  1930 11/19/21  0401 11/18/21  1204   SODIUM mmol/L 135*  --  135* 134*   POTASSIUM mmol/L 3.8 4.2 3.0* 3.5   CHLORIDE mmol/L 93*  --  92* 88*   CO2 mmol/L 35.0*  --  34.0* 35.0*   BUN mg/dL 10  --  14 15   CREATININE mg/dL 0.62  --  0.67 0.72   GLUCOSE mg/dL 114*  --  146* 191*   ALBUMIN g/dL  --   --   --  2.00*   BILIRUBIN mg/dL  --   --   --  0.5   ALK PHOS U/L  --   --   --  175*   AST (SGOT) U/L  --   --   --  9   ALT (SGPT) U/L  --   --   --  <5   LIPASE U/L  --   --   --  8*   CALCIUM mg/dL 8.5*  --  8.5* 8.5*                 Microbiology   Microbiology Results (last 10 days)     Procedure Component Value - Date/Time    Wound Culture - Wound, Vagina [[id number]] Collected: 11/20/21 1211    Lab Status: Preliminary result Specimen: Wound  from Vagina Updated: 11/20/21 1340     Gram Stain Many (4+) WBCs per low power field      Rare (1+) Gram negative bacilli    Body Fluid Culture - Body Fluid, Peritoneum [[id number]]  (Abnormal) Collected: 11/18/21 1611    Lab Status: Final result Specimen: Body Fluid from Peritoneum Updated: 11/19/21 1154     Body Fluid Culture Heavy growth (4+) Streptococcus agalactiae (Group B)     Comment: This organism is considered to be universally susceptible to penicillin.  No further antibiotic testing will be performed.        Gram Stain Many (4+) WBCs seen      Moderate (3+) Gram positive cocci in chains    COVID PRE-OP / PRE-PROCEDURE SCREENING ORDER (NO ISOLATION) - Swab, Nasopharynx [[id number]]  (Normal) Collected: 11/18/21 1518    Lab Status: Final result Specimen: Swab from Nasopharynx Updated: 11/18/21 1549    Narrative:      The following orders were created for panel order COVID PRE-OP / PRE-PROCEDURE SCREENING ORDER (NO ISOLATION) - Swab, Nasopharynx.  Procedure                               Abnormality         Status                     ---------                               -----------         ------                     COVID-19,CEPHEID/ISELA/BD...[[id number]]  Normal              Final result                 Please view results for these tests on the individual orders.    COVID-19,CEPHEID/ISELA/BDMAX,COR/TUNDE/PAD/KIMO IN-HOUSE(OR EMERGENT/ADD-ON),NP SWAB IN TRANSPORT MEDIA 3-4 HR TAT, RT-PCR - Swab, Nasopharynx [[id number]]  (Normal) Collected: 11/18/21 1518    Lab Status: Final result Specimen: Swab from Nasopharynx Updated: 11/18/21 1549     COVID19 Not Detected    Narrative:      Fact sheet for providers: https://www.fda.gov/media/772198/download     Fact sheet for patients: https://www.fda.gov/media/078948/download  Fact sheet for providers: https://www.fda.gov/media/999047/download    Fact sheet for patients: https://www.fda.gov/media/969324/download    Test performed by PCR.    Blood Culture - Blood, Arm,  Right [[id number]]  (Normal) Collected: 11/18/21 1449    Lab Status: Preliminary result Specimen: Blood from Arm, Right Updated: 11/20/21 1502     Blood Culture No growth at 2 days    Blood Culture - Blood, Arm, Right [[id number]]  (Normal) Collected: 11/18/21 1358    Lab Status: Preliminary result Specimen: Blood from Arm, Right Updated: 11/20/21 1402     Blood Culture No growth at 2 days    Urine Culture - Urine, Urine, Clean Catch [[id number]] Collected: 11/18/21 1305    Lab Status: Final result Specimen: Urine, Clean Catch Updated: 11/20/21 1328     Urine Culture >100,000 CFU/mL Mixed Juwan Isolated    Narrative:      Specimen contains mixed organisms of questionable pathogenicity which indicates contamination with commensal juwan.  Further identification is unlikely to provide clinically useful information.  Suggest recollection.          Medication Review:       Schedule Meds  ALPRAZolam, 0.25 mg, Oral, TID  aluminum sulfate-calcium acetate 1:20, 1,000 mL, Topical, BID  ampicillin-sulbactam, 3 g, Intravenous, Q6H  diphenhydrAMINE, 25 mg, Intravenous, Once  folic acid, 1 mg, Oral, Daily  insulin lispro, 0-7 Units, Subcutaneous, TID AC  sodium chloride, 10 mL, Intravenous, Q12H  Zinc Oxide, , Apply externally, BID        Infusion Meds       PRN Meds  •  acetaminophen **OR** acetaminophen **OR** acetaminophen  •  aluminum-magnesium hydroxide-simethicone  •  Calcium Gluconate-NaCl **AND** calcium gluconate **AND** Calcium, Ionized  •  dextrose  •  dextrose  •  glucagon (human recombinant)  •  HYDROmorphone  •  insulin lispro **AND** insulin lispro  •  Lidocaine HCl gel  •  magnesium sulfate **OR** magnesium sulfate **OR** magnesium sulfate  •  melatonin  •  ondansetron **OR** ondansetron  •  potassium & sodium phosphates **OR** potassium & sodium phosphates  •  potassium chloride **OR** potassium chloride **OR** potassium chloride  •  sodium chloride  •  sodium chloride        Assessment/Plan       Antimicrobial  Therapy   1.         2.        3.        4.        5.            Assessment     Large left lower quadrant abscess.  Patient came in with a history of low-grade fever and chills along with abdominal pain.  Abscess was drained on 11/18/2021 and 400 mL of purulent drainage was removed.  Cultures are showing heavy growth of group B streptococcus.  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  However patient does have some chronic perirectal wounds     Chronic perirectal wounds from previous vulvectomy surgery in 2019 along with moisture associated dermatitis.  Patient would not allow me to examine her at this time but I reviewed the wound care notes it appears that there is fairly significant denuded areas with some drainage.  -Wound cultures pending     Type 2 diabetes-uncontrolled patient's A1c is 10     History of vulvar cancer with a vulvectomy and radiation and chemotherapy treatments approximately 2 years ago.  Patient has had no recent treatments and-she reports that the cancer has returned and she is current with hospice at home  -Patient still has a port     Possible UTI.  Urinalysis showed some bacteria and pyuria however this is a clean-catch and with her wounds, this could be contamination     Mild leukocytosis-likely related to abscess  -Normalized           Plan     Discontinue IV Unasyn   Start IV Rocephin 2 g every 24 hours for 12 days for 14 days treatment  Start p.o. Flagyl 500 mg 3 times daily for 12 days for 14 days treatment  Patient has a port  Weekly labs getting CBC and creatinine x2 weeks  Continue supportive care  Okay to discharge from Infectious Disease standpoint  Patient is currently on hospice but wants treatment for the abdominal abscess     Please fax all post discharge lab results, imaging studies and correspondence to this fax number [phone]  For any question or concern please contact our service number [phone]    Aleyda Pinto  MANISH  11/20/21  17:58 EST    Note is dictated utilizing voice recognition software/Dragon

## 2021-11-20 NOTE — PROGRESS NOTES
[facility] Daily Progress Note    Patient Name: Carmella Pelayo  : 1955  MRN: [id number]  Primary Care Physician:  Deana Wallace MD  Date of admission: 2021      Subjective      Chief Complaint: Abdominal pain, chills and fever.      Patient Reports slight improvement in her symptoms.    Review of Systems   Constitutional: Negative.   HENT: Negative.    Eyes: Negative.    Cardiovascular: Negative.    Respiratory: Negative.    Endocrine: Negative.    Hematologic/Lymphatic: Negative.    Skin: Negative.    Musculoskeletal: Negative.    Gastrointestinal: Negative.    Genitourinary: Negative.    Neurological: Negative.    Psychiatric/Behavioral: Negative.    Allergic/Immunologic: Negative.    .        Objective      Vitals:   Temp:  [97.8 °F (36.6 °C)-98.5 °F (36.9 °C)] 97.9 °F (36.6 °C)  Heart Rate:  [85-95] 86  Resp:  [16-18] 18  BP: (101-124)/(67-75) 118/75    Physical Exam  Vitals and nursing note reviewed.   Constitutional:       General: She is not in acute distress.  HENT:      Head: Normocephalic and atraumatic.      Nose: Nose normal. No congestion or rhinorrhea.      Mouth/Throat:      Mouth: Mucous membranes are moist.      Pharynx: Oropharynx is clear. No oropharyngeal exudate or posterior oropharyngeal erythema.   Eyes:      Pupils: Pupils are equal, round, and reactive to light.   Cardiovascular:      Pulses: Normal pulses.      Heart sounds: Normal heart sounds. No murmur heard.  No friction rub. No gallop.       Comments: S1 and S2 present.  No tachycardia.  Pulmonary:      Effort: No respiratory distress.      Breath sounds: No wheezing, rhonchi or rales.   Chest:      Chest wall: No tenderness.   Abdominal:      General: Abdomen is flat. Bowel sounds are normal. There is no distension.      Palpations: Abdomen is soft.      Tenderness: There is no right CVA tenderness.   Musculoskeletal:         General: No swelling, tenderness, deformity  or signs of injury.      Cervical back: Neck supple. No tenderness.      Right lower leg: No edema.      Left lower leg: No edema.   Skin:     Capillary Refill: Capillary refill takes less than 2 seconds.      Coloration: Skin is not jaundiced.      Findings: No bruising, lesion or rash.   Neurological:      Mental Status: She is alert.      Comments: No facial asymmetry noted.  Gait and station not tested.   Psychiatric:      Comments: No agitation.               Result Review    Result Review:  I have personally reviewed the results from the time of this admission to 11/20/2021 15:04 EST and agree with these findings:  [x]  Laboratory  [x]  Microbiology  [x]  Radiology  []  EKG/Telemetry   []  Cardiology/Vascular   []  Pathology  []  Old records  []  Other:  Most notable findings include:           Assessment/Plan      Brief Patient Summary:  Patient is a 66 female with past medical history of IV drug use, diabetes mellitus type 2 and  vulvar cancer who presented to the emergency room because of fever and chills and abdominal pain.  Patient was seen in the emergency room and was diagnosed with abdominal wall abscess.  The abscess was drained and patient was started on antibiotics.  General surgery consult was completed.  Hematology and infectious disease consults were completed.        ALPRAZolam, 0.25 mg, Oral, TID  aluminum sulfate-calcium acetate 1:20, 1,000 mL, Topical, BID  ampicillin-sulbactam, 3 g, Intravenous, Q6H  diphenhydrAMINE, 25 mg, Intravenous, Once  folic acid, 1 mg, Oral, Daily  insulin lispro, 0-7 Units, Subcutaneous, TID AC  sodium chloride, 10 mL, Intravenous, Q12H  Zinc Oxide, , Apply externally, BID             Active Hospital Problems:  Active Hospital Problems    Diagnosis    • **Abscess of abdominal wall  Treat with antibiotics.      • Acute UTI (urinary tract infection)  Follow cultures.      • Dermatitis associated with moisture    • Malignant neoplasm of vulva (HCC)      Squamous cell  carcinoma     • Thrombocytosis  Continue to monitor.      • Left lower quadrant abdominal pain  Treat with pain control.      • Anemia  Monitor H&H.      • Recurrent malignant neoplasm (HCC)    • Type 2 diabetes mellitus (HCC)      Patient no longer taking metformin d/t insurance issues.  Patient no longer taking metformin d/t insurance issues.  Treat with insulin therapy.         Continue appropriate patient's home medications for other chronic medical conditions.  Continue the present level of care.  Patient and family agreed with the plan of care.      DVT prophylaxis:  Mechanical DVT prophylaxis orders are present.    CODE STATUS:    Level Of Support Discussed With: Patient  Code Status (Patient has no pulse and is not breathing): CPR (Attempt to Resuscitate)  Medical Interventions (Patient has pulse or is breathing): Full Support      Disposition: Pending patient's clinical improvement.    This patient has been examined wearing appropriate Personal Protective Equipment and discussed with hospital infection control department, [facility], infectious disease specialist and pulmonologist. 11/20/21      Electronically signed by Joe Angeles MD, FACP, 11/20/21, 15:04 EST.      [facility] Hospitalist Team

## 2021-11-20 NOTE — NURSING NOTE
Swabbed perianal area for wound culture. Provided wound care with Dombrero soaks and applied [employer]'s Butt Paste

## 2021-11-20 NOTE — NURSING NOTE
Placed a call to CT to inform them that the patient is ready for transport.    [id number]- called back CT regarding missing their call.

## 2021-11-20 NOTE — CASE MANAGEMENT/SOCIAL WORK
Continued Stay Note  [facility]     Patient Name: Carmella Pelayo  MRN: [id number]  Today's Date: 11/20/2021    Admit Date: 11/18/2021     Discharge Plan     Row Name 11/20/21 1216       Plan    Plan [facility] can accept; pending clinical course. No preceert required. PASRR per facility.               Discharge Codes    No documentation.               Phone communication or documentation only - no physical contact with patient or family.    Raissa Benítez RN  Weekend   [place] [id number]  Office: [phone]  Fax: [phone]  [email or web address]

## 2021-11-20 NOTE — PLAN OF CARE
Goal Outcome Evaluation:           Progress: no change  Outcome Summary: Patient rested comfortably all night with no concerns. Patient was quite anxious at times but felt better after receiving xanax

## 2021-11-20 NOTE — PROGRESS NOTES
Hematology/Oncology Inpatient Progress Note    PATIENT NAME: Carmella Pelayo  : 1955  MRN: [id number]    CHIEF COMPLAINT: Abdominal pain    HISTORY OF PRESENT ILLNESS:      Carmella Pelayo is a 66 y.o. female who presented to [facility] on 2021 with complaints of abdominal pain.  Patient reports to the ER provider that she has had left-sided abdominal pain for the last few weeks that has progressively worsened.  The the patient currently has on [facility] hospice with a past history diagnosis of vulvar cancer and vulvectomy in 2019, with recurrence after vulvectomy.  The CT abdomen pelvis shows there is soft thickening of tissue with ulceration involving the perineum, also a 25 cm lobulated collection along the left abdomen and pelvis which is concerning for abscess.  Labs and urinalysis along with blood culture obtained.  Zosyn IV in the ER was given and patient admitted for management of condition.     21  Hematology/Oncology was consulted history of recurrent vulvar cancer with vulvectomy in 2019, then recurrence and is a patient of Dr. Jose Borja at [facility] with last televisit on 2021.   The patient is stage Ib grade 2, SCC of the vulva.  S/p left radical Jorge vulvectomy, and left inguinal LND on 2019.  Patient declined adjuvant radiation with weekly cisplatin.  New left-sided vulvar lesion recurrent disease 2020.  EUA, cystoscopy and vulvar biopsy 2020 reveals squamous cell carcinoma invasive, of vulva.  PET/CT 2020 shows recurrent vulvar squamous cell cancer at site of biopsy no regional or distant metastasis disease.  Keytruda started 2020, development of pneumonitis 3/17/2021.  Personal decision made in 2021 to forego aggressive treatment of her disease and focus on quality of life issues per Dr. Castrejon note.      [facility]     She  has a past medical history of  Diabetes mellitus (HCC) and Vulva cancer (HCC).     PCP: Deana Wallace MD     History of present illness was reviewed and is unchanged from the previous visit. 11/20/21    Subjective      She feels better today.  Abdominal drain has a lot of drainage    ROS:  Review of Systems   Constitutional: Negative for chills, fatigue and fever.   HENT: Negative for nosebleeds and sore throat.    Eyes: Negative for discharge, itching and visual disturbance.   Respiratory: Negative for cough and shortness of breath.    Cardiovascular: Negative for chest pain and palpitations.   Gastrointestinal: Negative for constipation, diarrhea, nausea and vomiting.   Endocrine: Negative for cold intolerance and heat intolerance.   Genitourinary: Negative for dysuria and hematuria.   Musculoskeletal: Negative for neck stiffness.   Skin: Negative for wound.   Neurological: Negative for dizziness and weakness.   Hematological: Does not bruise/bleed easily.   Psychiatric/Behavioral: Negative for agitation and confusion    MEDICATIONS:    Scheduled Meds:  ALPRAZolam, 0.25 mg, Oral, TID  aluminum sulfate-calcium acetate 1:20, 1,000 mL, Topical, BID  ampicillin-sulbactam, 3 g, Intravenous, Q6H  diphenhydrAMINE, 25 mg, Intravenous, Once  folic acid, 1 mg, Oral, Daily  insulin lispro, 0-7 Units, Subcutaneous, TID AC  sodium chloride, 10 mL, Intravenous, Q12H  Zinc Oxide, , Apply externally, BID       Continuous Infusions:      PRN Meds:  •  acetaminophen **OR** acetaminophen **OR** acetaminophen  •  aluminum-magnesium hydroxide-simethicone  •  Calcium Gluconate-NaCl **AND** calcium gluconate **AND** Calcium, Ionized  •  dextrose  •  dextrose  •  glucagon (human recombinant)  •  HYDROmorphone  •  insulin lispro **AND** insulin lispro  •  Lidocaine HCl gel  •  magnesium sulfate **OR** magnesium sulfate **OR** magnesium sulfate  •  melatonin  •  ondansetron **OR** ondansetron  •  potassium & sodium phosphates **OR** potassium & sodium  "phosphates  •  potassium chloride **OR** potassium chloride **OR** potassium chloride  •  sodium chloride  •  sodium chloride     ALLERGIES:    Allergies   Allergen Reactions   • Meperidine Palpitations, Anxiety and Nausea And Vomiting     Other reaction(s): \"feel like I am going to pass out\", Feel like I am going to have panic attack, Irregular heart beat, Nausea         Objective    VITALS:   /75   Pulse 86   Temp 97.9 °F (36.6 °C)   Resp 18   Ht 157.5 cm (62\")   Wt 62.8 kg (138 lb 8 oz)   SpO2 95%   Breastfeeding No   BMI 25.33 kg/m²     PHYSICAL EXAM: (performed by MD)  Physical Exam  Constitutional:       General: She is not in acute distress.  HENT:      Head: Normocephalic and atraumatic.      Nose: Nose normal.      Mouth/Throat:      Mouth: Mucous membranes are moist.   Eyes:      General:         Right eye: No discharge.         Left eye: No discharge.      Extraocular Movements: Extraocular movements intact.      Conjunctiva/sclera: Conjunctivae normal.   Cardiovascular:      Rate and Rhythm: Normal rate and regular rhythm.      Pulses: Normal pulses.      Heart sounds: Normal heart sounds. No murmur heard.  No gallop.    Pulmonary:      Effort: Pulmonary effort is normal. No respiratory distress.      Breath sounds: Normal breath sounds. No wheezing or rhonchi.   Abdominal:      General: Bowel sounds are normal.      Palpations: Abdomen is soft.      Tenderness: There is abdominal tenderness.   Musculoskeletal:         General: Normal range of motion.      Cervical back: Normal range of motion and neck supple.      Right lower leg: No edema.      Left lower leg: No edema.   Skin:     General: Skin is warm and dry.      Capillary Refill: Capillary refill takes less than 2 seconds.      Comments: No obvious bleeding  Dressing right abdomen   Neurological:      Mental Status: She is alert and oriented to person, place, and time.   Psychiatric:         Mood and Affect: Mood normal.         " Behavior: Behavior normal.         Thought Content: Thought content normal.     Drainage tube noted  I have reexamined the patient and the results are consistent with the previously documented exam. Shital Pillai MD     RECENT LABS:  Lab Results (last 24 hours)     Procedure Component Value Units Date/Time    Wound Culture - Wound, Vagina [[id number]] Collected: 11/20/21 1211    Specimen: Wound from Vagina Updated: 11/20/21 1234    POC Glucose Once [[id number]]  (Abnormal) Collected: 11/20/21 1133    Specimen: Blood Updated: 11/20/21 1135     Glucose 174 mg/dL      Comment: Serial Number: [id number]:  [id number]       POC Glucose Once [[id number]]  (Abnormal) Collected: 11/20/21 0714    Specimen: Blood Updated: 11/20/21 0715     Glucose 162 mg/dL      Comment: Serial Number: [id number]:  [id number]       Manual Differential [[id number]]  (Abnormal) Collected: 11/20/21 0445    Specimen: Blood Updated: 11/20/21 0556     Neutrophil % 67.0 %      Lymphocyte % 10.0 %      Monocyte % 10.0 %      Bands %  13.0 %      Neutrophils Absolute 8.96 10*3/mm3      Lymphocytes Absolute 1.12 10*3/mm3      Monocytes Absolute 1.12 10*3/mm3      Anisocytosis Slight/1+     Microcytes Slight/1+     Poikilocytes Slight/1+     WBC Morphology Normal     Platelet Estimate Increased    CBC & Differential [[id number]]  (Abnormal) Collected: 11/20/21 0445    Specimen: Blood Updated: 11/20/21 0556    Narrative:      The following orders were created for panel order CBC & Differential.  Procedure                               Abnormality         Status                     ---------                               -----------         ------                     CBC Auto Differential[[id number]]        Abnormal            Final result               Scan Slide[[id number]]                                       Final result                 Please view results for these tests on the individual orders.    Scan Slide [[id number]] Collected:  11/20/21 0445    Specimen: Blood Updated: 11/20/21 0556     Scan Slide --     Comment: See Manual Differential Results       CBC Auto Differential [[id number]]  (Abnormal) Collected: 11/20/21 0445    Specimen: Blood Updated: 11/20/21 0556     WBC 11.20 10*3/mm3      RBC 3.74 10*6/mm3      Hemoglobin 8.9 g/dL      Hematocrit 27.8 %      MCV 74.3 fL      MCH 23.9 pg      MCHC 32.2 g/dL      RDW 17.1 %      RDW-SD 45.1 fl      MPV 6.6 fL      Platelets 535 10*3/mm3     Narrative:      The previously reported component NRBC is no longer being reported. Previous result was 0.0 /100 WBC (Reference Range: 0.0-0.2 /100 WBC) on 11/20/2021 at 0519 EST.    Basic Metabolic Panel [[id number]]  (Abnormal) Collected: 11/20/21 0445    Specimen: Blood Updated: 11/20/21 0527     Glucose 114 mg/dL      BUN 10 mg/dL      Creatinine 0.62 mg/dL      Sodium 135 mmol/L      Potassium 3.8 mmol/L      Chloride 93 mmol/L      CO2 35.0 mmol/L      Calcium 8.5 mg/dL      eGFR Non African Amer 96 mL/min/1.73      BUN/Creatinine Ratio 16.1     Anion Gap 7.0 mmol/L     Narrative:      GFR Normal >60  Chronic Kidney Disease <60  Kidney Failure <15      Magnesium [[id number]]  (Abnormal) Collected: 11/20/21 0445    Specimen: Blood Updated: 11/20/21 0527     Magnesium 1.5 mg/dL     Chylomicron Screen, Fluid - Body Fluid, [[id number]] Collected: 11/19/21 2133    Specimen: Body Fluid Updated: 11/19/21 2316    Potassium [[id number]]  (Normal) Collected: 11/19/21 1930    Specimen: Blood Updated: 11/19/21 2016     Potassium 4.2 mmol/L      Comment: Result checked        POC Glucose Once [[id number]]  (Abnormal) Collected: 11/19/21 1945    Specimen: Blood Updated: 11/19/21 1946     Glucose 205 mg/dL      Comment: Serial Number: [id number]:  [id number]       POC Glucose Once [[id number]]  (Abnormal) Collected: 11/19/21 1709    Specimen: Blood Updated: 11/19/21 1710     Glucose 148 mg/dL      Comment: Serial Number: [id number]:  [id number]       Lactate  Dehydrogenase [[id number]]  (Normal) Collected: 11/19/21 0401    Specimen: Blood Updated: 11/19/21 1630      U/L     Reticulocytes [[id number]]  (Abnormal) Collected: 11/19/21 0401    Specimen: Blood Updated: 11/19/21 1618     Reticulocyte % 2.22 %      Reticulocyte Absolute 0.0763 10*6/mm3     Iron Profile [[id number]]  (Abnormal) Collected: 11/19/21 0401    Specimen: Blood Updated: 11/19/21 1612     Iron 29 mcg/dL      Iron Saturation 20 %      Transferrin 98 mg/dL      TIBC 146 mcg/dL     Blood Culture - Blood, Arm, Right [[id number]]  (Normal) Collected: 11/18/21 1449    Specimen: Blood from Arm, Right Updated: 11/19/21 1502     Blood Culture No growth at 24 hours    Blood Culture - Blood, Arm, Right [[id number]]  (Normal) Collected: 11/18/21 1358    Specimen: Blood from Arm, Right Updated: 11/19/21 1402     Blood Culture No growth at 24 hours          PENDING RESULTS:     IMAGING REVIEWED:  CT Abdomen Pelvis With Contrast    Result Date: 11/18/2021  1.Soft tissue thickening with ulceration involving peritoneum, which could represent known valvular cancer. Recommend correlation with direct inspection. 2.Large 25 cm lobulated rim-enhancing collection along left abdomen and pelvis as described above, most concerning for an abscess. This would be amenable to percutaneous drainage. 3.Trace pelvic free fluid. 4.Hepatic steatosis.  Electronically Signed By-Marvin Medina MD On:11/18/2021 1:45 PM This report was finalized on [id number] by  Marvin Medina MD.    CT Guided Abscess Drain Peritoneal    Result Date: 11/18/2021  IMPRESSION : CT-guided abscess drain catheter for drainage of large left abdominal pelvic abscess collection, as described above.  Electronically Signed By-Shane Andrade MD On:11/18/2021 4:58 PM This report was finalized on [id number] by  Shane Andrade MD.      Assessment/Plan   ASSESSMENT:    Recurrent vulvar cancer: Stage Ib grade 2 SCC of the vulva.  S/p left radical Jorge vulvectomy,  and left inguinal LND on 4/18/2019.  Patient declined adjuvant radiation with weekly cisplatin.  New left-sided vulvar lesion recurrent disease 2/23/2020.  EUA, cystoscopy and vulvar biopsy 11/12/2020 reveals squamous cell carcinoma invasive, of vulva.  PET/CT 12/7/2020 shows recurrent vulvar squamous cell cancer at site of biopsy no regional or distant metastasis disease.  Keytruda started December 2020, development of pneumonitis 3/17/2021.  Personal decision made in August 2021 to forego aggressive treatment of her disease and focus on quality of life issues per Dr. Borja 9/22/21 note..  We will obtain the CT scan of the chest.  Patient is now thinking that she may want treatment for her malignancy     Large intra-abdominal abscess of left lower quadrant: CT 11/18/2021 reveals 25 cm lobulated rim-enhancing collection along the left abdomen and pelvis concerning for abscess.    Percutaneous drainage was performed 11/18/2021 per Dr. Carpenter.  Fluid growth reveals Streptococcus Agalactieae.    Infectious disease consulted.  Continue drainage tube  Final Blood cultures are pending     Leukocytosis: Related to abdominal abscess     Thrombocytosis: Platelet 535.  Monitor CBC daily     Anemia: Related to Malignancy, chronic infection, chronic illness.  Folate level 4.09.  Begin Folic acid 1 mg daily    Iron Deficiency Anemia: Iron level 29.  Saturation 20%. Iron levels low, would benefit from Ferrlecit 250 mg IV to aid in healing process after surgery     Hypokalemia: Potassium 3.0.  Replete per primary: Resolved, potassium 3.8 today     Urinary Tract Infection: Await urine culture    Other chronic conditions: DM type II, dermatitis associated with moisture, urinary tract infection,    PLAN:  1. Ferrlecit 250 mg IV: aid in healing process and correct iron deficiency  2. Await ID consult  3. Follow with general surgery  4. Await blood and urine cultures  5. Supportive care  6. Follow with Dr. Borja at [facility] upon  discharge      Electronically signed by MANISH Howell, 11/20/21, 7:21 AM EST.           Patient seen and examined.  Face-to-face evaluation completed.  Note reviewed and edited.  Discussed with nurse practitioner.  Agree with assessment and plan as documented above.  Continue IV antibiotics, drainage tube, folic acid.  Obtain a CT scan of the chest for cancer restaging  Discussed with patient    Electronically signed by Shital Pillai MD, 11/20/21, 12:44 PM EST.

## 2021-11-20 NOTE — PLAN OF CARE
Goal Outcome Evaluation:  Patient is pleasant. C/O pain throughout shift- tx with dilaudid, see MAR. Wound care performed today and wound swab obtained. CT of chest ordered today- results still pending. No further complaints. Will continue to monitor.

## 2021-11-21 VITALS
DIASTOLIC BLOOD PRESSURE: 61 MMHG | OXYGEN SATURATION: 96 % | SYSTOLIC BLOOD PRESSURE: 109 MMHG | BODY MASS INDEX: 25.56 KG/M2 | RESPIRATION RATE: 18 BRPM | WEIGHT: 138.89 LBS | HEIGHT: 62 IN | TEMPERATURE: 98.9 F | HEART RATE: 83 BPM

## 2021-11-21 PROBLEM — K65.1 INTRA-ABDOMINAL ABSCESS (HCC): Status: ACTIVE | Noted: 2021-11-18

## 2021-11-21 PROBLEM — F41.1 GAD (GENERALIZED ANXIETY DISORDER): Status: ACTIVE | Noted: 2021-11-21

## 2021-11-21 PROBLEM — N30.01 ACUTE CYSTITIS WITH HEMATURIA: Status: ACTIVE | Noted: 2021-11-21

## 2021-11-21 PROBLEM — G89.4 CHRONIC PAIN SYNDROME: Status: ACTIVE | Noted: 2021-11-21

## 2021-11-21 PROBLEM — R50.9 FEVER: Status: ACTIVE | Noted: 2021-11-21

## 2021-11-21 PROBLEM — Z85.44 HISTORY OF CANCER OF VULVA: Status: ACTIVE | Noted: 2021-11-21

## 2021-11-21 LAB
ANION GAP SERPL CALCULATED.3IONS-SCNC: 9 MMOL/L (ref 5–15)
ANISOCYTOSIS BLD QL: ABNORMAL
BLASTS NFR BLD MANUAL: 1 % (ref 0–0)
BUN SERPL-MCNC: 9 MG/DL (ref 8–23)
BUN/CREAT SERPL: 14.1 (ref 7–25)
CALCIUM SPEC-SCNC: 8.7 MG/DL (ref 8.6–10.5)
CHLORIDE SERPL-SCNC: 94 MMOL/L (ref 98–107)
CO2 SERPL-SCNC: 30 MMOL/L (ref 22–29)
CREAT SERPL-MCNC: 0.64 MG/DL (ref 0.57–1)
DEPRECATED RDW RBC AUTO: 45.1 FL (ref 37–54)
EOSINOPHIL # BLD MANUAL: 0.11 10*3/MM3 (ref 0–0.4)
EOSINOPHIL NFR BLD MANUAL: 1 % (ref 0.3–6.2)
ERYTHROCYTE [DISTWIDTH] IN BLOOD BY AUTOMATED COUNT: 17.2 % (ref 12.3–15.4)
GFR SERPL CREATININE-BSD FRML MDRD: 93 ML/MIN/1.73
GLUCOSE BLDC GLUCOMTR-MCNC: 164 MG/DL (ref 70–105)
GLUCOSE BLDC GLUCOMTR-MCNC: 196 MG/DL (ref 70–105)
GLUCOSE SERPL-MCNC: 117 MG/DL (ref 65–99)
HCT VFR BLD AUTO: 25.5 % (ref 34–46.6)
HGB BLD-MCNC: 8.3 G/DL (ref 12–15.9)
LARGE PLATELETS: ABNORMAL
LYMPHOCYTES # BLD MANUAL: 1.16 10*3/MM3 (ref 0.7–3.1)
LYMPHOCYTES NFR BLD MANUAL: 9 % (ref 5–12)
MAGNESIUM SERPL-MCNC: 1.4 MG/DL (ref 1.6–2.4)
MAGNESIUM SERPL-MCNC: 3.3 MG/DL (ref 1.6–2.4)
MCH RBC QN AUTO: 24.2 PG (ref 26.6–33)
MCHC RBC AUTO-ENTMCNC: 32.4 G/DL (ref 31.5–35.7)
MCV RBC AUTO: 74.5 FL (ref 79–97)
METAMYELOCYTES NFR BLD MANUAL: 1 % (ref 0–0)
MICROCYTES BLD QL: ABNORMAL
MONOCYTES # BLD: 0.95 10*3/MM3 (ref 0.1–0.9)
MYELOCYTES NFR BLD MANUAL: 3 % (ref 0–0)
NEUTROPHILS # BLD AUTO: 7.77 10*3/MM3 (ref 1.7–7)
NEUTROPHILS NFR BLD MANUAL: 72 % (ref 42.7–76)
NEUTS BAND NFR BLD MANUAL: 2 % (ref 0–5)
OVALOCYTES BLD QL SMEAR: ABNORMAL
PATHOLOGY REVIEW: YES
PLATELET # BLD AUTO: 443 10*3/MM3 (ref 140–450)
PMV BLD AUTO: 6.9 FL (ref 6–12)
POIKILOCYTOSIS BLD QL SMEAR: ABNORMAL
POLYCHROMASIA BLD QL SMEAR: ABNORMAL
POTASSIUM SERPL-SCNC: 3.8 MMOL/L (ref 3.5–5.2)
RBC # BLD AUTO: 3.43 10*6/MM3 (ref 3.77–5.28)
SCAN SLIDE: NORMAL
SMALL PLATELETS BLD QL SMEAR: ADEQUATE
SODIUM SERPL-SCNC: 133 MMOL/L (ref 136–145)
VARIANT LYMPHS NFR BLD MANUAL: 11 % (ref 19.6–45.3)
WBC MORPH BLD: NORMAL
WBC NRBC COR # BLD: 10.5 10*3/MM3 (ref 3.4–10.8)

## 2021-11-21 PROCEDURE — 83735 ASSAY OF MAGNESIUM: CPT | Performed by: INTERNAL MEDICINE

## 2021-11-21 PROCEDURE — 82962 GLUCOSE BLOOD TEST: CPT

## 2021-11-21 PROCEDURE — 99239 HOSP IP/OBS DSCHRG MGMT >30: CPT | Performed by: INTERNAL MEDICINE

## 2021-11-21 PROCEDURE — 99232 SBSQ HOSP IP/OBS MODERATE 35: CPT | Performed by: INTERNAL MEDICINE

## 2021-11-21 PROCEDURE — 36415 COLL VENOUS BLD VENIPUNCTURE: CPT | Performed by: NURSE PRACTITIONER

## 2021-11-21 PROCEDURE — 85007 BL SMEAR W/DIFF WBC COUNT: CPT | Performed by: NURSE PRACTITIONER

## 2021-11-21 PROCEDURE — 80048 BASIC METABOLIC PNL TOTAL CA: CPT | Performed by: NURSE PRACTITIONER

## 2021-11-21 PROCEDURE — 25010000002 MAGNESIUM SULFATE 2 GM/50ML SOLUTION: Performed by: NURSE PRACTITIONER

## 2021-11-21 PROCEDURE — 83735 ASSAY OF MAGNESIUM: CPT | Performed by: NURSE PRACTITIONER

## 2021-11-21 PROCEDURE — 63710000001 INSULIN LISPRO (HUMAN) PER 5 UNITS: Performed by: NURSE PRACTITIONER

## 2021-11-21 PROCEDURE — 85025 COMPLETE CBC W/AUTO DIFF WBC: CPT | Performed by: NURSE PRACTITIONER

## 2021-11-21 PROCEDURE — 99231 SBSQ HOSP IP/OBS SF/LOW 25: CPT | Performed by: STUDENT IN AN ORGANIZED HEALTH CARE EDUCATION/TRAINING PROGRAM

## 2021-11-21 PROCEDURE — 25010000002 HYDROMORPHONE PER 4 MG: Performed by: NURSE PRACTITIONER

## 2021-11-21 RX ORDER — INSULIN LISPRO 100 [IU]/ML
0-7 INJECTION, SOLUTION INTRAVENOUS; SUBCUTANEOUS
Qty: 3 ML | Refills: 12 | Status: ON HOLD | OUTPATIENT
Start: 2021-11-21 | End: 2021-12-04

## 2021-11-21 RX ORDER — ZINC OXIDE 16 %
OINTMENT (GRAM) TOPICAL 2 TIMES DAILY
Qty: 113 G | Refills: 2 | Status: ON HOLD | OUTPATIENT
Start: 2021-11-21 | End: 2021-12-04

## 2021-11-21 RX ORDER — ALPRAZOLAM 0.25 MG/1
0.25 TABLET ORAL 3 TIMES DAILY
Qty: 16 TABLET | Refills: 0 | Status: SHIPPED | OUTPATIENT
Start: 2021-11-21 | End: 2021-11-27

## 2021-11-21 RX ORDER — HYDROMORPHONE HYDROCHLORIDE 1 MG/ML
1 SOLUTION ORAL EVERY 6 HOURS PRN
Qty: 16 ML | Refills: 0 | Status: SHIPPED | OUTPATIENT
Start: 2021-11-21 | End: 2021-11-25

## 2021-11-21 RX ORDER — METRONIDAZOLE 500 MG/1
500 TABLET ORAL EVERY 8 HOURS SCHEDULED
Qty: 35 TABLET | Refills: 0 | Status: ON HOLD | OUTPATIENT
Start: 2021-11-21 | End: 2021-12-03

## 2021-11-21 RX ORDER — FOLIC ACID 1 MG/1
1 TABLET ORAL DAILY
Qty: 30 TABLET | Refills: 2 | Status: ON HOLD | OUTPATIENT
Start: 2021-11-22 | End: 2021-12-04

## 2021-11-21 RX ORDER — INSULIN LISPRO 100 [IU]/ML
0-7 INJECTION, SOLUTION INTRAVENOUS; SUBCUTANEOUS AS NEEDED
Qty: 3 ML | Refills: 12 | Status: SHIPPED | OUTPATIENT
Start: 2021-11-21 | End: 2021-12-03

## 2021-11-21 RX ADMIN — MAGNESIUM SULFATE HEPTAHYDRATE 2 G: 40 INJECTION, SOLUTION INTRAVENOUS at 13:31

## 2021-11-21 RX ADMIN — Medication: at 08:38

## 2021-11-21 RX ADMIN — ALPRAZOLAM 0.25 MG: 0.25 TABLET ORAL at 08:37

## 2021-11-21 RX ADMIN — HYDROMORPHONE HYDROCHLORIDE 1 MG: 2 INJECTION, SOLUTION INTRAMUSCULAR; INTRAVENOUS; SUBCUTANEOUS at 02:42

## 2021-11-21 RX ADMIN — HYDROMORPHONE HYDROCHLORIDE 1 MG: 2 INJECTION, SOLUTION INTRAMUSCULAR; INTRAVENOUS; SUBCUTANEOUS at 13:49

## 2021-11-21 RX ADMIN — MAGNESIUM SULFATE HEPTAHYDRATE 2 G: 40 INJECTION, SOLUTION INTRAVENOUS at 08:38

## 2021-11-21 RX ADMIN — METRONIDAZOLE 500 MG: 500 TABLET ORAL at 13:31

## 2021-11-21 RX ADMIN — FOLIC ACID 1 MG: 1 TABLET ORAL at 08:37

## 2021-11-21 RX ADMIN — Medication 10 ML: at 02:42

## 2021-11-21 RX ADMIN — CALCIUM ACETATE MONOHYDRATE AND ALUMINUM SULFATE TETRADECAHYDRATE 1000 ML: 952; 1347 POWDER, FOR SOLUTION TOPICAL at 08:38

## 2021-11-21 RX ADMIN — MAGNESIUM SULFATE HEPTAHYDRATE 2 G: 40 INJECTION, SOLUTION INTRAVENOUS at 11:35

## 2021-11-21 RX ADMIN — HYDROMORPHONE HYDROCHLORIDE 1 MG: 2 INJECTION, SOLUTION INTRAMUSCULAR; INTRAVENOUS; SUBCUTANEOUS at 08:38

## 2021-11-21 RX ADMIN — SODIUM CHLORIDE, PRESERVATIVE FREE 10 ML: 5 INJECTION INTRAVENOUS at 08:38

## 2021-11-21 RX ADMIN — METRONIDAZOLE 500 MG: 500 TABLET ORAL at 05:34

## 2021-11-21 RX ADMIN — INSULIN LISPRO 2 UNITS: 100 INJECTION, SOLUTION INTRAVENOUS; SUBCUTANEOUS at 11:35

## 2021-11-21 RX ADMIN — ACETAMINOPHEN 650 MG: 325 TABLET, FILM COATED ORAL at 08:51

## 2021-11-21 RX ADMIN — INSULIN LISPRO 2 UNITS: 100 INJECTION, SOLUTION INTRAVENOUS; SUBCUTANEOUS at 08:37

## 2021-11-21 NOTE — DISCHARGE SUMMARY
[facility]  DISCHARGE SUMMARY    Patient Name: Carmella Pelayo  : 1955  MRN: [id number]    Date of Admission: 2021  Date of Discharge: 2021  Primary Care Physician: Denaa Wallace MD      Presenting Problem:   Intra-abdominal abscess (HCC) [K65.1]  History of cancer of vulva [Z85.44]  Acute cystitis with hematuria [N30.01]  Fever, unspecified fever cause [R50.9]  Left lower quadrant abdominal pain [R10.32]    Active and Resolved Hospital Problems:  Active Hospital Problems    Diagnosis POA   • **Intra-abdominal abscess (HCC) [K65.1] Yes     Priority: High   • Acute UTI (urinary tract infection) [N39.0] Yes     Priority: High   • Chronic pain syndrome [G89.4] Yes     Priority: Low   • HEBER (generalized anxiety disorder) [F41.1] Yes     Priority: Low   • Fever [R50.9] Yes   • Acute cystitis with hematuria [N30.01] Yes   • History of cancer of vulva [Z85.44] Not Applicable   • Dermatitis associated with moisture [L30.8] Yes   • Malignant neoplasm of vulva (HCC) [C51.9] Yes   • Thrombocytosis [D75.839] Yes   • Left lower quadrant abdominal pain [R10.32] Yes   • Anemia [D64.9] Yes   • Recurrent malignant neoplasm (HCC) [C80.1] Yes   • Type 2 diabetes mellitus (HCC) [E11.9] Yes      Resolved Hospital Problems   No resolved problems to display.         Hospital Course     Hospital Course:  Patient is a 66 female with past medical history of IV drug use, diabetes mellitus type 2 and  vulvar cancer who presented to the emergency room because of fever and chills and abdominal pain.  Patient was seen in the emergency room and was diagnosed with abdominal wall abscess.  The abscess was drained and patient was started on antibiotics.  General surgery consult was completed.  Hematology and infectious disease consults were completed.  Acute UTI was treated with antibiotics.  Abdominal wall abscess/large left lower quadrant abscess was treated with  antibiotics.  Abscess was drained and 400 mm of purulent drainage was removed.  The culture was positive for group B streptococcus.  Chronic perirectal wounds were treated with the wound care.  Diabetes mellitus type 2 was treated with insulin therapy.  Appropriate patient's home medications were resumed in the hospital for other chronic medical conditions.  Patient reported significant improvement in her symptoms after over 72 hours in the hospital and requested to be discharged to rehab facility.  Patient was advised to take her medications as prescribed.  Discharge medications are as per medication reconciliation list.  Patient was advised to follow-up with her primary care physician within 3 to 5 days of discharge.  Patient was advised to follow-up with her hematology oncologist within 14 days of discharge.  Patient was advised to return to the emergency department if she experiences any recurrence of her symptoms.  Patient and family agreed with the plan and patient was discharged in a stable condition.    DISCHARGE Follow Up Recommendations for labs and diagnostics:     Patient was advised to follow-up with her primary care physician who will review her current medications.    Patient was advised to follow-up with her oncologist who will reassess her hematologic and oncologic status with reference of recurrence of her malignancy.      Reasons For Change In Medications and Indications for New Medications:      Day of Discharge     Vital Signs:  Temp:  [97.9 °F (36.6 °C)-99 °F (37.2 °C)] 99 °F (37.2 °C)  Heart Rate:  [83-92] 92  Resp:  [17-18] 18  BP: (109-127)/(64-78) 127/78    Physical Exam:  Physical Exam  Vitals and nursing note reviewed.   Constitutional:       General: She is not in acute distress.  HENT:      Head: Normocephalic and atraumatic.      Nose: Nose normal. No congestion or rhinorrhea.      Mouth/Throat:      Mouth: Mucous membranes are moist.      Pharynx: Oropharynx is clear. No oropharyngeal  exudate or posterior oropharyngeal erythema.   Eyes:      Pupils: Pupils are equal, round, and reactive to light.   Cardiovascular:      Pulses: Normal pulses.      Heart sounds: Normal heart sounds. No murmur heard.  No friction rub. No gallop.       Comments: Swollen S2 present.  No tachycardia.  Pulmonary:      Effort: No respiratory distress.      Breath sounds: No wheezing, rhonchi or rales.   Chest:      Chest wall: No tenderness.   Abdominal:      General: Abdomen is flat. Bowel sounds are normal. There is no distension.      Palpations: Abdomen is soft.      Tenderness: There is no right CVA tenderness.   Musculoskeletal:         General: No swelling, tenderness, deformity or signs of injury.      Cervical back: Neck supple. No tenderness.      Right lower leg: No edema.      Left lower leg: No edema.   Skin:     Capillary Refill: Capillary refill takes less than 2 seconds.      Coloration: Skin is not jaundiced.      Findings: No bruising, lesion or rash.   Neurological:      Mental Status: She is alert.      Comments: No facial asymmetry noted.  Gait and station not tested.   Psychiatric:      Comments: No agitation.              Pertinent  and/or Most Recent Results     LAB RESULTS:      Lab 11/21/21  0534 11/20/21  0445 11/19/21  0401 11/18/21  1208 11/18/21  1204   WBC 10.50 11.20* 15.30*  --  19.50*   HEMOGLOBIN 8.3* 8.9* 8.2*  --  8.9*   HEMATOCRIT 25.5* 27.8* 25.8*  --  28.4*   PLATELETS 443 535* 469*  --  517*   NEUTROS ABS 7.77* 8.96* 12.30*  --  16.90*   LYMPHS ABS  --   --  1.60  --  1.10   MONOS ABS  --   --  1.30*  --  1.40*   EOS ABS 0.11  --  0.10  --  0.00   MCV 74.5* 74.3* 74.4*  --  73.9*   LACTATE  --   --   --  1.2  --    LDH  --   --  183  --   --          Lab 11/21/21  0534 11/20/21  0445 11/19/21  1930 11/19/21  0401 11/18/21  1204   SODIUM 133* 135*  --  135* 134*   POTASSIUM 3.8 3.8 4.2 3.0* 3.5   CHLORIDE 94* 93*  --  92* 88*   CO2 30.0* 35.0*  --  34.0* 35.0*   ANION GAP 9.0 7.0   --  9.0 11.0   BUN 9 10  --  14 15   CREATININE 0.64 0.62  --  0.67 0.72   GLUCOSE 117* 114*  --  146* 191*   CALCIUM 8.7 8.5*  --  8.5* 8.5*   MAGNESIUM 1.4* 1.5*  --  1.7 1.6   HEMOGLOBIN A1C  --   --   --   --  10.0*         Lab 11/18/21  1204   TOTAL PROTEIN 7.3   ALBUMIN 2.00*   GLOBULIN 5.3   ALT (SGPT) <5   AST (SGOT) 9   BILIRUBIN 0.5   ALK PHOS 175*   LIPASE 8*                 Lab 11/19/21  0401 11/18/21  1204 11/18/21  1204   IRON 29*   < > 16*   IRON SATURATION 20  --   --    TIBC 146*  --   --    TRANSFERRIN 98*  --   --    FERRITIN  --   --  555.60*   FOLATE  --   --  4.09*   VITAMIN B 12  --   --  319    < > = values in this interval not displayed.         Brief Urine Lab Results  (Last result in the past 365 days)      Color   Clarity   Blood   Leuk Est   Nitrite   Protein   CREAT   Urine HCG        11/18/21 1305 Dark Yellow   Turbid  Comment:  Result checked    Large (3+)   Large (3+)   Negative   100 mg/dL (2+)               Microbiology Results (last 10 days)     Procedure Component Value - Date/Time    Wound Culture - Wound, Vagina [[id number]] Collected: 11/20/21 1211    Lab Status: Preliminary result Specimen: Wound from Vagina Updated: 11/21/21 1053     Wound Culture Culture in progress     Gram Stain Many (4+) WBCs per low power field      Rare (1+) Gram negative bacilli    Body Fluid Culture - Body Fluid, Peritoneum [[id number]]  (Abnormal) Collected: 11/18/21 1611    Lab Status: Final result Specimen: Body Fluid from Peritoneum Updated: 11/19/21 1154     Body Fluid Culture Heavy growth (4+) Streptococcus agalactiae (Group B)     Comment: This organism is considered to be universally susceptible to penicillin.  No further antibiotic testing will be performed.        Gram Stain Many (4+) WBCs seen      Moderate (3+) Gram positive cocci in chains    COVID PRE-OP / PRE-PROCEDURE SCREENING ORDER (NO ISOLATION) - Swab, Nasopharynx [[id number]]  (Normal) Collected: 11/18/21 1518    Lab Status: Final  result Specimen: Swab from Nasopharynx Updated: 11/18/21 1549    Narrative:      The following orders were created for panel order COVID PRE-OP / PRE-PROCEDURE SCREENING ORDER (NO ISOLATION) - Swab, Nasopharynx.  Procedure                               Abnormality         Status                     ---------                               -----------         ------                     COVID-19,CEPHEID/ISELA/BD...[[id number]]  Normal              Final result                 Please view results for these tests on the individual orders.    COVID-19,CEPHEID/ISELA/BDMAX,COR/TUNDE/PAD/KIMO IN-HOUSE(OR EMERGENT/ADD-ON),NP SWAB IN TRANSPORT MEDIA 3-4 HR TAT, RT-PCR - Swab, Nasopharynx [[id number]]  (Normal) Collected: 11/18/21 1518    Lab Status: Final result Specimen: Swab from Nasopharynx Updated: 11/18/21 1549     COVID19 Not Detected    Narrative:      Fact sheet for providers: https://www.fda.gov/media/243838/download     Fact sheet for patients: https://www.fda.gov/media/533699/download  Fact sheet for providers: https://www.fda.gov/media/395712/download    Fact sheet for patients: https://www.fda.gov/media/433278/download    Test performed by PCR.    Blood Culture - Blood, Arm, Right [[id number]]  (Normal) Collected: 11/18/21 1449    Lab Status: Preliminary result Specimen: Blood from Arm, Right Updated: 11/20/21 1502     Blood Culture No growth at 2 days    Blood Culture - Blood, Arm, Right [[id number]]  (Normal) Collected: 11/18/21 1358    Lab Status: Preliminary result Specimen: Blood from Arm, Right Updated: 11/20/21 1402     Blood Culture No growth at 2 days    Urine Culture - Urine, Urine, Clean Catch [[id number]] Collected: 11/18/21 1305    Lab Status: Final result Specimen: Urine, Clean Catch Updated: 11/20/21 1328     Urine Culture >100,000 CFU/mL Mixed Juwan Isolated    Narrative:      Specimen contains mixed organisms of questionable pathogenicity which indicates contamination with commensal juwan.  Further  identification is unlikely to provide clinically useful information.  Suggest recollection.          CT Chest With Contrast Diagnostic    Result Date: 11/20/2021  Impression: Probable partially calcified remnants of prior granulomatous disease in the anterior left upper lobe with no convincing evidence of malignant/metastatic disease in the chest. No acute intrathoracic abnormality.  Electronically Signed By-Anson Garber MD On:11/20/2021 8:10 PM This report was finalized on [id number] by  Anson Garber MD.    CT Abdomen Pelvis With Contrast    Result Date: 11/18/2021  Impression: 1.Soft tissue thickening with ulceration involving peritoneum, which could represent known valvular cancer. Recommend correlation with direct inspection. 2.Large 25 cm lobulated rim-enhancing collection along left abdomen and pelvis as described above, most concerning for an abscess. This would be amenable to percutaneous drainage. 3.Trace pelvic free fluid. 4.Hepatic steatosis.  Electronically Signed By-Marvin Medina MD On:11/18/2021 1:45 PM This report was finalized on [id number] by  Marvin Medina MD.    CT Guided Abscess Drain Peritoneal    Result Date: 11/18/2021  Impression: IMPRESSION : CT-guided abscess drain catheter for drainage of large left abdominal pelvic abscess collection, as described above.  Electronically Signed By-Shane Andrade MD On:11/18/2021 4:58 PM This report was finalized on [id number] by  Shane Andrade MD.                  Labs Pending at Discharge:  Pending Labs     Order Current Status    Pathology Consultation Collected (11/21/21 2493)    Chylomicron Screen, Fluid - Body Fluid, In process    Blood Culture - Blood, Arm, Right Preliminary result    Blood Culture - Blood, Arm, Right Preliminary result    Wound Culture - Wound, Vagina Preliminary result          Procedures Performed           Consults:   Consults     Date and Time Order Name Status Description    11/19/2021  2:40 PM Inpatient  "Infectious Diseases Consult Completed     11/19/2021 11:07 AM Hematology & Oncology Inpatient Consult      11/18/2021  2:28 PM Hospitalist (on-call MD unless specified)      11/18/2021  2:02 PM Surgery (on-call MD unless specified) Completed             Discharge Details        Discharge Medications      New Medications      Instructions Start Date   ALPRAZolam 0.25 MG tablet  Commonly known as: XANAX   0.25 mg, Oral, 3 Times Daily      Boudreauxs Butt Paste 16 % ointment  Generic drug: Zinc Oxide   Apply externally, 2 Times Daily      cefTRIAXone 2 g in sodium chloride 0.9 % 100 mL IVPB   2 g, Intravenous, Every 24 Hours      folic acid 1 MG tablet  Commonly known as: FOLVITE   1 mg, Oral, Daily   Start Date: November 22, 2021     insulin lispro 100 UNIT/ML injection  Commonly known as: ADMELOG   0-7 Units, Subcutaneous, 3 Times Daily Before Meals      insulin lispro 100 UNIT/ML injection  Commonly known as: ADMELOG   0-7 Units, Subcutaneous, As Needed      metroNIDAZOLE 500 MG tablet  Commonly known as: FLAGYL   500 mg, Oral, Every 8 Hours Scheduled         Changes to Medications      Instructions Start Date   HYDROmorphone 1 MG/ML liquid liquid  Commonly known as: DILAUDID  What changed:   · when to take this  · reasons to take this   1 mg, Oral, Every 6 Hours PRN         Continue These Medications      Instructions Start Date   ibuprofen 800 MG tablet  Commonly known as: ADVIL,MOTRIN   800 mg, Oral, Every 8 Hours PRN             Allergies   Allergen Reactions   • Meperidine Palpitations, Anxiety and Nausea And Vomiting     Other reaction(s): \"feel like I am going to pass out\", Feel like I am going to have panic attack, Irregular heart beat, Nausea           Discharge Disposition: Stable.  Rehab Facility or Unit (DC - External)    Diet:   Hospital:  Diet Order   Procedures   • Diet Diabetic/Consistent Carbs; Diabetic - Consistent Carb         Discharge Activity: As tolerated.  Activity Instructions    Resume " activity as tolerated.              CODE STATUS:  Code Status and Medical Interventions:   Ordered at: 11/18/21 1507     Level Of Support Discussed With:    Patient     Code Status (Patient has no pulse and is not breathing):    CPR (Attempt to Resuscitate)     Medical Interventions (Patient has pulse or is breathing):    Full Support         No future appointments.        Time spent on Discharge including face to face service: 40  minutes    This patient has been examined wearing appropriate Personal Protective Equipment and discussed with hospital infection control department, [facility], infectious disease specialist and pulmonologist. 11/21/21      Signature: Electronically signed by Joe Angeles MD, FACP, 11/21/21, 11:32 AM EST.

## 2021-11-21 NOTE — PROGRESS NOTES
Infectious Diseases Progress Note      LOS: 3 days   Patient Care Team:  Deana Wallace MD as PCP - General (Family Medicine)    Chief Complaint: Abdominal pain, fever, chills at admission    Subjective       The patient has been afebrile for the last 24 hours.  The patient is on room air, hemodynamically stable, and is tolerating antimicrobial therapy.        Review of Systems:   Review of Systems   Constitutional: Negative.    HENT: Negative.    Eyes: Negative.    Respiratory: Negative.    Cardiovascular: Negative.    Gastrointestinal: Positive for abdominal pain.   Endocrine: Negative.    Genitourinary: Negative.    Musculoskeletal: Negative.    Skin: Positive for wound.   Neurological: Negative.    Psychiatric/Behavioral: Negative.    All other systems reviewed and are negative.       Objective     Vital Signs  Temp:  [97.9 °F (36.6 °C)-99 °F (37.2 °C)] 98.9 °F (37.2 °C)  Heart Rate:  [83-92] 83  Resp:  [17-18] 18  BP: (109-127)/(61-78) 109/61    Physical Exam:  Physical Exam  Vitals and nursing note reviewed.   Constitutional:       General: She is not in acute distress.     Appearance: Normal appearance. She is well-developed and normal weight. She is not diaphoretic.   HENT:      Head: Normocephalic and atraumatic.   Eyes:      General: No scleral icterus.     Extraocular Movements: Extraocular movements intact.      Conjunctiva/sclera: Conjunctivae normal.      Pupils: Pupils are equal, round, and reactive to light.   Cardiovascular:      Rate and Rhythm: Normal rate and regular rhythm.      Heart sounds: Normal heart sounds, S1 normal and S2 normal. No murmur heard.      Pulmonary:      Effort: Pulmonary effort is normal. No respiratory distress.      Breath sounds: Normal breath sounds. No stridor. No wheezing or rales.   Chest:      Chest wall: No tenderness.   Abdominal:      General: Bowel sounds are normal. There is no distension.      Palpations: Abdomen is soft. There is no mass.       Tenderness: There is abdominal tenderness. There is no guarding.      Comments: Drain with purulent drainage   Musculoskeletal:         General: No swelling, tenderness or deformity. Normal range of motion.      Cervical back: Neck supple.   Skin:     General: Skin is warm and dry.      Coloration: Skin is not pale.      Findings: No bruising, erythema or rash.      Comments: Reported perirectal wounds   Neurological:      General: No focal deficit present.      Mental Status: She is alert and oriented to person, place, and time. Mental status is at baseline.      Cranial Nerves: No cranial nerve deficit.   Psychiatric:         Mood and Affect: Mood normal.          Results Review:    I have reviewed all clinical data, test, lab, and imaging results.     Radiology  CT Chest With Contrast Diagnostic    Result Date: 11/20/2021   DATE OF EXAM: 11/20/2021 5:42 PM  PROCEDURE: CT CHEST W CONTRAST DIAGNOSTIC-  INDICATIONS: Both cancer. Restaging and subsequent treatment planning.  COMPARISON: CT abdomen pelvis 11/19/2021. CT guided drainage catheter placement 11/18/2021.  TECHNIQUE: Routine transaxial slices were obtained through the chest after the intravenous administration of 100 mL of Isovue 370. Reconstructed coronal and sagittal images were also obtained. Automated exposure control and iterative construction methods were used.  FINDINGS: Mild multifocal bibasilar subsegmental atelectasis and/or scarring. A 7 mm subpleural nodule in the anterior right upper lobe demonstrates central calcification and some adjacent sub-4 mm nodularity and scarring, likely partially calcified remnants of prior granulomatous disease. Lungs otherwise clear. No concerning pulmonary nodule. The central airways are widely patent. No pneumothorax or pleural effusion.  The heart and great vessels of the chest are normal in size. Trace pericardial fluid. No pathologically enlarged intrathoracic lymph nodes are identified.  Limited imaging of  the upper abdomen partially includes lateral lower left chest wall and retroperitoneal fat stranding. Diffusely decreased hepatic attenuation, likely reflecting hepatic steatosis, with a partially imaged subcentimeter high density nodule in the inferior right lobe that could represent a flash filling hemangioma.  Moderate lower cervical spondylosis. Mild thoracic spondylosis. Degenerative changes in both shoulders. No acute osseous abnormality or concerning osseous lesion.      Probable partially calcified remnants of prior granulomatous disease in the anterior left upper lobe with no convincing evidence of malignant/metastatic disease in the chest. No acute intrathoracic abnormality.  Electronically Signed By-Anson Garber MD On:11/20/2021 8:10 PM This report was finalized on [id number] by  Anson Garber MD.      Cardiology    Laboratory    Results from last 7 days   Lab Units 11/21/21  0534 11/20/21 0445 11/19/21 0401 11/18/21  1204   WBC 10*3/mm3 10.50 11.20* 15.30* 19.50*   HEMOGLOBIN g/dL 8.3* 8.9* 8.2* 8.9*   HEMATOCRIT % 25.5* 27.8* 25.8* 28.4*   PLATELETS 10*3/mm3 443 535* 469* 517*     Results from last 7 days   Lab Units 11/21/21  0534 11/20/21 0445 11/19/21 1930 11/19/21 0401 11/18/21  1204   SODIUM mmol/L 133* 135*  --  135* 134*   POTASSIUM mmol/L 3.8 3.8 4.2 3.0* 3.5   CHLORIDE mmol/L 94* 93*  --  92* 88*   CO2 mmol/L 30.0* 35.0*  --  34.0* 35.0*   BUN mg/dL 9 10  --  14 15   CREATININE mg/dL 0.64 0.62  --  0.67 0.72   GLUCOSE mg/dL 117* 114*  --  146* 191*   ALBUMIN g/dL  --   --   --   --  2.00*   BILIRUBIN mg/dL  --   --   --   --  0.5   ALK PHOS U/L  --   --   --   --  175*   AST (SGOT) U/L  --   --   --   --  9   ALT (SGPT) U/L  --   --   --   --  <5   LIPASE U/L  --   --   --   --  8*   CALCIUM mg/dL 8.7 8.5*  --  8.5* 8.5*                 Microbiology   Microbiology Results (last 10 days)     Procedure Component Value - Date/Time    Wound Culture - Wound, Vagina [[id number]] Collected:  11/20/21 1211    Lab Status: Preliminary result Specimen: Wound from Vagina Updated: 11/21/21 1053     Wound Culture Culture in progress     Gram Stain Many (4+) WBCs per low power field      Rare (1+) Gram negative bacilli    Body Fluid Culture - Body Fluid, Peritoneum [[id number]]  (Abnormal) Collected: 11/18/21 1611    Lab Status: Final result Specimen: Body Fluid from Peritoneum Updated: 11/19/21 1154     Body Fluid Culture Heavy growth (4+) Streptococcus agalactiae (Group B)     Comment: This organism is considered to be universally susceptible to penicillin.  No further antibiotic testing will be performed.        Gram Stain Many (4+) WBCs seen      Moderate (3+) Gram positive cocci in chains    COVID PRE-OP / PRE-PROCEDURE SCREENING ORDER (NO ISOLATION) - Swab, Nasopharynx [[id number]]  (Normal) Collected: 11/18/21 1518    Lab Status: Final result Specimen: Swab from Nasopharynx Updated: 11/18/21 1549    Narrative:      The following orders were created for panel order COVID PRE-OP / PRE-PROCEDURE SCREENING ORDER (NO ISOLATION) - Swab, Nasopharynx.  Procedure                               Abnormality         Status                     ---------                               -----------         ------                     COVID-19,CEPHEID/ISELA/BD...[[id number]]  Normal              Final result                 Please view results for these tests on the individual orders.    COVID-19,CEPHEID/ISELA/BDMAX,COR/TUNDE/PAD/KIMO IN-HOUSE(OR EMERGENT/ADD-ON),NP SWAB IN TRANSPORT MEDIA 3-4 HR TAT, RT-PCR - Swab, Nasopharynx [[id number]]  (Normal) Collected: 11/18/21 1518    Lab Status: Final result Specimen: Swab from Nasopharynx Updated: 11/18/21 1549     COVID19 Not Detected    Narrative:      Fact sheet for providers: https://www.fda.gov/media/342521/download     Fact sheet for patients: https://www.fda.gov/media/272140/download  Fact sheet for providers: https://www.fda.gov/media/366889/download    Fact sheet for patients:  https://www.fda.gov/media/759764/download    Test performed by PCR.    Blood Culture - Blood, Arm, Right [[id number]]  (Normal) Collected: 11/18/21 1449    Lab Status: Preliminary result Specimen: Blood from Arm, Right Updated: 11/21/21 1500     Blood Culture No growth at 3 days    Blood Culture - Blood, Arm, Right [[id number]]  (Normal) Collected: 11/18/21 1358    Lab Status: Preliminary result Specimen: Blood from Arm, Right Updated: 11/21/21 1400     Blood Culture No growth at 3 days    Urine Culture - Urine, Urine, Clean Catch [[id number]] Collected: 11/18/21 1305    Lab Status: Final result Specimen: Urine, Clean Catch Updated: 11/20/21 1328     Urine Culture >100,000 CFU/mL Mixed Juwan Isolated    Narrative:      Specimen contains mixed organisms of questionable pathogenicity which indicates contamination with commensal juwan.  Further identification is unlikely to provide clinically useful information.  Suggest recollection.          Medication Review:       Schedule Meds  ALPRAZolam, 0.25 mg, Oral, TID  aluminum sulfate-calcium acetate 1:20, 1,000 mL, Topical, BID  cefTRIAXone, 2 g, Intravenous, Q24H  diphenhydrAMINE, 25 mg, Intravenous, Once  folic acid, 1 mg, Oral, Daily  insulin lispro, 0-7 Units, Subcutaneous, TID AC  metroNIDAZOLE, 500 mg, Oral, Q8H  sodium chloride, 10 mL, Intravenous, Q12H  Zinc Oxide, , Apply externally, BID        Infusion Meds       PRN Meds  •  acetaminophen **OR** acetaminophen **OR** acetaminophen  •  aluminum-magnesium hydroxide-simethicone  •  Calcium Gluconate-NaCl **AND** calcium gluconate **AND** Calcium, Ionized  •  dextrose  •  dextrose  •  glucagon (human recombinant)  •  HYDROmorphone  •  insulin lispro **AND** insulin lispro  •  Lidocaine HCl gel  •  magnesium sulfate **OR** magnesium sulfate **OR** magnesium sulfate  •  melatonin  •  ondansetron **OR** ondansetron  •  potassium & sodium phosphates **OR** potassium & sodium phosphates  •  potassium chloride **OR**  potassium chloride **OR** potassium chloride  •  sodium chloride  •  sodium chloride        Assessment/Plan       Antimicrobial Therapy   1.         2.        3.        4.        5.            Assessment     Large left lower quadrant abscess.  Patient came in with a history of low-grade fever and chills along with abdominal pain.  Abscess was drained on 11/18/2021 and 400 mL of purulent drainage was removed.  Cultures are showing heavy growth of group B streptococcus.  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  However patient does have some chronic perirectal wounds     Chronic perirectal wounds from previous vulvectomy surgery in 2019 along with moisture associated dermatitis.  Patient would not allow me to examine her at this time but I reviewed the wound care notes it appears that there is fairly significant denuded areas with some drainage.  -Wound cultures pending     Type 2 diabetes-uncontrolled patient's A1c is 10     History of vulvar cancer with a vulvectomy and radiation and chemotherapy treatments approximately 2 years ago.  Patient has had no recent treatments and-she reports that the cancer has returned and she is current with hospice at home  -Patient still has a port     Possible UTI.  Urinalysis showed some bacteria and pyuria however this is a clean-catch and with her wounds, this could be contamination     Mild leukocytosis-likely related to abscess  -Normalized           Plan     Continue IV Rocephin 2 g every 24 hours for 11 days for 14 days treatment  Continue p.o. Flagyl 500 mg 3 times daily for 11 days for 14 days treatment  Patient has a port  Weekly labs getting CBC and creatinine x2 weeks  Continue supportive care  Okay to discharge from Infectious Disease standpoint  Patient is currently on hospice but wants treatment for the abdominal abscess     Please fax all post discharge lab results, imaging studies and correspondence to this fax number [phone]  [phone]  For any question or concern please contact our service number [phone]    Faisal Clark MD  11/21/21  16:44 EST    Note is dictated utilizing voice recognition software/Dragon

## 2021-11-21 NOTE — NURSING NOTE
Performed wound care on perianal area. Administered Dilaudid prior to beginning. Cleansed with dombrero soaks then applied Bouderaux's Butt paste to the area.

## 2021-11-21 NOTE — PROGRESS NOTES
General Surgery Progress Note    Name: Carmella Pelayo ADMIT: 2021   : 1955  PCP: Deana Wallace MD    MRN: [id number] LOS: 3 days   AGE/SEX: 66 y.o. female  ROOM: [address] [place] [address]    Chief Complaint   Patient presents with   • Abdominal Pain     Subjective     Afebrile, wants to go back to her home, having bowel movements, drain actually serous today    Objective     Scheduled Medications:   ALPRAZolam, 0.25 mg, Oral, TID  aluminum sulfate-calcium acetate 1:20, 1,000 mL, Topical, BID  cefTRIAXone, 2 g, Intravenous, Q24H  diphenhydrAMINE, 25 mg, Intravenous, Once  folic acid, 1 mg, Oral, Daily  insulin lispro, 0-7 Units, Subcutaneous, TID AC  metroNIDAZOLE, 500 mg, Oral, Q8H  sodium chloride, 10 mL, Intravenous, Q12H  Zinc Oxide, , Apply externally, BID        Active Infusions:       As Needed Medications:  •  acetaminophen **OR** acetaminophen **OR** acetaminophen  •  aluminum-magnesium hydroxide-simethicone  •  Calcium Gluconate-NaCl **AND** calcium gluconate **AND** Calcium, Ionized  •  dextrose  •  dextrose  •  glucagon (human recombinant)  •  HYDROmorphone  •  insulin lispro **AND** insulin lispro  •  Lidocaine HCl gel  •  magnesium sulfate **OR** magnesium sulfate **OR** magnesium sulfate  •  melatonin  •  ondansetron **OR** ondansetron  •  potassium & sodium phosphates **OR** potassium & sodium phosphates  •  potassium chloride **OR** potassium chloride **OR** potassium chloride  •  sodium chloride  •  sodium chloride    Vital Signs  Vital Signs Patient Vitals for the past 24 hrs:   BP Temp Temp src Pulse Resp SpO2 Weight   21 1126 109/61 98.9 °F (37.2 °C) -- 83 18 96 % --   21 0836 127/78 99 °F (37.2 °C) Oral 92 18 95 % --   21 0416 117/74 97.9 °F (36.6 °C) Oral 83 17 92 % 63 kg (138 lb 14.2 oz)   21 0055 115/75 98.1 °F (36.7 °C) Oral 88 17 96 % --   21 109/64 98.2 °F (36.8 °C) Oral 89 18 96 % --     I/O:  I/O last 3 completed shifts:  In: 900  [P.O.:900]  Out: 215 [Drains:215]    Physical Exam:  Physical Exam  Constitutional:       General: She is not in acute distress.     Appearance: Normal appearance. She is not ill-appearing.   HENT:      Head: Normocephalic and atraumatic.      Right Ear: External ear normal.      Left Ear: External ear normal.   Eyes:      Extraocular Movements: Extraocular movements intact.      Conjunctiva/sclera: Conjunctivae normal.   Cardiovascular:      Rate and Rhythm: Normal rate and regular rhythm.   Pulmonary:      Effort: Pulmonary effort is normal. No respiratory distress.   Abdominal:      General: There is no distension.      Palpations: Abdomen is soft.      Tenderness: There is no abdominal tenderness.      Comments: Drain serous   Musculoskeletal:         General: No swelling or deformity.   Skin:     General: Skin is warm and dry.   Neurological:      Mental Status: She is alert and oriented to person, place, and time. Mental status is at baseline.         Results Review:     CBC    Results from last 7 days   Lab Units 11/21/21  0534 11/20/21  0445 11/19/21  0401 11/18/21  1204   WBC 10*3/mm3 10.50 11.20* 15.30* 19.50*   HEMOGLOBIN g/dL 8.3* 8.9* 8.2* 8.9*   PLATELETS 10*3/mm3 443 535* 469* 517*     BMP   Results from last 7 days   Lab Units 11/21/21  0534 11/20/21  0445 11/19/21  1930 11/19/21  0401 11/18/21  1204   SODIUM mmol/L 133* 135*  --  135* 134*   POTASSIUM mmol/L 3.8 3.8 4.2 3.0* 3.5   CHLORIDE mmol/L 94* 93*  --  92* 88*   CO2 mmol/L 30.0* 35.0*  --  34.0* 35.0*   BUN mg/dL 9 10  --  14 15   CREATININE mg/dL 0.64 0.62  --  0.67 0.72   GLUCOSE mg/dL 117* 114*  --  146* 191*   MAGNESIUM mg/dL 1.4* 1.5*  --  1.7 1.6     Radiology(recent) CT Chest With Contrast Diagnostic    Result Date: 11/20/2021  Probable partially calcified remnants of prior granulomatous disease in the anterior left upper lobe with no convincing evidence of malignant/metastatic disease in the chest. No acute intrathoracic abnormality.   Electronically Signed By-Anson Garber MD On:11/20/2021 8:10 PM This report was finalized on [id number] by  Anson Garber MD.      I reviewed the patient's new clinical results.    Assessment/Plan       Intra-abdominal abscess (HCC)    Recurrent malignant neoplasm (HCC)    Malignant neoplasm of vulva (HCC)    Type 2 diabetes mellitus (HCC)    Thrombocytosis    Left lower quadrant abdominal pain    Acute UTI (urinary tract infection)    Anemia    Dermatitis associated with moisture    Fever    Acute cystitis with hematuria    History of cancer of vulva    Chronic pain syndrome    HEBER (generalized anxiety disorder)      66 y.o. female large left lower quadrant abscess.  She was treated with percutaneous drainage, she is on antibiotics will be discharged on these per IDs recommendations.  Quality of the drain actually cleared up, looks serous now.  Okay for discharge from general surgery standpoint, follow-up with Dr. Carpenter in the office in a week or 2.  I will be available as needed, please call with changes, questions or concerns.        This note was created using Dragon Voice Recognition software.    Aurelio Crocker MD  11/21/21  13:28 EST

## 2021-11-21 NOTE — NURSING NOTE
Placed a call to [facility] to give report, spoke with Dian.    1549- Placed a call to Dian to inform her that ambulance will be here to transport patient around 1700 and that we no longer send hard scripts for narcotics; we do E-file.

## 2021-11-21 NOTE — NURSING NOTE
Placed a call to Nicolasa, patient's daughter to inform her that patient is being D/C to [facility] today. Will call back once I receive ambulance  time per her request.

## 2021-11-21 NOTE — PLAN OF CARE
Goal Outcome Evaluation:  Plan of Care Reviewed With: patient        Progress: no change  Outcome Summary: Patient is resting in bed. Pt shows no s/s of distress and vitals are stable. Pt voiced concerns of pain, PRN medication given. Pt refused wound care. Pt ambulated to the bathroom with a walker with an assist of 2 due to anxiety. Bed alarm on and call light within reach. Will continue to observe and treat as appropriate.

## 2021-11-21 NOTE — PROGRESS NOTES
Hematology/Oncology Inpatient Progress Note    PATIENT NAME: Carmella Pelayo  : 1955  MRN: [id number]    CHIEF COMPLAINT: Abdominal pain    HISTORY OF PRESENT ILLNESS:      Carmella Pelayo is a 66 y.o. female who presented to [facility] on 2021 with complaints of abdominal pain.  Patient reports to the ER provider that she has had left-sided abdominal pain for the last few weeks that has progressively worsened.  The the patient currently has on [facility] hospice with a past history diagnosis of vulvar cancer and vulvectomy in 2019, with recurrence after vulvectomy.  The CT abdomen pelvis shows there is soft thickening of tissue with ulceration involving the perineum, also a 25 cm lobulated collection along the left abdomen and pelvis which is concerning for abscess.  Labs and urinalysis along with blood culture obtained.  Zosyn IV in the ER was given and patient admitted for management of condition.     21  Hematology/Oncology was consulted history of recurrent vulvar cancer with vulvectomy in 2019, then recurrence and is a patient of Dr. Jose Borja at [facility] with last televisit on 2021.   The patient is stage Ib grade 2, SCC of the vulva.  S/p left radical Jorge vulvectomy, and left inguinal LND on 2019.  Patient declined adjuvant radiation with weekly cisplatin.  New left-sided vulvar lesion recurrent disease 2020.  EUA, cystoscopy and vulvar biopsy 2020 reveals squamous cell carcinoma invasive, of vulva.  PET/CT 2020 shows recurrent vulvar squamous cell cancer at site of biopsy no regional or distant metastasis disease.  Keytruda started 2020, development of pneumonitis 3/17/2021.  Personal decision made in 2021 to forego aggressive treatment of her disease and focus on quality of life issues per Dr. Castrejon note.      Patient [facility] on [facility]     She  has a past medical history of  Diabetes mellitus (HCC) and Vulva cancer (HCC).     PCP: Deana Wallace MD     History of present illness was reviewed and is unchanged from the previous visit. 11/20/21    Subjective      She does not have any specific complaints today.  She will be discharged later on this afternoon to rehab.    ROS:  Review of Systems   Constitutional: Negative for chills, fatigue and fever.   HENT: Negative for nosebleeds and sore throat.    Eyes: Negative for discharge, itching and visual disturbance.   Respiratory: Negative for cough and shortness of breath.    Cardiovascular: Negative for chest pain and palpitations.   Gastrointestinal: Negative for constipation, diarrhea, nausea and vomiting.   Endocrine: Negative for cold intolerance and heat intolerance.   Genitourinary: Negative for dysuria and hematuria.   Musculoskeletal: Negative for neck stiffness.   Skin: Negative for wound.   Neurological: Negative for dizziness and weakness.   Hematological: Does not bruise/bleed easily.   Psychiatric/Behavioral: Negative for agitation and confusion    MEDICATIONS:    Scheduled Meds:  ALPRAZolam, 0.25 mg, Oral, TID  aluminum sulfate-calcium acetate 1:20, 1,000 mL, Topical, BID  cefTRIAXone, 2 g, Intravenous, Q24H  diphenhydrAMINE, 25 mg, Intravenous, Once  folic acid, 1 mg, Oral, Daily  insulin lispro, 0-7 Units, Subcutaneous, TID AC  metroNIDAZOLE, 500 mg, Oral, Q8H  sodium chloride, 10 mL, Intravenous, Q12H  Zinc Oxide, , Apply externally, BID       Continuous Infusions:      PRN Meds:  •  acetaminophen **OR** acetaminophen **OR** acetaminophen  •  aluminum-magnesium hydroxide-simethicone  •  Calcium Gluconate-NaCl **AND** calcium gluconate **AND** Calcium, Ionized  •  dextrose  •  dextrose  •  glucagon (human recombinant)  •  HYDROmorphone  •  insulin lispro **AND** insulin lispro  •  Lidocaine HCl gel  •  magnesium sulfate **OR** magnesium sulfate **OR** magnesium sulfate  •  melatonin  •  ondansetron **OR**  "ondansetron  •  potassium & sodium phosphates **OR** potassium & sodium phosphates  •  potassium chloride **OR** potassium chloride **OR** potassium chloride  •  sodium chloride  •  sodium chloride     ALLERGIES:    Allergies   Allergen Reactions   • Meperidine Palpitations, Anxiety and Nausea And Vomiting     Other reaction(s): \"feel like I am going to pass out\", Feel like I am going to have panic attack, Irregular heart beat, Nausea         Objective    VITALS:   /61   Pulse 83   Temp 98.9 °F (37.2 °C)   Resp 18   Ht 157.5 cm (62\")   Wt 63 kg (138 lb 14.2 oz)   SpO2 96%   Breastfeeding No   BMI 25.40 kg/m²     PHYSICAL EXAM: (performed by MD)  Physical Exam  Constitutional:       General: She is not in acute distress.  HENT:      Head: Normocephalic and atraumatic.      Nose: Nose normal.      Mouth/Throat:      Mouth: Mucous membranes are moist.   Eyes:      General:         Right eye: No discharge.         Left eye: No discharge.      Extraocular Movements: Extraocular movements intact.      Conjunctiva/sclera: Conjunctivae normal.   Cardiovascular:      Rate and Rhythm: Normal rate and regular rhythm.      Pulses: Normal pulses.      Heart sounds: Normal heart sounds. No murmur heard.  No gallop.    Pulmonary:      Effort: Pulmonary effort is normal. No respiratory distress.      Breath sounds: Normal breath sounds. No wheezing or rhonchi.   Abdominal:      General: Bowel sounds are normal.      Palpations: Abdomen is soft.      Tenderness: There is abdominal tenderness.   Musculoskeletal:         General: Normal range of motion.      Cervical back: Normal range of motion and neck supple.      Right lower leg: No edema.      Left lower leg: No edema.   Skin:     General: Skin is warm and dry.      Capillary Refill: Capillary refill takes less than 2 seconds.      Comments: No obvious bleeding  Dressing right abdomen   Neurological:      Mental Status: She is alert and oriented to person, place, " and time.   Psychiatric:         Mood and Affect: Mood normal.         Behavior: Behavior normal.         Thought Content: Thought content normal.     Drainage tube noted  I have reexamined the patient and the results are consistent with the previously documented exam. Shital Pillai MD     RECENT LABS:    Lab Results (last 24 hours)     Procedure Component Value Units Date/Time    POC Glucose Once [[id number]]  (Abnormal) Collected: 11/21/21 1125    Specimen: Blood Updated: 11/21/21 1126     Glucose 196 mg/dL      Comment: Serial Number: [id number]:  [id number]       Wound Culture - Wound, Vagina [[id number]] Collected: 11/20/21 1211    Specimen: Wound from Vagina Updated: 11/21/21 1053     Wound Culture Culture in progress     Gram Stain Many (4+) WBCs per low power field      Rare (1+) Gram negative bacilli    Path Consult Reflex [[id number]] Collected: 11/21/21 0534    Specimen: Blood Updated: 11/21/21 0720     Pathology Review Yes    Manual Differential [[id number]]  (Abnormal) Collected: 11/21/21 0534    Specimen: Blood Updated: 11/21/21 0720     Neutrophil % 72.0 %      Lymphocyte % 11.0 %      Monocyte % 9.0 %      Eosinophil % 1.0 %      Bands %  2.0 %      Metamyelocyte % 1.0 %      Myelocyte % 3.0 %      Blasts % 1.0 %      Neutrophils Absolute 7.77 10*3/mm3      Lymphocytes Absolute 1.16 10*3/mm3      Monocytes Absolute 0.95 10*3/mm3      Eosinophils Absolute 0.11 10*3/mm3      Anisocytosis Slight/1+     Microcytes Slight/1+     Ovalocytes Slight/1+     Poikilocytes Slight/1+     Polychromasia Slight/1+     WBC Morphology Normal     Platelet Estimate Adequate     Large Platelets Slight/1+    Narrative:      Blasts and/or Pros >0%  Metas and/or Myelos >10%    CBC & Differential [[id number]]  (Abnormal) Collected: 11/21/21 0534    Specimen: Blood Updated: 11/21/21 0720    Narrative:      The following orders were created for panel order CBC & Differential.  Procedure                                Abnormality         Status                     ---------                               -----------         ------                     CBC Auto Differential[[id number]]        Abnormal            Final result               Scan Slide[[id number]]                                       Final result                 Please view results for these tests on the individual orders.    Scan Slide [[id number]] Collected: 11/21/21 0534    Specimen: Blood Updated: 11/21/21 0720     Scan Slide --     Comment: See Manual Differential Results       CBC Auto Differential [[id number]]  (Abnormal) Collected: 11/21/21 0534    Specimen: Blood Updated: 11/21/21 0720     WBC 10.50 10*3/mm3      RBC 3.43 10*6/mm3      Hemoglobin 8.3 g/dL      Hematocrit 25.5 %      MCV 74.5 fL      MCH 24.2 pg      MCHC 32.4 g/dL      RDW 17.2 %      RDW-SD 45.1 fl      MPV 6.9 fL      Platelets 443 10*3/mm3     Narrative:      The previously reported component NRBC is no longer being reported. Previous result was 0.1 /100 WBC (Reference Range: 0.0-0.2 /100 WBC) on 11/21/2021 at 0636 EST.    POC Glucose Once [[id number]]  (Abnormal) Collected: 11/21/21 0713    Specimen: Blood Updated: 11/21/21 0714     Glucose 164 mg/dL      Comment: Serial Number: [id number]:  [id number]       Basic Metabolic Panel [[id number]]  (Abnormal) Collected: 11/21/21 0534    Specimen: Blood Updated: 11/21/21 0657     Glucose 117 mg/dL      BUN 9 mg/dL      Creatinine 0.64 mg/dL      Sodium 133 mmol/L      Potassium 3.8 mmol/L      Chloride 94 mmol/L      CO2 30.0 mmol/L      Calcium 8.7 mg/dL      eGFR Non African Amer 93 mL/min/1.73      BUN/Creatinine Ratio 14.1     Anion Gap 9.0 mmol/L     Narrative:      GFR Normal >60  Chronic Kidney Disease <60  Kidney Failure <15      Magnesium [[id number]]  (Abnormal) Collected: 11/21/21 0534    Specimen: Blood Updated: 11/21/21 0657     Magnesium 1.4 mg/dL     POC Glucose Once [[id number]]  (Abnormal) Collected: 11/20/21 2131    Specimen:  Blood Updated: 11/20/21 2132     Glucose 164 mg/dL      Comment: Serial Number: [id number]:  [id number]       POC Glucose Once [[id number]]  (Abnormal) Collected: 11/20/21 1600    Specimen: Blood Updated: 11/20/21 1601     Glucose 146 mg/dL      Comment: Serial Number: [id number]:  [id number]       Blood Culture - Blood, Arm, Right [[id number]]  (Normal) Collected: 11/18/21 1449    Specimen: Blood from Arm, Right Updated: 11/20/21 1502     Blood Culture No growth at 2 days    Blood Culture - Blood, Arm, Right [[id number]]  (Normal) Collected: 11/18/21 1358    Specimen: Blood from Arm, Right Updated: 11/20/21 1402     Blood Culture No growth at 2 days    Urine Culture - Urine, Urine, Clean Catch [[id number]] Collected: 11/18/21 1305    Specimen: Urine, Clean Catch Updated: 11/20/21 1328     Urine Culture >100,000 CFU/mL Mixed Juwan Isolated    Narrative:      Specimen contains mixed organisms of questionable pathogenicity which indicates contamination with commensal juwan.  Further identification is unlikely to provide clinically useful information.  Suggest recollection.          PENDING RESULTS:     IMAGING REVIEWED:  CT Chest With Contrast Diagnostic    Result Date: 11/20/2021  Probable partially calcified remnants of prior granulomatous disease in the anterior left upper lobe with no convincing evidence of malignant/metastatic disease in the chest. No acute intrathoracic abnormality.  Electronically Signed By-Anson Garber MD On:11/20/2021 8:10 PM This report was finalized on [id number] by  Anson Garber MD.      Assessment/Plan   ASSESSMENT:    Recurrent vulvar cancer: Stage Ib grade 2 SCC of the vulva.  S/p left radical Jorge vulvectomy, and left inguinal LND on 4/18/2019.  Patient declined adjuvant radiation with weekly cisplatin.  New left-sided vulvar lesion recurrent disease 2/23/2020.  EUA, cystoscopy and vulvar biopsy 11/12/2020 reveals squamous cell carcinoma invasive, of vulva.  PET/CT  12/7/2020 shows recurrent vulvar squamous cell cancer at site of biopsy no regional or distant metastasis disease.  Keytruda started December 2020, development of pneumonitis 3/17/2021.  Personal decision made in August 2021 to forego aggressive treatment of her disease and focus on quality of life issues per Dr. Borja 9/22/21 note..  We will obtain the CT scan of the chest.  Patient is now thinking that she may want treatment for her malignancy.  Reviewed her CT of the chest there is no evidence of malignancy.     Large intra-abdominal abscess of left lower quadrant: CT 11/18/2021 reveals 25 cm lobulated rim-enhancing collection along the left abdomen and pelvis concerning for abscess.    Percutaneous drainage was performed 11/18/2021 per Dr. Carpenter.  Fluid growth reveals Streptococcus Agalactieae.    Infectious disease consulted.  Continue drainage tube  Final Blood cultures are pending     Leukocytosis: Related to abdominal abscess     Thrombocytosis: Platelet 535.  Monitor CBC daily     Anemia: Related to Malignancy, chronic infection, chronic illness.  Folate level 4.09.  Begin Folic acid 1 mg daily    Iron Deficiency Anemia: Iron level 29.  Saturation 20%. Iron levels low, would benefit from Ferrlecit 250 mg IV to aid in healing process after surgery     Hypokalemia: Potassium 3.0.  Replete per primary: Resolved, potassium 3.8 today     Urinary Tract Infection: Await urine culture    Other chronic conditions: DM type II, dermatitis associated with moisture, urinary tract infection,    PLAN:  1. Ferrlecit 250 mg IV: aid in healing process and correct iron deficiency  2. Patient is currently on IV Rocephin 2 g every 24 hours for total of 14 days treatment and p.o. Flagyl for also a total of 14 days treatment.  Patient will be discharged this afternoon  3. Follow with general surgery  4. She will follow-up with us in the outpatient setting  5. Supportive care  6. All questions answered        Electronically  signed by Shital Pillai MD, 11/21/21, 12:46 PM EST.

## 2021-11-22 LAB
LAB AP CASE REPORT: NORMAL
PATH REPORT.FINAL DX SPEC: NORMAL

## 2021-11-22 NOTE — CASE MANAGEMENT/SOCIAL WORK
Case Management Discharge Note           Selected Continued Care - Discharged on 11/21/2021 Admission date: 11/18/2021 - Discharge disposition: Rehab Facility or Unit (DC - External)    Destination Coordination complete.    Service Provider Selected Services Address Phone Fax Patient Preferred    [facility]  Skilled Nursing [address] [place] [address] [phone] --           Final Discharge Disposition Code: 03 - skilled nursing facility (SNF)

## 2021-11-22 NOTE — CASE MANAGEMENT/SOCIAL WORK
Case Management Discharge Note         Selected Continued Care - Discharged on 11/21/2021 Admission date: 11/18/2021 - Discharge disposition: Rehab Facility or Unit (DC - External)    Destination Coordination complete.    Service Provider Selected Services Address Phone Fax Patient Preferred    [facility]  Skilled Nursing [address] [place] [address] [phone] --

## 2021-11-23 LAB
BACTERIA SPEC AEROBE CULT: ABNORMAL
BACTERIA SPEC AEROBE CULT: ABNORMAL
BACTERIA SPEC AEROBE CULT: NORMAL
BACTERIA SPEC AEROBE CULT: NORMAL
GRAM STN SPEC: ABNORMAL
GRAM STN SPEC: ABNORMAL

## 2021-11-26 LAB — CHYLO FLD QL: NORMAL

## 2021-11-29 ENCOUNTER — TELEPHONE (OUTPATIENT)
Dept: BARIATRICS/WEIGHT MGMT | Facility: CLINIC | Age: 66
End: 2021-11-29

## 2021-11-29 NOTE — TELEPHONE ENCOUNTER
Ann called from [facility] for follow up care for patient.  Patient has KATIE drain and needs appointment to see Dr. Carpenter.  Ann will call back to schedule for patient. 11/29/21 MN

## 2021-12-03 ENCOUNTER — PATIENT ROUNDING (BHMG ONLY) (OUTPATIENT)
Dept: BARIATRICS/WEIGHT MGMT | Facility: CLINIC | Age: 66
End: 2021-12-03

## 2021-12-03 ENCOUNTER — OFFICE VISIT (OUTPATIENT)
Dept: BARIATRICS/WEIGHT MGMT | Facility: CLINIC | Age: 66
End: 2021-12-03

## 2021-12-03 VITALS
DIASTOLIC BLOOD PRESSURE: 84 MMHG | HEART RATE: 107 BPM | WEIGHT: 123 LBS | HEIGHT: 62 IN | TEMPERATURE: 98.7 F | SYSTOLIC BLOOD PRESSURE: 136 MMHG | RESPIRATION RATE: 16 BRPM | OXYGEN SATURATION: 94 % | BODY MASS INDEX: 22.63 KG/M2

## 2021-12-03 DIAGNOSIS — E66.01 OBESITY, CLASS III, BMI 40-49.9 (MORBID OBESITY) (HCC): Primary | ICD-10-CM

## 2021-12-03 PROCEDURE — 99213 OFFICE O/P EST LOW 20 MIN: CPT | Performed by: SURGERY

## 2021-12-03 PROCEDURE — 87070 CULTURE OTHR SPECIMN AEROBIC: CPT | Performed by: SURGERY

## 2021-12-03 PROCEDURE — 87205 SMEAR GRAM STAIN: CPT | Performed by: SURGERY

## 2021-12-03 RX ORDER — AMOXICILLIN AND CLAVULANATE POTASSIUM 875; 125 MG/1; MG/1
1 TABLET, FILM COATED ORAL EVERY 12 HOURS SCHEDULED
Status: DISCONTINUED | OUTPATIENT
Start: 2021-12-03 | End: 2021-12-04

## 2021-12-03 RX ORDER — HYDROCODONE BITARTRATE AND ACETAMINOPHEN 10; 325 MG/1; MG/1
1 TABLET ORAL EVERY 6 HOURS PRN
Qty: 30 TABLET | Refills: 0 | Status: ON HOLD | OUTPATIENT
Start: 2021-12-03 | End: 2021-12-04

## 2021-12-03 RX ORDER — INSULIN GLARGINE 100 [IU]/ML
INJECTION, SOLUTION SUBCUTANEOUS
Status: ON HOLD | COMMUNITY
Start: 2021-10-12 | End: 2021-12-04

## 2021-12-03 RX ORDER — PEN NEEDLE, DIABETIC 32GX 5/32"
NEEDLE, DISPOSABLE MISCELLANEOUS 3 TIMES DAILY
Status: ON HOLD | COMMUNITY
Start: 2021-10-12 | End: 2021-12-04

## 2021-12-03 RX ORDER — LOPERAMIDE HYDROCHLORIDE 2 MG/1
CAPSULE ORAL
Status: ON HOLD | COMMUNITY
Start: 2021-11-08 | End: 2021-12-04

## 2021-12-03 RX ORDER — AMOXICILLIN AND CLAVULANATE POTASSIUM 875; 125 MG/1; MG/1
1 TABLET, FILM COATED ORAL 2 TIMES DAILY
Qty: 28 TABLET | Refills: 0 | Status: ON HOLD | OUTPATIENT
Start: 2021-12-03 | End: 2021-12-04

## 2021-12-03 RX ORDER — ONDANSETRON 8 MG/1
8 TABLET, ORALLY DISINTEGRATING ORAL EVERY 8 HOURS PRN
Qty: 30 TABLET | Refills: 5 | Status: ON HOLD | OUTPATIENT
Start: 2021-12-03 | End: 2021-12-04

## 2021-12-03 RX ORDER — FUROSEMIDE 20 MG/1
20 TABLET ORAL DAILY
COMMUNITY
Start: 2021-11-08 | End: 2021-12-03

## 2021-12-03 RX ORDER — HYDROCODONE BITARTRATE AND ACETAMINOPHEN 5; 325 MG/1; MG/1
1-2 TABLET ORAL EVERY 4 HOURS PRN
COMMUNITY
Start: 2021-11-15 | End: 2021-12-03

## 2021-12-03 NOTE — PROGRESS NOTES
HISTORY AND PHYSICAL      Patient Care Team:  Deana Wallace MD as PCP - General (Family Medicine)    Chief complaint follow up for drain    Subjective     This 66-year-old lady who presented to the hospital recently with left flank pain and was found on CT scan a very very large abscess of the left lower quadrant and left flank.  She did have a history of diverticulosis but there was no evidence of diverticulitis on the CT scan.  She has a history of vulvar cancer status post surgery and radiation and it was felt she had ongoing disease and evidence of ongoing pelvic tumor.  During the hospitalizations infectious disease was consulted and they felt like the pelvic tumor likely was the etiology of the abscess that that still is unclear.  During the hospitalization she had a percutaneous drain placed by interventional radiology.  Culture from the drain grew strep agalactiae.  She has been on IV Rocephin and p.o. Flagyl at the nursing home.  Prior to her hospitalization she was under hospice care.  Review of Systems   Pertinent items are noted in HPI    History  Past Medical History:   Diagnosis Date   • Diabetes mellitus (HCC)    • Vulva cancer (HCC)      Past Surgical History:   Procedure Laterality Date   • VULVECTOMY       No family history on file.  Social History     Tobacco Use   • Smoking status: Never Smoker   • Smokeless tobacco: Never Used   Substance Use Topics   • Alcohol use: Not Currently   • Drug use: Not Currently     (Not in a hospital admission)    Allergies:  Meperidine    Objective     Vital Signs  Temp:  [98.7 °F (37.1 °C)] 98.7 °F (37.1 °C)  Heart Rate:  [107] 107  Resp:  [16] 16  BP: (136)/(84) 136/84    Physical Exam:      General Appearance:    Alert, cooperative, in no acute distress   Head:    Normocephalic, without obvious abnormality, atraumatic   Eyes:            Lids and lashes normal, conjunctivae and sclerae normal, no   icterus, no pallor, corneas clear, PERRLA   Ears:    Ears  appear intact with no abnormalities noted   Throat:   No oral lesions, no thrush, oral mucosa moist   Neck:   No adenopathy, supple, trachea midline, no thyromegaly, no   carotid bruit, no JVD   Back:     No kyphosis present, no scoliosis present, no skin lesions,      erythema or scars, no tenderness to percussion or                   palpation,   range of motion normal   Lungs:     Clear to auscultation,respirations regular, even and                  unlabored    Heart:    Regular rhythm and normal rate, normal S1 and S2, no            murmur, no gallop, no rub, no click   Chest Wall:    No abnormalities observed   Abdomen:     Normal bowel sounds, no masses, no organomegaly, soft        non-tender, non-distended, no guarding, no rebound                tenderness   Rectal:     Deferred   Extremities:   Moves all extremities well, no edema, no cyanosis, no             redness   Pulses:   Pulses palpable and equal bilaterally   Skin:   No bleeding, bruising or rash   Lymph nodes:   No palpable adenopathy   Neurologic:   Cranial nerves 2 - 12 grossly intact, sensation intact, DTR       present and equal bilaterally     Lab Results (last 24 hours)     ** No results found for the last 24 hours. **          Imaging Results (Last 24 Hours)     ** No results found for the last 24 hours. **          Results Review:    I reviewed the patient's new clinical results.  I reviewed the patient's new imaging results and agree with the interpretation.    Assessment/Plan   Metastatic vulvar cancer  Large intra-abdominal abscess    This is a 66-year-old lady presents for follow-up after percutaneous drainage of a large intra-abdominal abscess.  She has a history of vulvar cancer status post surgery and chemotherapy several months ago.  She is still experiencing some left lower quadrant pain and upper left leg pain when she stands.  This is a chronic pain.    At this time I would like to repeat a CT scan to further evaluate.   Continue antibiotics for now.  She would very much like to leave the nursing home and go back on that is appropriate.  I believe it would be try to transition her back to hospice care.    Jordana Carpenter MD  12/03/21  10:45 EST

## 2021-12-04 ENCOUNTER — APPOINTMENT (OUTPATIENT)
Dept: CT IMAGING | Facility: HOSPITAL | Age: 66
End: 2021-12-04

## 2021-12-04 ENCOUNTER — HOSPITAL ENCOUNTER (INPATIENT)
Facility: HOSPITAL | Age: 66
LOS: 4 days | Discharge: NURSING, REHAB OR LONG-TERM CARE | End: 2021-12-08
Attending: EMERGENCY MEDICINE | Admitting: INTERNAL MEDICINE

## 2021-12-04 DIAGNOSIS — K65.1 INTRA-ABDOMINAL ABSCESS (HCC): Primary | ICD-10-CM

## 2021-12-04 DIAGNOSIS — C51.9 MALIGNANT NEOPLASM OF VULVA (HCC): Chronic | ICD-10-CM

## 2021-12-04 DIAGNOSIS — M86.9 OSTEOMYELITIS OF PELVIS (HCC): ICD-10-CM

## 2021-12-04 DIAGNOSIS — E87.6 HYPOKALEMIA: ICD-10-CM

## 2021-12-04 PROBLEM — M86.18: Status: ACTIVE | Noted: 2021-12-04

## 2021-12-04 PROBLEM — L02.416 ABSCESS OF LEFT HIP: Status: ACTIVE | Noted: 2021-12-04

## 2021-12-04 PROBLEM — M25.552 LEFT HIP PAIN: Status: ACTIVE | Noted: 2021-12-04

## 2021-12-04 PROBLEM — M71.052: Status: ACTIVE | Noted: 2021-12-04

## 2021-12-04 PROBLEM — E83.42 HYPOMAGNESEMIA: Status: ACTIVE | Noted: 2021-12-04

## 2021-12-04 LAB
ALBUMIN SERPL-MCNC: 2.7 G/DL (ref 3.5–5.2)
ALBUMIN/GLOB SERPL: 0.6 G/DL
ALP SERPL-CCNC: 353 U/L (ref 39–117)
ALT SERPL W P-5'-P-CCNC: 8 U/L (ref 1–33)
ANION GAP SERPL CALCULATED.3IONS-SCNC: 11 MMOL/L (ref 5–15)
ANION GAP SERPL CALCULATED.3IONS-SCNC: 13 MMOL/L (ref 5–15)
AST SERPL-CCNC: 7 U/L (ref 1–32)
BACTERIA UR QL AUTO: ABNORMAL /HPF
BASOPHILS # BLD AUTO: 0.1 10*3/MM3 (ref 0–0.2)
BASOPHILS NFR BLD AUTO: 1.3 % (ref 0–1.5)
BILIRUB SERPL-MCNC: 0.6 MG/DL (ref 0–1.2)
BILIRUB UR QL STRIP: NEGATIVE
BUN SERPL-MCNC: 5 MG/DL (ref 8–23)
BUN SERPL-MCNC: 5 MG/DL (ref 8–23)
BUN/CREAT SERPL: 11.6 (ref 7–25)
BUN/CREAT SERPL: 13.2 (ref 7–25)
CALCIUM SPEC-SCNC: 7.9 MG/DL (ref 8.6–10.5)
CALCIUM SPEC-SCNC: 8.6 MG/DL (ref 8.6–10.5)
CHLORIDE SERPL-SCNC: 94 MMOL/L (ref 98–107)
CHLORIDE SERPL-SCNC: 99 MMOL/L (ref 98–107)
CLARITY UR: CLEAR
CO2 SERPL-SCNC: 31 MMOL/L (ref 22–29)
CO2 SERPL-SCNC: 31 MMOL/L (ref 22–29)
COLOR UR: YELLOW
CREAT SERPL-MCNC: 0.38 MG/DL (ref 0.57–1)
CREAT SERPL-MCNC: 0.43 MG/DL (ref 0.57–1)
CRP SERPL-MCNC: 6.83 MG/DL (ref 0–0.5)
DEPRECATED RDW RBC AUTO: 56.9 FL (ref 37–54)
EOSINOPHIL # BLD AUTO: 0.2 10*3/MM3 (ref 0–0.4)
EOSINOPHIL NFR BLD AUTO: 2.3 % (ref 0.3–6.2)
ERYTHROCYTE [DISTWIDTH] IN BLOOD BY AUTOMATED COUNT: 21.4 % (ref 12.3–15.4)
GFR SERPL CREATININE-BSD FRML MDRD: 147 ML/MIN/1.73
GFR SERPL CREATININE-BSD FRML MDRD: >150 ML/MIN/1.73
GLOBULIN UR ELPH-MCNC: 4.5 GM/DL
GLUCOSE BLDC GLUCOMTR-MCNC: 143 MG/DL (ref 70–105)
GLUCOSE BLDC GLUCOMTR-MCNC: 153 MG/DL (ref 70–105)
GLUCOSE BLDC GLUCOMTR-MCNC: 212 MG/DL (ref 70–105)
GLUCOSE SERPL-MCNC: 140 MG/DL (ref 65–99)
GLUCOSE SERPL-MCNC: 154 MG/DL (ref 65–99)
GLUCOSE UR STRIP-MCNC: NEGATIVE MG/DL
HCT VFR BLD AUTO: 29.7 % (ref 34–46.6)
HGB BLD-MCNC: 9.7 G/DL (ref 12–15.9)
HGB UR QL STRIP.AUTO: ABNORMAL
HYALINE CASTS UR QL AUTO: ABNORMAL /LPF
KETONES UR QL STRIP: NEGATIVE
LEUKOCYTE ESTERASE UR QL STRIP.AUTO: ABNORMAL
LIPASE SERPL-CCNC: 13 U/L (ref 13–60)
LYMPHOCYTES # BLD AUTO: 1.2 10*3/MM3 (ref 0.7–3.1)
LYMPHOCYTES NFR BLD AUTO: 13.9 % (ref 19.6–45.3)
MAGNESIUM SERPL-MCNC: 1.3 MG/DL (ref 1.6–2.4)
MCH RBC QN AUTO: 25 PG (ref 26.6–33)
MCHC RBC AUTO-ENTMCNC: 32.6 G/DL (ref 31.5–35.7)
MCV RBC AUTO: 76.7 FL (ref 79–97)
MONOCYTES # BLD AUTO: 0.8 10*3/MM3 (ref 0.1–0.9)
MONOCYTES NFR BLD AUTO: 9.2 % (ref 5–12)
NEUTROPHILS NFR BLD AUTO: 6.3 10*3/MM3 (ref 1.7–7)
NEUTROPHILS NFR BLD AUTO: 73.3 % (ref 42.7–76)
NITRITE UR QL STRIP: NEGATIVE
NRBC BLD AUTO-RTO: 0.1 /100 WBC (ref 0–0.2)
PH UR STRIP.AUTO: 7 [PH] (ref 5–8)
PLATELET # BLD AUTO: 312 10*3/MM3 (ref 140–450)
PMV BLD AUTO: 7.2 FL (ref 6–12)
POTASSIUM SERPL-SCNC: 2.3 MMOL/L (ref 3.5–5.2)
POTASSIUM SERPL-SCNC: 2.5 MMOL/L (ref 3.5–5.2)
PROCALCITONIN SERPL-MCNC: 0.12 NG/ML (ref 0–0.25)
PROT SERPL-MCNC: 7.2 G/DL (ref 6–8.5)
PROT UR QL STRIP: NEGATIVE
RBC # BLD AUTO: 3.88 10*6/MM3 (ref 3.77–5.28)
RBC # UR STRIP: ABNORMAL /HPF
REF LAB TEST METHOD: ABNORMAL
SARS-COV-2 RNA PNL SPEC NAA+PROBE: NOT DETECTED
SODIUM SERPL-SCNC: 138 MMOL/L (ref 136–145)
SODIUM SERPL-SCNC: 141 MMOL/L (ref 136–145)
SP GR UR STRIP: 1.01 (ref 1–1.03)
SQUAMOUS #/AREA URNS HPF: ABNORMAL /HPF
UROBILINOGEN UR QL STRIP: ABNORMAL
WBC # UR STRIP: ABNORMAL /HPF
WBC NRBC COR # BLD: 8.6 10*3/MM3 (ref 3.4–10.8)
YEAST URNS QL MICRO: ABNORMAL /HPF

## 2021-12-04 PROCEDURE — 36415 COLL VENOUS BLD VENIPUNCTURE: CPT | Performed by: INTERNAL MEDICINE

## 2021-12-04 PROCEDURE — 87086 URINE CULTURE/COLONY COUNT: CPT | Performed by: EMERGENCY MEDICINE

## 2021-12-04 PROCEDURE — 85025 COMPLETE CBC W/AUTO DIFF WBC: CPT | Performed by: EMERGENCY MEDICINE

## 2021-12-04 PROCEDURE — 25010000002 CEFEPIME PER 500 MG: Performed by: EMERGENCY MEDICINE

## 2021-12-04 PROCEDURE — 99222 1ST HOSP IP/OBS MODERATE 55: CPT | Performed by: SURGERY

## 2021-12-04 PROCEDURE — 87040 BLOOD CULTURE FOR BACTERIA: CPT | Performed by: NURSE PRACTITIONER

## 2021-12-04 PROCEDURE — 99221 1ST HOSP IP/OBS SF/LOW 40: CPT | Performed by: ORTHOPAEDIC SURGERY

## 2021-12-04 PROCEDURE — 25010000002 CEFTRIAXONE PER 250 MG: Performed by: NURSE PRACTITIONER

## 2021-12-04 PROCEDURE — U0005 INFEC AGEN DETEC AMPLI PROBE: HCPCS | Performed by: EMERGENCY MEDICINE

## 2021-12-04 PROCEDURE — 82962 GLUCOSE BLOOD TEST: CPT

## 2021-12-04 PROCEDURE — 74177 CT ABD & PELVIS W/CONTRAST: CPT

## 2021-12-04 PROCEDURE — 84145 PROCALCITONIN (PCT): CPT | Performed by: NURSE PRACTITIONER

## 2021-12-04 PROCEDURE — 86140 C-REACTIVE PROTEIN: CPT | Performed by: NURSE PRACTITIONER

## 2021-12-04 PROCEDURE — 25010000002 CEFEPIME PER 500 MG: Performed by: NURSE PRACTITIONER

## 2021-12-04 PROCEDURE — 0 POTASSIUM CHLORIDE 10 MEQ/100ML SOLUTION: Performed by: EMERGENCY MEDICINE

## 2021-12-04 PROCEDURE — 0 IOPAMIDOL PER 1 ML: Performed by: EMERGENCY MEDICINE

## 2021-12-04 PROCEDURE — 99285 EMERGENCY DEPT VISIT HI MDM: CPT

## 2021-12-04 PROCEDURE — 80053 COMPREHEN METABOLIC PANEL: CPT | Performed by: EMERGENCY MEDICINE

## 2021-12-04 PROCEDURE — 81001 URINALYSIS AUTO W/SCOPE: CPT | Performed by: EMERGENCY MEDICINE

## 2021-12-04 PROCEDURE — 99222 1ST HOSP IP/OBS MODERATE 55: CPT | Performed by: INTERNAL MEDICINE

## 2021-12-04 PROCEDURE — 83735 ASSAY OF MAGNESIUM: CPT | Performed by: EMERGENCY MEDICINE

## 2021-12-04 PROCEDURE — 83690 ASSAY OF LIPASE: CPT | Performed by: EMERGENCY MEDICINE

## 2021-12-04 PROCEDURE — 25010000002 MAGNESIUM SULFATE 2 GM/50ML SOLUTION: Performed by: NURSE PRACTITIONER

## 2021-12-04 PROCEDURE — 25010000002 VANCOMYCIN 1 G RECONSTITUTED SOLUTION 1 EACH VIAL: Performed by: EMERGENCY MEDICINE

## 2021-12-04 PROCEDURE — 63710000001 INSULIN LISPRO (HUMAN) PER 5 UNITS: Performed by: NURSE PRACTITIONER

## 2021-12-04 PROCEDURE — U0003 INFECTIOUS AGENT DETECTION BY NUCLEIC ACID (DNA OR RNA); SEVERE ACUTE RESPIRATORY SYNDROME CORONAVIRUS 2 (SARS-COV-2) (CORONAVIRUS DISEASE [COVID-19]), AMPLIFIED PROBE TECHNIQUE, MAKING USE OF HIGH THROUGHPUT TECHNOLOGIES AS DESCRIBED BY CMS-2020-01-R: HCPCS | Performed by: EMERGENCY MEDICINE

## 2021-12-04 RX ORDER — MAGNESIUM SULFATE HEPTAHYDRATE 40 MG/ML
2 INJECTION, SOLUTION INTRAVENOUS AS NEEDED
Status: DISCONTINUED | OUTPATIENT
Start: 2021-12-04 | End: 2021-12-08 | Stop reason: HOSPADM

## 2021-12-04 RX ORDER — SODIUM CHLORIDE 0.9 % (FLUSH) 0.9 %
10 SYRINGE (ML) INJECTION AS NEEDED
Status: DISCONTINUED | OUTPATIENT
Start: 2021-12-04 | End: 2021-12-08 | Stop reason: HOSPADM

## 2021-12-04 RX ORDER — ONDANSETRON 4 MG/1
4 TABLET, FILM COATED ORAL EVERY 6 HOURS PRN
Status: DISCONTINUED | OUTPATIENT
Start: 2021-12-04 | End: 2021-12-08 | Stop reason: HOSPADM

## 2021-12-04 RX ORDER — OLANZAPINE 10 MG/2ML
1 INJECTION, POWDER, LYOPHILIZED, FOR SOLUTION INTRAMUSCULAR AS NEEDED
Status: DISCONTINUED | OUTPATIENT
Start: 2021-12-04 | End: 2021-12-08 | Stop reason: HOSPADM

## 2021-12-04 RX ORDER — SODIUM CHLORIDE 0.9 % (FLUSH) 0.9 %
10 SYRINGE (ML) INJECTION EVERY 12 HOURS SCHEDULED
Status: DISCONTINUED | OUTPATIENT
Start: 2021-12-04 | End: 2021-12-08 | Stop reason: HOSPADM

## 2021-12-04 RX ORDER — CALCIUM GLUCONATE 20 MG/ML
1 INJECTION, SOLUTION INTRAVENOUS AS NEEDED
Status: DISCONTINUED | OUTPATIENT
Start: 2021-12-04 | End: 2021-12-08 | Stop reason: HOSPADM

## 2021-12-04 RX ORDER — CHOLECALCIFEROL (VITAMIN D3) 125 MCG
5 CAPSULE ORAL NIGHTLY PRN
Status: DISCONTINUED | OUTPATIENT
Start: 2021-12-04 | End: 2021-12-08 | Stop reason: HOSPADM

## 2021-12-04 RX ORDER — METRONIDAZOLE 500 MG/1
500 TABLET ORAL EVERY 8 HOURS SCHEDULED
Status: DISCONTINUED | OUTPATIENT
Start: 2021-12-04 | End: 2021-12-08 | Stop reason: HOSPADM

## 2021-12-04 RX ORDER — MAGNESIUM SULFATE HEPTAHYDRATE 40 MG/ML
2 INJECTION, SOLUTION INTRAVENOUS AS NEEDED
Status: DISCONTINUED | OUTPATIENT
Start: 2021-12-04 | End: 2021-12-04 | Stop reason: SDUPTHER

## 2021-12-04 RX ORDER — ACETAMINOPHEN 650 MG/1
650 SUPPOSITORY RECTAL EVERY 4 HOURS PRN
Status: DISCONTINUED | OUTPATIENT
Start: 2021-12-04 | End: 2021-12-08 | Stop reason: HOSPADM

## 2021-12-04 RX ORDER — SODIUM CHLORIDE 9 MG/ML
100 INJECTION, SOLUTION INTRAVENOUS CONTINUOUS
Status: DISCONTINUED | OUTPATIENT
Start: 2021-12-04 | End: 2021-12-05

## 2021-12-04 RX ORDER — CALCIUM GLUCONATE 20 MG/ML
2 INJECTION, SOLUTION INTRAVENOUS AS NEEDED
Status: DISCONTINUED | OUTPATIENT
Start: 2021-12-04 | End: 2021-12-08 | Stop reason: HOSPADM

## 2021-12-04 RX ORDER — MAGNESIUM SULFATE HEPTAHYDRATE 40 MG/ML
4 INJECTION, SOLUTION INTRAVENOUS AS NEEDED
Status: DISCONTINUED | OUTPATIENT
Start: 2021-12-04 | End: 2021-12-04 | Stop reason: SDUPTHER

## 2021-12-04 RX ORDER — ALUMINA, MAGNESIA, AND SIMETHICONE 2400; 2400; 240 MG/30ML; MG/30ML; MG/30ML
15 SUSPENSION ORAL EVERY 6 HOURS PRN
Status: DISCONTINUED | OUTPATIENT
Start: 2021-12-04 | End: 2021-12-08 | Stop reason: HOSPADM

## 2021-12-04 RX ORDER — POTASSIUM CHLORIDE 7.45 MG/ML
10 INJECTION INTRAVENOUS
Status: DISCONTINUED | OUTPATIENT
Start: 2021-12-04 | End: 2021-12-08 | Stop reason: HOSPADM

## 2021-12-04 RX ORDER — ONDANSETRON 2 MG/ML
4 INJECTION INTRAMUSCULAR; INTRAVENOUS EVERY 6 HOURS PRN
Status: DISCONTINUED | OUTPATIENT
Start: 2021-12-04 | End: 2021-12-08 | Stop reason: HOSPADM

## 2021-12-04 RX ORDER — POTASSIUM CHLORIDE 1.5 G/1.77G
40 POWDER, FOR SOLUTION ORAL AS NEEDED
Status: DISCONTINUED | OUTPATIENT
Start: 2021-12-04 | End: 2021-12-08 | Stop reason: HOSPADM

## 2021-12-04 RX ORDER — ACETAMINOPHEN 160 MG/5ML
650 SOLUTION ORAL EVERY 4 HOURS PRN
Status: DISCONTINUED | OUTPATIENT
Start: 2021-12-04 | End: 2021-12-08 | Stop reason: HOSPADM

## 2021-12-04 RX ORDER — MAGNESIUM SULFATE HEPTAHYDRATE 40 MG/ML
4 INJECTION, SOLUTION INTRAVENOUS AS NEEDED
Status: DISCONTINUED | OUTPATIENT
Start: 2021-12-04 | End: 2021-12-08 | Stop reason: HOSPADM

## 2021-12-04 RX ORDER — DEXTROSE MONOHYDRATE 25 G/50ML
25 INJECTION, SOLUTION INTRAVENOUS
Status: DISCONTINUED | OUTPATIENT
Start: 2021-12-04 | End: 2021-12-08 | Stop reason: HOSPADM

## 2021-12-04 RX ORDER — POTASSIUM CHLORIDE 20 MEQ/1
40 TABLET, EXTENDED RELEASE ORAL AS NEEDED
Status: DISCONTINUED | OUTPATIENT
Start: 2021-12-04 | End: 2021-12-04 | Stop reason: SDUPTHER

## 2021-12-04 RX ORDER — NICOTINE POLACRILEX 4 MG
15 LOZENGE BUCCAL
Status: DISCONTINUED | OUTPATIENT
Start: 2021-12-04 | End: 2021-12-08 | Stop reason: HOSPADM

## 2021-12-04 RX ORDER — ACETAMINOPHEN 325 MG/1
650 TABLET ORAL EVERY 4 HOURS PRN
Status: DISCONTINUED | OUTPATIENT
Start: 2021-12-04 | End: 2021-12-08 | Stop reason: HOSPADM

## 2021-12-04 RX ORDER — POTASSIUM CHLORIDE 20 MEQ/1
40 TABLET, EXTENDED RELEASE ORAL AS NEEDED
Status: DISCONTINUED | OUTPATIENT
Start: 2021-12-04 | End: 2021-12-08 | Stop reason: HOSPADM

## 2021-12-04 RX ORDER — POTASSIUM CHLORIDE 1.5 G/1.77G
40 POWDER, FOR SOLUTION ORAL AS NEEDED
Status: DISCONTINUED | OUTPATIENT
Start: 2021-12-04 | End: 2021-12-04

## 2021-12-04 RX ORDER — INSULIN LISPRO 100 [IU]/ML
0-7 INJECTION, SOLUTION INTRAVENOUS; SUBCUTANEOUS EVERY 6 HOURS SCHEDULED
Status: DISCONTINUED | OUTPATIENT
Start: 2021-12-04 | End: 2021-12-07

## 2021-12-04 RX ORDER — INSULIN LISPRO 100 [IU]/ML
0-7 INJECTION, SOLUTION INTRAVENOUS; SUBCUTANEOUS AS NEEDED
Status: DISCONTINUED | OUTPATIENT
Start: 2021-12-04 | End: 2021-12-07

## 2021-12-04 RX ORDER — POTASSIUM CHLORIDE 7.45 MG/ML
10 INJECTION INTRAVENOUS
Status: DISCONTINUED | OUTPATIENT
Start: 2021-12-04 | End: 2021-12-04 | Stop reason: SDUPTHER

## 2021-12-04 RX ADMIN — METRONIDAZOLE 500 MG: 500 TABLET ORAL at 22:55

## 2021-12-04 RX ADMIN — VANCOMYCIN HYDROCHLORIDE 1000 MG: 1 INJECTION, POWDER, LYOPHILIZED, FOR SOLUTION INTRAVENOUS at 07:49

## 2021-12-04 RX ADMIN — IOPAMIDOL 100 ML: 755 INJECTION, SOLUTION INTRAVENOUS at 06:24

## 2021-12-04 RX ADMIN — SODIUM CHLORIDE 100 ML/HR: 9 INJECTION, SOLUTION INTRAVENOUS at 10:30

## 2021-12-04 RX ADMIN — SODIUM CHLORIDE, PRESERVATIVE FREE 10 ML: 5 INJECTION INTRAVENOUS at 10:34

## 2021-12-04 RX ADMIN — SODIUM CHLORIDE 1000 ML: 9 INJECTION, SOLUTION INTRAVENOUS at 04:22

## 2021-12-04 RX ADMIN — POTASSIUM CHLORIDE 10 MEQ: 7.46 INJECTION, SOLUTION INTRAVENOUS at 07:27

## 2021-12-04 RX ADMIN — CEFEPIME HYDROCHLORIDE 2 G: 2 INJECTION, POWDER, FOR SOLUTION INTRAVENOUS at 07:48

## 2021-12-04 RX ADMIN — MAGNESIUM SULFATE HEPTAHYDRATE 2 G: 40 INJECTION, SOLUTION INTRAVENOUS at 13:08

## 2021-12-04 RX ADMIN — MAGNESIUM SULFATE HEPTAHYDRATE 2 G: 40 INJECTION, SOLUTION INTRAVENOUS at 10:31

## 2021-12-04 RX ADMIN — CEFEPIME HYDROCHLORIDE 2 G: 2 INJECTION, POWDER, FOR SOLUTION INTRAVENOUS at 15:17

## 2021-12-04 RX ADMIN — CEFTRIAXONE SODIUM 1 G: 1 INJECTION, POWDER, FOR SOLUTION INTRAMUSCULAR; INTRAVENOUS at 22:55

## 2021-12-04 RX ADMIN — SODIUM CHLORIDE, PRESERVATIVE FREE 10 ML: 5 INJECTION INTRAVENOUS at 22:55

## 2021-12-04 RX ADMIN — INSULIN LISPRO 3 UNITS: 100 INJECTION, SOLUTION INTRAVENOUS; SUBCUTANEOUS at 18:31

## 2021-12-04 NOTE — H&P
[facility] Medicine Services      Patient Name: Carmella Pelayo  : 1955  MRN: [id number]  Primary Care Physician:  Deana Wallace MD  Date of admission: 2021      Subjective      Chief Complaint: lower abdominal pain, left hip pain     History of Present Illness: Carmella Pelayo is a 66 y.o. female with left lower quadrant abdominal abscess s/p percutaneous drain placement 2021. Cultures grew group B streptococcus. She was discharged to rehab on 2021 on 2 weeks of IV rocephin and 2 weeks of oral flagyl. She saw Dr. Carpenter yesterday and was told that her antibiotics were going to change as yesterday was supposed to be her last day of IV rocephin per her report. She did not have any antibiotics 12/3/2021. She was discharged from rehab to home yesterday 12/3/2021. She returns to the ED 2021 with complaints of lower abdominal pain and left hip pain. She stated her left lower abdominal pain returned a few days ago. She feels pressure like a seat belt is wrapped around her left lower abdomen. Her output from her percutaneous drain has not increased or changed in color. She has left hip, groin, and leg pain. Her left leg is swollen. The pain is a severe ache that worsens with standing. She can barely put pressure on her leg and is unable to walk. She denied any fever, nausea, vomiting, or diarrhea. She has had decreased oral intake because nothing tastes good since being on antibiotics. She did have some hematuria this morning. She called EMS to take her to the ED.     In the ED patient had CT abdomen that showed evolving left peritoneal/retroperitoneal abscess and soft tissue phlegmon.  Surgical drain is present.  Larger components of this abscess collection appear significantly decreased compared to prior.  Small irregular lobulations in the left lower quadrant and along the left peritoneal wall persist and are not included communication with the drain.   Redemonstrated soft tissue defect and skin thickening in the perineum compatible with known vulvar cancer.  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  New enhancing crescentic fluid collection posterior to the greater trochanter which could reflect bursitis versus abscess.  Permeative changes in the left iliac wing and left ischium concerning for active bony destruction.  Given adjacent soft tissue infection, findings are concerning for osteomyelitis in this location.  WBC normal, afebrile, tachycardic low 100s, not hypotensive.  CMP showed potassium 2.3.  She was started on IV cefepime and vancomycin. She was admitted for further treatment of left hip abscess, possible osteomyelitis of left iliac wing and ischium. ID will be consulted.    She has recurrent vulvar cancer s/p vulvectomy, radiation, and chemotherapy no longer a hospice patient but not receiving any current treatment for her cancer. She has chronic perirectal wounds from previous vulvectomy surgery 2019.       Review of Systems   HENT: Negative.    Eyes: Negative.    Cardiovascular: Negative.    Respiratory: Negative.    Endocrine: Negative.    Hematologic/Lymphatic: Negative.    Skin: Negative.    Musculoskeletal: Positive for joint pain.   Gastrointestinal: Positive for abdominal pain.   Genitourinary: Positive for hematuria.   Neurological: Positive for weakness.   Psychiatric/Behavioral: Negative.    Allergic/Immunologic: Negative.    All other systems reviewed and are negative.       Personal History     Past Medical History:   Diagnosis Date   • Diabetes mellitus (HCC)    • Vulva cancer (HCC)        Past Surgical History:   Procedure Laterality Date   • VULVECTOMY         Family History:  Family hx reviewed and not contributory    Social History:  reports that she has never smoked. She has never used smokeless tobacco. She  "reports previous alcohol use. She reports previous drug use.    Home Medications:  Prior to Admission Medications     Prescriptions Last Dose Informant Patient Reported? Taking?    amoxicillin-clavulanate (Augmentin) 875-125 MG per tablet   No No    Take 1 tablet by mouth 2 (Two) Times a Day.    BD Pen Needle Lorelei U/F 32G X 4 MM misc   Yes No    3 (Three) Times a Day.    folic acid (FOLVITE) 1 MG tablet   No No    Take 1 tablet by mouth Daily for 90 days.    HYDROcodone-acetaminophen (NORCO)  MG per tablet   No No    Take 1 tablet by mouth Every 6 (Six) Hours As Needed for Moderate Pain .    ibuprofen (ADVIL,MOTRIN) 800 MG tablet   Yes No    Take 800 mg by mouth Every 8 (Eight) Hours As Needed for Mild Pain .    insulin lispro (ADMELOG) 100 UNIT/ML injection   No No    Inject 0-7 Units under the skin into the appropriate area as directed 3 (Three) Times a Day Before Meals.    Lantus SoloStar 100 UNIT/ML injection pen   Yes No    INJECT 15 UNITS SUBCUTANEOUSLY AT BEDTIME    loperamide (IMODIUM) 2 MG capsule   Yes No    TAKE ONE CAPSULE BY MOUTH FOUR TIMES DAILY AS NEEDED    metroNIDAZOLE (FLAGYL) 500 MG tablet   No No    Take 1 tablet by mouth Every 8 (Eight) Hours for 35 doses. Indications: Infection Within the Abdomen    ondansetron ODT (ZOFRAN-ODT) 8 MG disintegrating tablet   No No    Place 1 tablet on the tongue Every 8 (Eight) Hours As Needed for Nausea or Vomiting.    Zinc Oxide (Boudreauxs Butt Paste) 16 % ointment   No No    Apply  topically 2 (Two) Times a Day.            Allergies:  Allergies   Allergen Reactions   • Meperidine Palpitations, Anxiety and Nausea And Vomiting     Other reaction(s): \"feel like I am going to pass out\", Feel like I am going to have panic attack, Irregular heart beat, Nausea     • Morphine Nausea And Vomiting, Anxiety and Palpitations       Objective      Vitals:        Physical Exam  Vitals and nursing note reviewed.   HENT:      Head: Normocephalic and atraumatic.   Eyes: "      Extraocular Movements: Extraocular movements intact.      Pupils: Pupils are equal, round, and reactive to light.   Cardiovascular:      Rate and Rhythm: Regular rhythm. Tachycardia present.      Pulses: Normal pulses.      Heart sounds: Normal heart sounds.   Pulmonary:      Effort: Pulmonary effort is normal.      Breath sounds: Normal breath sounds.   Abdominal:      General: Bowel sounds are normal.      Palpations: Abdomen is soft.      Tenderness: There is abdominal tenderness in the left lower quadrant.      Comments: Left sided percutaneous drain connected to KATIE   Musculoskeletal:      Cervical back: Normal range of motion.      Left hip: Tenderness present. Decreased range of motion. Decreased strength.      Left upper leg: Swelling present.      Comments: Decreased ROM of left leg, tenderness of the left groin, swelling of left upper leg compared to right, good peripheral pulses    Skin:     General: Skin is warm.   Neurological:      Mental Status: She is alert and oriented to person, place, and time.   Psychiatric:         Mood and Affect: Mood normal.         Behavior: Behavior normal.          Result Review    Result Review:  I have personally reviewed the results from the time of this admission to 12/13/2021 08:15 EST and agree with these findings:  [x]  Laboratory  []  Microbiology  [x]  Radiology  []  EKG/Telemetry   []  Cardiology/Vascular   []  Pathology  [x]  Old records  []  Other:    Impression:    1. Evolving left peritoneal/retroperitoneal abscess and soft tissue phlegmon. A surgical drain is present. The larger components of this abscess collection appear significantly decreased compared to prior. Some small irregular lobulations in the left    lower quadrant and along the left peritoneal wall persist and are not included communication with the drain.   2. Redemonstrated soft tissue defect and skin thickening in the perineum compatible with known vulvar cancer. There is an enhancing  "heterogeneous fluid tracking extending from the anterior wall of the lower rectum to the perineum which could reflect a    rectocutaneous fistula.   3. New enhancing crescentic fluid collection posterior to the greater trochanter which could reflect bursitis versus abscess.   4. Permeative changes in the left iliac wing and left ischium concerning for active bony destruction. Given adjacent soft tissue infection, findings are concerning for osteomyelitis in this location.       Electronically signed by:  Aman Martínez M.D.     12/4/2021 4:48 AM           Assessment/Plan        Active Hospital Problems:  Active Hospital Problems    Diagnosis    • **Abscess of left hip    • Moderate malnutrition (CMS/HCC)    • Intra-abdominal abscess (HCC)    • Osteomyelitis of left side of pelvis (HCC)    • Left hip pain    • Hypokalemia    • Hypomagnesemia    • Malignant neoplasm of vulva (HCC)      Squamous cell carcinoma     • Left lower quadrant abdominal pain    • Anemia    • Type 2 diabetes mellitus (HCC)      Patient no longer taking metformin d/t insurance issues.  Patient no longer taking metformin d/t insurance issues.       Plan:     Left hip pain   Suspected abscess of the left hip/greater trochanter  Osteomyelitis left iliac wing and left ischium  -CT abd/pelvis: \"New enhancing crescentic fluid collection posterior to the greater trochanter which could reflect bursitis versus abscess.  Permeative changes in the left iliac wing and left ischium concerning for active bony destruction.  Given adjacent soft tissue infection, findings are concerning for osteomyelitis in this location.\"  -WBC normal, afebrile, mildly tachycardic, not hypotensive  -blood cultures ordered  -IV cefepime and vancomycin started in ED  -consult ID and ortho for further recommendations     Left lower quadrant abdominal abscess status post percutaneous drain placement 11/18/2021  CT abd/pelvis: \"evolving left peritoneal/retroperitoneal abscess and " "soft tissue phlegmon.  Surgical drain is present.  Larger components of this abscess collection appear significantly decreased compared to prior.  Small irregular lobulations in the left lower quadrant and along the left peritoneal wall persist and are not included communication with the drain.  Redemonstrated soft tissue defect and skin thickening in the perineum compatible with known vulvar cancer.  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 Carpenter's office  -pt was discharged on 11/21/2021 to rehab to complete 2 weeks of IV rocephin and oral flagyl after her fluid culture grew group B streptococcus. She reported she did not have her last day of IV rocephin 12/3 but did complete the oral flagyl.   -consult general surgery/Dr. Carpenter for further recommendations    Hypokalemia  Hypomagnesemia   -K 2.3  -Mg 1.3  -Replacement protocol    Vulvar cancer s/p vulvectomy, radiation, and chemotherapy   -no longer a hospice patient but not receiving any current treatment for her cancer  -chronic perirectal wounds from previous vulvectomy surgery 2019.     DM Type II  -glucose 154  -pt's last hgb a1c was 10  -add SSI AC/HS      DVT prophylaxis:  Mechanical DVT prophylaxis orders are present.    CODE STATUS:    Level Of Support Discussed With: Patient  Code Status (Patient has no pulse and is not breathing): CPR (Attempt to Resuscitate)  Medical Interventions (Patient has pulse or is breathing): Full Support    Admission Status:  I believe this patient meets inpatient status.    I discussed the patient's findings and my recommendations with patient.    This patient has been examined wearing appropriate Personal Protective Equipment. 12/13/21      Signature: Electronically signed by MANISH Wheatley, 12/04/21, 9:11 AM EST.      Hospitalist " Physician Assessment/Plan    History  HPI reviewed with the patient   66 y.o. female with left lower quadrant abdominal abscess s/p percutaneous drain placement 11/18/2021. Cultures grew group B streptococcus. She was discharged to rehab on 11/21/2021 on 2 weeks of IV rocephin and 2 weeks of oral flagyl. She saw Dr. Carpenter yesterday and was told that her antibiotics were going to change as yesterday was supposed to be her last day of IV rocephin per her report. She did not have any antibiotics 12/3/2021. She was discharged from rehab to home yesterday 12/3/2021. She returns to the ED 12/4/2021 with complaints of lower abdominal pain and left hip pain. She stated her left lower abdominal pain returned a few days ago. She feels pressure like a seat belt is wrapped around her left lower abdomen. Her output from her percutaneous drain has not increased or changed in color. She has left hip, groin, and leg pain. Her left leg is swollen. The pain is a severe ache that worsens with standing. She can barely put pressure on her leg and is unable to walk. She denied any fever, nausea, vomiting, or diarrhea. She has had decreased oral intake because nothing tastes good since being on antibiotics. She did have some hematuria this morning. She called EMS to take her to the ED.     Exam  No external collection or swelling noted but she has mild degree of soft tissue edema  Abdomen is soft and there is a drain on the left lower quadrant with cloudy aspirate.  Genitourinary evaluation deferred  She seems to have chronic fistulization.  On CT scan      Medical Decision Making:  Agree with antibiotic plan and consultation  Recheck BMP    Attending Physician Attestation    For this patient encounter, I have reviewed the mid-level provider documentation, medical decision making and treatment plan, and I have personally spent time with the patient.  All procedures were done by me, and/or all procedures were performed by the mid-level  under my supervision.

## 2021-12-04 NOTE — ED NOTES
Pt updated that she is marked to go for cat scan and should be taken there as soon as able.      Divine Guillermo RN  12/04/21 0550

## 2021-12-04 NOTE — ED PROVIDER NOTES
"Subjective   Chief complaint: Abdominal pain    66-year-old female presents with abdominal pain.  Patient states she was recently in the hospital and had an abdominal abscess drained.  She has a drain in place at this time.  She states over the past couple days she has noticed some increasing pain to her lower abdomen.  She describes it as a pressure sensation.  She denies any vomiting or diarrhea.  She has had no fever.  She denies any alleviating or exacerbating factors.  She also states she was treated for a urinary tract infection and she noticed that there was some blood in her urine today.      History provided by:  Patient      Review of Systems   Constitutional: Negative for fever.   HENT: Negative for congestion and sore throat.    Eyes: Negative for redness.   Respiratory: Negative for cough and shortness of breath.    Cardiovascular: Negative for chest pain.   Gastrointestinal: Positive for abdominal pain. Negative for diarrhea and vomiting.   Genitourinary: Positive for hematuria. Negative for dysuria.   Musculoskeletal: Negative for back pain.   Skin: Negative for rash.   Neurological: Negative for dizziness and headaches.   Psychiatric/Behavioral: Negative for confusion.       Past Medical History:   Diagnosis Date   • Diabetes mellitus (HCC)    • Vulva cancer (HCC)        Allergies   Allergen Reactions   • Meperidine Palpitations, Anxiety and Nausea And Vomiting     Other reaction(s): \"feel like I am going to pass out\", Feel like I am going to have panic attack, Irregular heart beat, Nausea         Past Surgical History:   Procedure Laterality Date   • VULVECTOMY         No family history on file.    Social History     Socioeconomic History   • Marital status: Single   Tobacco Use   • Smoking status: Never Smoker   • Smokeless tobacco: Never Used   Substance and Sexual Activity   • Alcohol use: Not Currently   • Drug use: Not Currently   • Sexual activity: Not Currently       /78   Pulse 104   " "Temp 98.2 °F (36.8 °C)   Resp 18   Ht 154.9 cm (61\")   Wt 55 kg (121 lb 4.1 oz)   SpO2 98%   BMI 22.91 kg/m²       Objective   Physical Exam  Vitals and nursing note reviewed.   Constitutional:       Appearance: She is well-developed.   HENT:      Head: Normocephalic and atraumatic.   Eyes:      Pupils: Pupils are equal, round, and reactive to light.   Cardiovascular:      Rate and Rhythm: Normal rate and regular rhythm.      Heart sounds: Normal heart sounds.   Pulmonary:      Effort: Pulmonary effort is normal. No respiratory distress.      Breath sounds: Normal breath sounds.   Abdominal:      General: Bowel sounds are normal.      Palpations: Abdomen is soft.      Tenderness: There is abdominal tenderness in the right lower quadrant, suprapubic area and left lower quadrant.      Comments: There is a KATIE drain in place to the left lower abdomen with purulent material in the reservoir   Musculoskeletal:         General: Normal range of motion.      Cervical back: Normal range of motion and neck supple.   Skin:     General: Skin is warm and dry.   Neurological:      Mental Status: She is alert and oriented to person, place, and time.         Procedures           ED Course      Results for orders placed or performed during the hospital encounter of 12/04/21   Comprehensive Metabolic Panel    Specimen: Blood   Result Value Ref Range    Glucose 154 (H) 65 - 99 mg/dL    BUN 5 (L) 8 - 23 mg/dL    Creatinine 0.43 (L) 0.57 - 1.00 mg/dL    Sodium 138 136 - 145 mmol/L    Potassium 2.3 (C) 3.5 - 5.2 mmol/L    Chloride 94 (L) 98 - 107 mmol/L    CO2 31.0 (H) 22.0 - 29.0 mmol/L    Calcium 8.6 8.6 - 10.5 mg/dL    Total Protein 7.2 6.0 - 8.5 g/dL    Albumin 2.70 (L) 3.50 - 5.20 g/dL    ALT (SGPT) 8 1 - 33 U/L    AST (SGOT) 7 1 - 32 U/L    Alkaline Phosphatase 353 (H) 39 - 117 U/L    Total Bilirubin 0.6 0.0 - 1.2 mg/dL    eGFR Non African Amer 147 >60 mL/min/1.73    Globulin 4.5 gm/dL    A/G Ratio 0.6 g/dL    BUN/Creatinine " Ratio 11.6 7.0 - 25.0    Anion Gap 13.0 5.0 - 15.0 mmol/L   Lipase    Specimen: Blood   Result Value Ref Range    Lipase 13 13 - 60 U/L   Urinalysis With Culture If Indicated - Urine, Clean Catch    Specimen: Urine, Clean Catch   Result Value Ref Range    Color, UA Yellow Yellow, Straw    Appearance, UA Clear Clear    pH, UA 7.0 5.0 - 8.0    Specific Gravity, UA 1.013 1.005 - 1.030    Glucose, UA Negative Negative    Ketones, UA Negative Negative    Bilirubin, UA Negative Negative    Blood, UA Small (1+) (A) Negative    Protein, UA Negative Negative    Leuk Esterase, UA Small (1+) (A) Negative    Nitrite, UA Negative Negative    Urobilinogen, UA 0.2 E.U./dL 0.2 - 1.0 E.U./dL   CBC Auto Differential    Specimen: Blood   Result Value Ref Range    WBC 8.60 3.40 - 10.80 10*3/mm3    RBC 3.88 3.77 - 5.28 10*6/mm3    Hemoglobin 9.7 (L) 12.0 - 15.9 g/dL    Hematocrit 29.7 (L) 34.0 - 46.6 %    MCV 76.7 (L) 79.0 - 97.0 fL    MCH 25.0 (L) 26.6 - 33.0 pg    MCHC 32.6 31.5 - 35.7 g/dL    RDW 21.4 (H) 12.3 - 15.4 %    RDW-SD 56.9 (H) 37.0 - 54.0 fl    MPV 7.2 6.0 - 12.0 fL    Platelets 312 140 - 450 10*3/mm3    Neutrophil % 73.3 42.7 - 76.0 %    Lymphocyte % 13.9 (L) 19.6 - 45.3 %    Monocyte % 9.2 5.0 - 12.0 %    Eosinophil % 2.3 0.3 - 6.2 %    Basophil % 1.3 0.0 - 1.5 %    Neutrophils, Absolute 6.30 1.70 - 7.00 10*3/mm3    Lymphocytes, Absolute 1.20 0.70 - 3.10 10*3/mm3    Monocytes, Absolute 0.80 0.10 - 0.90 10*3/mm3    Eosinophils, Absolute 0.20 0.00 - 0.40 10*3/mm3    Basophils, Absolute 0.10 0.00 - 0.20 10*3/mm3    nRBC 0.1 0.0 - 0.2 /100 WBC   Urinalysis, Microscopic Only - Urine, Clean Catch    Specimen: Urine, Clean Catch   Result Value Ref Range    RBC, UA 0-2 (A) None Seen /HPF    WBC, UA 6-12 (A) None Seen /HPF    Bacteria, UA Trace (A) None Seen /HPF    Squamous Epithelial Cells, UA 0-2 None Seen, 0-2 /HPF    Yeast, UA Small/1+ Budding Yeast None Seen /HPF    Hyaline Casts, UA 0-2 None Seen /LPF    Methodology Manual  Light Microscopy    Magnesium    Specimen: Blood   Result Value Ref Range    Magnesium 1.3 (L) 1.6 - 2.4 mg/dL     CT Abdomen Pelvis With Contrast   Final Result   Impression:    1. Evolving left peritoneal/retroperitoneal abscess and soft tissue phlegmon. A surgical drain is present. The larger components of this abscess collection appear significantly decreased compared to prior. Some small irregular lobulations in the left    lower quadrant and along the left peritoneal wall persist and are not included communication with the drain.   2. Redemonstrated soft tissue defect and skin thickening in the perineum compatible with known vulvar cancer. There is an enhancing heterogeneous fluid tracking extending from the anterior wall of the lower rectum to the perineum which could reflect a    rectocutaneous fistula.   3. New enhancing crescentic fluid collection posterior to the greater trochanter which could reflect bursitis versus abscess.   4. Permeative changes in the left iliac wing and left ischium concerning for active bony destruction. Given adjacent soft tissue infection, findings are concerning for osteomyelitis in this location.      Electronically signed by:  Aman Martínez M.D.     12/4/2021 4:48 AM                                                   MDM   Patient had the above evaluation.  Results were discussed with the patient.  Labs significant for a potassium of 2.3.  Patient was started on IV potassium replacement.  CT of the abdomen and pelvis is showing evolving abscess as detailed above.  There is now changes in the left iliac wing and left ischium concerning for osteomyelitis.  Patient was started on cefepime and vancomycin.  I discussed with the nurse practitioner on-call for the hospitalist and the patient will be admitted for further evaluation and management.      Final diagnoses:   Intra-abdominal abscess (HCC)   Osteomyelitis of pelvis (HCC)   Hypokalemia       ED Disposition  ED Disposition      ED Disposition Condition Comment    Decision to Admit  Level of Care: Telemetry [5]   Admitting Physician: JOHN BUCK YOUSSIEF [[id number]]            No follow-up provider specified.       Medication List      ASK your doctor about these medications    metroNIDAZOLE 500 MG tablet  Commonly known as: FLAGYL  Take 1 tablet by mouth Every 8 (Eight) Hours for 35 doses. Indications: Infection Within the Abdomen  Ask about: Should I take this medication?             Memo Monreal MD  12/04/21 0719

## 2021-12-04 NOTE — ED NOTES
Pt had tumor removal 2 weeks ago. Co some tightness in lower right abdomen and some dehydration and dysuria.      Divine Guillermo RN  12/04/21 [id number]

## 2021-12-04 NOTE — CONSULTS
General Surgery Consult Note      Name: Carmella Pelayo ADMIT: 2021   : 1955  PCP: Deana Wallace MD    MRN: [id number] LOS: 0 days   AGE/SEX: 66 y.o. female  ROOM:    [facility]      Patient Care Team:  Deana Wallace MD as PCP - General (Family Medicine)  Chief Complaint   Patient presents with   • Abdominal Pain       Subjective   66-year-old lady who our group met a couple weeks ago where she was found to have a large left lower quadrant abscess likely related to her vulvar cancer who underwent percutaneous drainage and discharge to nursing home with IV antibiotics.  She presented to hospital today with worsening lower abdominal pain.  She says it feels like a deep discomfort bandlike pain around her abdomen.  A little bit worse over the last few days but not near as bad as her last hospital admission.  Denies any fevers or chills.  Says she is tolerating a diet having regular bowel function.  She is also describing worsening left hip pain especially when standing.  Past Medical History:   Diagnosis Date   • Diabetes mellitus (HCC)    • Vulva cancer (HCC)      Past Surgical History:   Procedure Laterality Date   • VULVECTOMY       No family history on file.  Social History     Tobacco Use   • Smoking status: Never Smoker   • Smokeless tobacco: Never Used   Substance Use Topics   • Alcohol use: Not Currently   • Drug use: Not Currently     (Not in a hospital admission)    cefepime, 2 g, Intravenous, Q8H  insulin lispro, 0-7 Units, Subcutaneous, Q6H  sodium chloride, 10 mL, Intravenous, Q12H  vancomycin, 15 mg/kg, Intravenous, Q12H      Pharmacy to dose vancomycin,   sodium chloride, 100 mL/hr, Last Rate: 100 mL/hr (21 1030)      •  acetaminophen **OR** acetaminophen **OR** acetaminophen  •  aluminum-magnesium hydroxide-simethicone  •  dextrose  •  dextrose  •  glucagon (human recombinant)  •  insulin lispro **AND** insulin lispro  •  magnesium sulfate **OR** magnesium  sulfate **OR** magnesium sulfate  •  melatonin  •  ondansetron **OR** ondansetron  •  Pharmacy to dose vancomycin  •  potassium chloride **OR** potassium chloride **OR** potassium chloride  •  [COMPLETED] Insert peripheral IV **AND** sodium chloride  •  sodium chloride  Meperidine    Review of Systems   Constitutional: Positive for activity change. Negative for chills and fever.   HENT: Negative for sore throat and trouble swallowing.    Eyes: Negative for blurred vision and double vision.   Respiratory: Negative for cough and shortness of breath.    Cardiovascular: Positive for leg swelling. Negative for chest pain.   Gastrointestinal: Positive for abdominal pain. Negative for abdominal distention, blood in stool, constipation, diarrhea, nausea and vomiting.   Genitourinary: Positive for hematuria. Negative for dysuria.   Skin: Negative for rash and bruise.   Neurological: Negative for dizziness and confusion.   Psychiatric/Behavioral: Negative for agitation and depressed mood.        Objective     Vital Signs and Labs:  Vital Signs (range)  Temp:  [98.2 °F (36.8 °C)] 98.2 °F (36.8 °C)  Heart Rate:  [] 84  Resp:  [18] 18  BP: (113-149)/(70-90) 132/72    Physical Exam:  Physical Exam  Constitutional:       General: She is not in acute distress.     Appearance: She is well-developed. She is ill-appearing.   HENT:      Head: Normocephalic and atraumatic.   Eyes:      General: No scleral icterus.     Conjunctiva/sclera: Conjunctivae normal.   Neck:      Trachea: No tracheal deviation.   Cardiovascular:      Rate and Rhythm: Normal rate.      Pulses: Normal pulses.   Pulmonary:      Effort: Pulmonary effort is normal. No respiratory distress.   Abdominal:      General: There is no distension.      Palpations: Abdomen is soft.      Comments: Left lower quadrant KATIE drain with purulent discharge estimate 10 to 15 cc output in last 24 hours   Musculoskeletal:         General: Swelling and tenderness present.       Cervical back: Neck supple. No tenderness. No muscular tenderness.   Skin:     General: Skin is warm and dry.   Neurological:      General: No focal deficit present.      Mental Status: She is alert. Mental status is at baseline.   Psychiatric:         Mood and Affect: Mood normal.         Behavior: Behavior normal.         CBC    Results from last 7 days   Lab Units 12/04/21  0422   WBC 10*3/mm3 8.60   HEMOGLOBIN g/dL 9.7*   PLATELETS 10*3/mm3 312     BMP   Results from last 7 days   Lab Units 12/04/21  0422   SODIUM mmol/L 138   POTASSIUM mmol/L 2.3*   CHLORIDE mmol/L 94*   CO2 mmol/L 31.0*   BUN mg/dL 5*   CREATININE mg/dL 0.43*   GLUCOSE mg/dL 154*   MAGNESIUM mg/dL 1.3*     Radiology(recent) CT Abdomen Pelvis With Contrast    Result Date: 12/4/2021  Impression: 1. Evolving left peritoneal/retroperitoneal abscess and soft tissue phlegmon. A surgical drain is present. The larger components of this abscess collection appear significantly decreased compared to prior. Some small irregular lobulations in the left lower quadrant and along the left peritoneal wall persist and are not included communication with the drain. 2. Redemonstrated soft tissue defect and skin thickening in the perineum compatible with known vulvar cancer. 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 3. New enhancing crescentic fluid collection posterior to the greater trochanter which could reflect bursitis versus abscess. 4. Permeative changes in the left iliac wing and left ischium concerning for active bony destruction. Given adjacent soft tissue infection, findings are concerning for osteomyelitis in this location. Electronically signed by:  Aman Martínez M.D.  12/4/2021 4:48 AM      I reviewed the patient's new clinical results.  I reviewed the patient's new imaging results and agree with the  interpretation.    Assessment/Plan       Abscess of left hip    Malignant neoplasm of vulva (HCC)    Type 2 diabetes mellitus (HCC)    Left lower quadrant abdominal pain    Anemia    Intra-abdominal abscess (HCC)    Osteomyelitis of left side of pelvis (HCC)    Left hip pain    Hypokalemia    Hypomagnesemia      66 y.o. female with a left lower quadrant abscess unsure as the exact etiology potentially related to her cancer.  Seems to have worsening involvement of her left leg.  There is a fluid collection just superior to where the drain is that could be percutaneously drained.  I discussed her care with infectious disease.  I talked to the patient she seems agreeable to another percutaneous drain placement so we will go ahead and request interventional radiology to do that in the next couple of days.  However she has a history of being on hospice care and was discharged from hospice whenever she started to have treatment for this abscess and alternative to continuing treatment of what is likely going to be a chronic problem for her getting back into hospice may be possible.    For now is okay for diet from my standpoint I do not believe the drain  placement would happen until Monday or Tuesday.    If this is possible to coordinate as an outpatient and she wanted to go back to her nursing home as she has indicated she does it would be okay with me however the coordination and planning for this might be challenging      This note was created using Dragon Voice Recognition software.    Brian Mckeon MD  12/04/21  12:37 EST

## 2021-12-04 NOTE — PROGRESS NOTES
"Pharmacy Antimicrobial Dosing Service    Subjective:  Carmella Pelayo is a 66 y.o.female admitted with suspected abscess of the left hip, LLQ abdominal abscess. Pharmacy has been consulted to dose Vancomycin for possible osteomyelitis.        Assessment/Plan    1. Day #1 Vancomycin: Goal -600 mcg*h/mL. Vancomycin 1000 mg (~20 mg/kg ABW) IV once, followed by 750 mg (~15 mg/kg ABW) IV q12h. Obtain peak on 12/5 at 0100 and trough 12/5 at 0800.     2. Day #1 Cefepime: 2gm IV q8h for estCrCl > 60 mL/min.    Will continue to monitor drug levels, renal function, culture and sensitivities, and patient clinical status.       Objective:  Relevant clinical data and objective history reviewed:  154.9 cm (61\")   55 kg (121 lb 4.1 oz)   Ideal body weight: 47.8 kg (105 lb 6.1 oz)  Adjusted ideal body weight: 50.7 kg (111 lb 11.7 oz)  Body mass index is 22.91 kg/m².        Results from last 7 days   Lab Units 12/04/21  0422   CREATININE mg/dL 0.43*     Estimated Creatinine Clearance: 60.1 mL/min (A) (by C-G formula based on SCr of 0.43 mg/dL (L)).  No intake/output data recorded.    Results from last 7 days   Lab Units 12/04/21  0422   WBC 10*3/mm3 8.60     Temperature    12/04/21 0339   Temp: 98.2 °F (36.8 °C)     Baseline culture/source/susceptibility:  Microbiology Results (last 10 days)       Procedure Component Value - Date/Time    COVID PRE-OP / PRE-PROCEDURE SCREENING ORDER (NO ISOLATION) - Swab, Nasopharynx [[id number]]  (Normal) Collected: 12/04/21 0719    Lab Status: Final result Specimen: Swab from Nasopharynx Updated: 12/04/21 0814    Narrative:      The following orders were created for panel order COVID PRE-OP / PRE-PROCEDURE SCREENING ORDER (NO ISOLATION) - Swab, Nasopharynx.  Procedure                               Abnormality         Status                     ---------                               -----------         ------                     COVID-19,CEPHEID/ISELA/BD...[[id number]]  Normal              " Final result                 Please view results for these tests on the individual orders.    COVID-19,CEPHEID/ISELA/BDMAX,COR/TUNDE/PAD/KIMO IN-HOUSE(OR EMERGENT/ADD-ON),NP SWAB IN TRANSPORT MEDIA 3-4 HR TAT, RT-PCR - Swab, Nasopharynx [[id number]]  (Normal) Collected: 12/04/21 0719    Lab Status: Final result Specimen: Swab from Nasopharynx Updated: 12/04/21 0814     COVID19 Not Detected    Narrative:      Fact sheet for providers: https://www.fda.gov/media/482860/download     Fact sheet for patients: https://www.fda.gov/media/737119/download  Fact sheet for providers: https://www.fda.gov/media/382705/download     Fact sheet for patients: https://www.fda.gov/media/133389/download            Anti-Infectives (From admission, onward)      Ordered     Dose/Rate Route Frequency Start Stop    12/04/21 1133  vancomycin 750 mg in sodium chloride 0.9 % 250 mL IVPB        Ordering Provider: Renate Hammer APRN    15 mg/kg × 55 kg Intravenous Every 12 Hours 12/04/21 2100 12/11/21 2059    12/04/21 0908  cefepime 2 gm IVPB in 100 ml NS (MBP)        Ordering Provider: Renate Hammer APRN    2 g  200 mL/hr over 30 Minutes Intravenous Every 8 Hours 12/04/21 1600 12/11/21 1559    12/04/21 0908  Pharmacy to dose vancomycin        Ordering Provider: Renate Hammer APRN     Does not apply Continuous PRN 12/04/21 0908 12/11/21 0907    12/04/21 0718  vancomycin (VANCOCIN) 1,000 mg in sodium chloride 0.9 % 250 mL IVPB        Ordering Provider: Memo Monreal MD    20 mg/kg × 55 kg Intravenous Once 12/04/21 0720 12/04/21 0915    12/04/21 0718  cefepime 2 gm IVPB in 100 ml NS (MBP)        Ordering Provider: Memo Monreal MD    2 g  over 30 Minutes Intravenous Once 12/04/21 0720 12/04/21 0818            Andreina Conde, PharmD  12/04/21 11:34 EST

## 2021-12-04 NOTE — CONSULTS
"     Patient ID: Carmella Pelayo is a 66 y.o. female.    Chief Complaint:    Chief Complaint   Patient presents with   • Abdominal Pain       HPI:  This is a 66-year-old female with history of gynecological cancer.  She has had recent drain placement by general surgery for an abscess.  She returns with return of left abdominal pain and swelling.  Past Medical History:   Diagnosis Date   • Diabetes mellitus (HCC)    • Vulva cancer (HCC)        Past Surgical History:   Procedure Laterality Date   • VULVECTOMY         No family history on file.       Social History     Occupational History   • Not on file   Tobacco Use   • Smoking status: Never Smoker   • Smokeless tobacco: Never Used   Substance and Sexual Activity   • Alcohol use: Not Currently   • Drug use: Not Currently   • Sexual activity: Not Currently      Review of Systems   Cardiovascular: Negative for chest pain.   Musculoskeletal: Positive for arthralgias.       Objective:    /70 (BP Location: Left arm, Patient Position: Sitting)   Pulse 85   Temp 98.1 °F (36.7 °C) (Oral)   Resp 14   Ht 154.9 cm (61\")   Wt 55 kg (121 lb 4.1 oz)   SpO2 97%   BMI 22.91 kg/m²     Physical Examination:  Left hip and groin area demonstrates intact skin.  She has a drain in the left lower quadrant.  Hip flexion 90 internal extra rotation 50-60 with no significant groin pain.  Mild swelling of the left thigh compartments are soft Sharon negative  Sensory and motor exam are intact in all distributions. Dorsalis pedis and posterior tibialis pulses are palpable and capillary refill is less than two seconds to all digits.    Imaging:  CT scan demonstrates fluid collection left lower quadrant consistent with abscess reactive changes of the iliac wing concerning for possible osteomyelitis    Assessment:  Abdominal abscess possible osteomyelitis    Plan:  Abscess management per general surgery, I believe drain is planned.  IV antibiotics for possible osteomyelitis no " orthopedic surgery indicated  DVT prophylaxis is medically appropriate  Can follow as needed      Disclaimer: Please note that areas of this note were completed with computer voice recognition software.  Quite often unanticipated grammatical, syntax, homophones, and other interpretive errors are inadvertently transcribed by the computer software. Please excuse any errors that have escaped final proofreading.

## 2021-12-04 NOTE — CONSULTS
Infectious Diseases Consult Note    Referring Provider: Brandon Cruz *    Reason for Consultation: Abdominal abscess, possible osteomyelitis    Patient Care Team:  Deana Wallace MD as PCP - General (Family Medicine)    Chief complaint lower abdominal pain, left hip and leg pain    Subjective     The patient has been afebrile for the last 24 hours.  The patient is on room air, hemodynamically stable, and is tolerating antimicrobial therapy.    History of present illness:      This is a 66-year-old female who presents the hospital on 12/4/2021 with complaints of lower abdominal pain and left hip pain that radiates down to her leg.  Patient was just recently seen in earlier November 2021 for a very large abdominal abscess that was drained and she was sent on 2 weeks of IV Rocephin and p.o. Flagyl.  Patient finished her antibiotics on 12/2/2021 and then again noticing swelling in her left leg and continued pain.  She also admits to hematuria and anorexia.  Patient denies fever, chills, diaphoresis, shortness of breath, cough, other GI symptoms.  She continues to have issues with chronic perianal wounds.  Patient has a history of vulvar cancer which has recurred but apparently did not tolerate chemotherapy and at 1 point chose to go hospice.  Patient is no longer hospice since she is receiving treatment for the infection.    Patient is currently on room air and does not appear to be in acute distress.  Patient's been afebrile since admission.  Patient has a creatinine of 0.38, white blood cell count of 8.6, hemoglobin 9.7, platelets of 312.  C-reactive protein is 6.83.  Urinalysis was negative.  Blood cultures are pending in COVID-19 screen is negative.  Culture from the drain is pending.  CT the abdomen is shows the original normal abscess was significantly decreased there is now some small irregular lobulations of the left lower quadrant and along the left peritoneal wall do not communicate with the  drain.  There is some concern for possible retrocutaneous fistula.  There is a new fluid collection posterior to the greater trochanter could be bursitis versus abscess.  There is also some changes in the left iliac vein and  left ischium concerning for possible osteomyelitis.  Patient is currently on IV vancomycin and IV cefepime and has no known antibiotic allergies    Along with above-mentioned history patient has a history of diabetes.  She denies tobacco, alcohol, illicit drug abuse.        Review of Systems   Review of Systems   Constitutional: Negative.    HENT: Negative.    Eyes: Negative.    Respiratory: Negative.    Cardiovascular: Negative.    Gastrointestinal: Positive for abdominal pain.        Anorexia   Endocrine: Negative.    Genitourinary: Positive for hematuria.   Musculoskeletal: Negative.         Left leg pain and swelling   Skin: Positive for wound.   Neurological: Negative.    Psychiatric/Behavioral: Negative.    All other systems reviewed and are negative.      Medications  Medications Prior to Admission   Medication Sig Dispense Refill Last Dose   • [] metroNIDAZOLE (FLAGYL) 500 MG tablet Take 1 tablet by mouth Every 8 (Eight) Hours for 35 doses. Indications: Infection Within the Abdomen 35 tablet 0        History  Past Medical History:   Diagnosis Date   • Diabetes mellitus (HCC)    • Vulva cancer (HCC)      Past Surgical History:   Procedure Laterality Date   • VULVECTOMY         Family History  No family history on file.    Social History   reports that she has never smoked. She has never used smokeless tobacco. She reports previous alcohol use. She reports previous drug use.    Allergies  Meperidine and Morphine    Objective     Vital Signs   Vital Signs (last 24 hours)        0700   0659  0700   1759   Most Recent      Temp (°F)   98.2      98.1     98.1 (36.7)  1300    Heart Rate 104 -  128    82 -  119     85  1300    Resp   18      14     14   1300    /70 -  145/78    123/70 -  149/76     123/70 12/04 1300    SpO2 (%) 94 -  98    93 -  99     97 12/04 1300          Physical Exam:  Physical Exam  Vitals and nursing note reviewed.   Constitutional:       General: She is not in acute distress.     Appearance: Normal appearance. She is well-developed and normal weight. She is not diaphoretic.   HENT:      Head: Normocephalic and atraumatic.   Eyes:      General: No scleral icterus.     Extraocular Movements: Extraocular movements intact.      Conjunctiva/sclera: Conjunctivae normal.      Pupils: Pupils are equal, round, and reactive to light.   Cardiovascular:      Rate and Rhythm: Normal rate and regular rhythm.      Heart sounds: Normal heart sounds, S1 normal and S2 normal. No murmur heard.      Pulmonary:      Effort: Pulmonary effort is normal. No respiratory distress.      Breath sounds: Normal breath sounds. No stridor. No wheezing or rales.   Chest:      Chest wall: No tenderness.   Abdominal:      General: Bowel sounds are normal. There is no distension.      Palpations: Abdomen is soft. There is no mass.      Tenderness: There is abdominal tenderness. There is no guarding.      Comments: Abdominal pain especially toward the left    KATIE drain with purulent drainage   Genitourinary:     Comments: Chronic perirectal wounds  Musculoskeletal:         General: No swelling, tenderness or deformity. Normal range of motion.      Cervical back: Neck supple.      Left lower leg: Edema present.      Comments: Tenderness in the left hip and left groin-decreased range of motion and some swelling in the upper left leg   Skin:     General: Skin is warm and dry.      Coloration: Skin is not pale.      Findings: No bruising, erythema or rash.   Neurological:      General: No focal deficit present.      Mental Status: She is alert and oriented to person, place, and time. Mental status is at baseline.      Cranial Nerves: No cranial nerve deficit.   Psychiatric:          Mood and Affect: Mood normal.         Microbiology  Microbiology Results (last 10 days)     Procedure Component Value - Date/Time    COVID PRE-OP / PRE-PROCEDURE SCREENING ORDER (NO ISOLATION) - Swab, Nasopharynx [[id number]]  (Normal) Collected: 12/04/21 0719    Lab Status: Final result Specimen: Swab from Nasopharynx Updated: 12/04/21 0814    Narrative:      The following orders were created for panel order COVID PRE-OP / PRE-PROCEDURE SCREENING ORDER (NO ISOLATION) - Swab, Nasopharynx.  Procedure                               Abnormality         Status                     ---------                               -----------         ------                     COVID-19,CEPHEID/ISELA/BD...[[id number]]  Normal              Final result                 Please view results for these tests on the individual orders.    COVID-19,CEPHEID/ISELA/BDMAX,COR/TUNDE/PAD/KIMO IN-HOUSE(OR EMERGENT/ADD-ON),NP SWAB IN TRANSPORT MEDIA 3-4 HR TAT, RT-PCR - Swab, Nasopharynx [[id number]]  (Normal) Collected: 12/04/21 0719    Lab Status: Final result Specimen: Swab from Nasopharynx Updated: 12/04/21 0814     COVID19 Not Detected    Narrative:      Fact sheet for providers: https://www.fda.gov/media/597978/download     Fact sheet for patients: https://www.fda.gov/media/426421/download  Fact sheet for providers: https://www.fda.gov/media/895672/download     Fact sheet for patients: https://www.fda.gov/media/720944/download          Laboratory  Results from last 7 days   Lab Units 12/04/21  0422   WBC 10*3/mm3 8.60   HEMOGLOBIN g/dL 9.7*   HEMATOCRIT % 29.7*   PLATELETS 10*3/mm3 312     Results from last 7 days   Lab Units 12/04/21  1335   SODIUM mmol/L 141   POTASSIUM mmol/L 2.5*   CHLORIDE mmol/L 99   CO2 mmol/L 31.0*   BUN mg/dL 5*   CREATININE mg/dL 0.38*   GLUCOSE mg/dL 140*   CALCIUM mg/dL 7.9*     Results from last 7 days   Lab Units 12/04/21  1335   SODIUM mmol/L 141   POTASSIUM mmol/L 2.5*   CHLORIDE mmol/L 99   CO2 mmol/L 31.0*   BUN  mg/dL 5*   CREATININE mg/dL 0.38*   GLUCOSE mg/dL 140*   CALCIUM mg/dL 7.9*                   Radiology  Imaging Results (Last 72 Hours)     Procedure Component Value Units Date/Time    CT Abdomen Pelvis With Contrast [[id number]] Collected: 12/04/21 0638     Updated: 12/04/21 0649    Narrative:      CT OF THE ABDOMEN AND PELVIS WITH CONTRAST    Clinical indication: Abdominal pain.    Comparison: 11/18/2021.    Procedure: 100 cc Isovue-370 were administered intravenously without incident. CT images of the abdomen and pelvis were obtained.  CT dose lowering techniques were used, to include: automated exposure control, adjustment for patient size, and or use of   iterative reconstruction.    Findings:     Lung Bases: Lung bases are clear. No pleural effusion. Heart size is normal without pericardial effusion.    Liver and biliary system: The liver is normal in size and configuration without focal lesion. No intra or extrahepatic biliary ductal dilatation is noted. The gallbladder is normal without stones, wall thickening or adjacent fluid.     Pancreas: The pancreas is unremarkable.     Spleen: The spleen is normal.    Adrenals: The adrenal glands are within normal limits.     Kidneys: The kidneys are symmetric in size and enhancement and without hydronephrosis.    Gastrointestinal: The stomach and scattered loops of small and large bowel have a nonobstructive pattern. No focal mucosal lesion or inflammatory changes are seen. The appendix is normal in the right lower quadrant.     Mesentery and retroperitoneum: No mesenteric or retroperitoneal adenopathy. There is a percutaneous drain in the left lower abdomen and pelvis. Significant interval decrease in size of a lobulated fluid collection seen previously in this location.   Irregular peripheral enhancing fluid collections in the left lower quadrant paracolic gutter are present but are not included communication with the drain itself. There is a residual crescentic  collection on the left lateral peritoneal wall measuring 1.5   x 6.8 cm. Ill-defined soft tissue thickening is seen anterior to the left iliac wing.    Pelvis: The urinary bladder is moderately distended with a smooth contour. A peripherally enhancing heterogeneous collection appears to extend from the anterior wall of the lower rectum to the perineum measuring approximately 1.8 cm thick. No deep pelvic   or inguinal adenopathy.     Reproductive: Redemonstrated soft tissue thickening and irregular enhancement is seen in the perineum compatible with known vulvar cancer.    Body wall: Crescentic fluid collection is seen posteriorly to the greater trochanter measuring 1.8 x 4.9 cm.    Bones: There are cortical permeative changes in the anterior left iliac wing and about the anterior aspect of the left acetabulum. These appear new from prior. No fracture.      Impression:      Impression:   1. Evolving left peritoneal/retroperitoneal abscess and soft tissue phlegmon. A surgical drain is present. The larger components of this abscess collection appear significantly decreased compared to prior. Some small irregular lobulations in the left   lower quadrant and along the left peritoneal wall persist and are not included communication with the drain.  2. Redemonstrated soft tissue defect and skin thickening in the perineum compatible with known vulvar cancer. There is an enhancing heterogeneous fluid tracking extending from the anterior wall of the lower rectum to the perineum which could reflect a   rectocutaneous fistula.  3. New enhancing crescentic fluid collection posterior to the greater trochanter which could reflect bursitis versus abscess.  4. Permeative changes in the left iliac wing and left ischium concerning for active bony destruction. Given adjacent soft tissue infection, findings are concerning for osteomyelitis in this location.    Electronically signed by:  Aman Martínez M.D.    12/4/2021 4:48 AM           Cardiology      Results Review:  I have reviewed all clinical data, test, lab, and imaging results.       Schedule Meds  cefepime, 2 g, Intravenous, Q8H  insulin lispro, 0-7 Units, Subcutaneous, Q6H  sodium chloride, 10 mL, Intravenous, Q12H  vancomycin, 15 mg/kg, Intravenous, Q12H        Infusion Meds  Pharmacy to dose vancomycin,   sodium chloride, 100 mL/hr, Last Rate: 100 mL/hr (12/04/21 1030)        PRN Meds  •  acetaminophen **OR** acetaminophen **OR** acetaminophen  •  aluminum-magnesium hydroxide-simethicone  •  Calcium Gluconate-NaCl **AND** calcium gluconate **AND** Calcium, Ionized  •  dextrose  •  dextrose  •  glucagon (human recombinant)  •  insulin lispro **AND** insulin lispro  •  magnesium sulfate **OR** magnesium sulfate **OR** magnesium sulfate  •  melatonin  •  ondansetron **OR** ondansetron  •  Pharmacy to dose vancomycin  •  potassium & sodium phosphates **OR** potassium & sodium phosphates  •  potassium chloride **OR** potassium chloride **OR** potassium chloride  •  [COMPLETED] Insert peripheral IV **AND** sodium chloride  •  sodium chloride      Assessment/Plan         Assessment     Recurrent abdominal abscesses.  The current CT the abdomen is shows the original normal abscess was significantly decreased but there are no new fluid collections along the  left lower quadrant and along the left peritoneal wall that do not communicate with the drain.  There is some concern for possible retrocutaneous fistula.  There is a new fluid collection posterior to the greater trochanter could be bursitis versus abscess.    -Patient has continued lower abdominal pain    Concern for possible new finding of osteomyelitis versus metastatic disease  CT also showed some changes in the left iliac vein and  left ischium concerning for possible osteomyelitis.    -Patient complains about recent pain to her left hip and left leg with some subsequent swelling    Recent history of large left lower quadrant  abscess.    Abscess was drained on 11/18/2021 and 400 mL of purulent drainage was removed.  Cultures are showing heavy growth of group B streptococcus.  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  However patient does have some chronic perirectal wounds  -Patient completed 2 weeks of IV Rocephin and p.o. 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   Fairly significant denuded areas with some drainage.  -Wound cultures from 11/20/2021 also grew group B streptococcus     Type 2 diabetes-uncontrolled patient's A1c is 10     History of vulvar cancer with a vulvectomy and radiation and chemotherapy treatments approximately 2 years ago.  Patient has had no recent treatments and-she reports that the cancer has returned and she cannot tolerate chemotherapy  -Patient was hospice but is currently not with that service and since she is getting treatment for infections  -Patient still has a port    COVID-19 screen was negative     Plan    Case discussed with general surgery who does not feel that she is a surgical candidate but will discuss the case with IR to see if they are able to drain any of the new abscesses.  Case also discussed with orthopedic surgery who did not feel like there is any surgical treatment he could offer  Will be very hard to determine if the new findings in the left hip area are osteomyelitis versus metastatic disease without a biopsy  We can offer another 4 weeks of treatment for the abscesses and possible osteomyelitis but it was explained in detail to the patient and her family member at bedside that is not likely definitive treatment with the overall metastatic disease    Discontinue IV vancomycin and IV cefepime  Start IV Rocephin 2 g every 24 for 4 weeks  Start p.o. Flagyl 500 mg 3 times daily for 4 weeks  Waiting on current drain cultures  Blood cultures pending  Continue supportive  care  A.m. labs  Long-term prognosis is guarded      Aleyda Pinto, APRN  12/04/21  17:59 EST    Note is dictated utilizing voice recognition software/Dragon

## 2021-12-04 NOTE — PLAN OF CARE
Goal Outcome Evaluation:           Stable vitals, pt complains of no pain.  NS running at 100ML/hr.  Electrolyte replacement protocall.  Will continue to monitor.

## 2021-12-05 LAB
ANION GAP SERPL CALCULATED.3IONS-SCNC: 10 MMOL/L (ref 5–15)
BACTERIA SPEC AEROBE CULT: ABNORMAL
BACTERIA SPEC AEROBE CULT: ABNORMAL
BASOPHILS # BLD AUTO: 0 10*3/MM3 (ref 0–0.2)
BASOPHILS NFR BLD AUTO: 0.6 % (ref 0–1.5)
BUN SERPL-MCNC: 5 MG/DL (ref 8–23)
BUN/CREAT SERPL: 10.9 (ref 7–25)
CALCIUM SPEC-SCNC: 7.8 MG/DL (ref 8.6–10.5)
CHLORIDE SERPL-SCNC: 98 MMOL/L (ref 98–107)
CO2 SERPL-SCNC: 32 MMOL/L (ref 22–29)
CREAT SERPL-MCNC: 0.46 MG/DL (ref 0.57–1)
DEPRECATED RDW RBC AUTO: 56.9 FL (ref 37–54)
EOSINOPHIL # BLD AUTO: 0.4 10*3/MM3 (ref 0–0.4)
EOSINOPHIL NFR BLD AUTO: 4.8 % (ref 0.3–6.2)
ERYTHROCYTE [DISTWIDTH] IN BLOOD BY AUTOMATED COUNT: 21.3 % (ref 12.3–15.4)
GFR SERPL CREATININE-BSD FRML MDRD: 136 ML/MIN/1.73
GLUCOSE BLDC GLUCOMTR-MCNC: 119 MG/DL (ref 70–105)
GLUCOSE BLDC GLUCOMTR-MCNC: 126 MG/DL (ref 70–105)
GLUCOSE BLDC GLUCOMTR-MCNC: 141 MG/DL (ref 70–105)
GLUCOSE BLDC GLUCOMTR-MCNC: 192 MG/DL (ref 70–105)
GLUCOSE SERPL-MCNC: 100 MG/DL (ref 65–99)
HCT VFR BLD AUTO: 26.7 % (ref 34–46.6)
HGB BLD-MCNC: 8.6 G/DL (ref 12–15.9)
LYMPHOCYTES # BLD AUTO: 1.3 10*3/MM3 (ref 0.7–3.1)
LYMPHOCYTES NFR BLD AUTO: 16.9 % (ref 19.6–45.3)
MAGNESIUM SERPL-MCNC: 2.2 MG/DL (ref 1.6–2.4)
MCH RBC QN AUTO: 24.8 PG (ref 26.6–33)
MCHC RBC AUTO-ENTMCNC: 32.3 G/DL (ref 31.5–35.7)
MCV RBC AUTO: 77 FL (ref 79–97)
MONOCYTES # BLD AUTO: 0.8 10*3/MM3 (ref 0.1–0.9)
MONOCYTES NFR BLD AUTO: 10.1 % (ref 5–12)
NEUTROPHILS NFR BLD AUTO: 5.2 10*3/MM3 (ref 1.7–7)
NEUTROPHILS NFR BLD AUTO: 67.6 % (ref 42.7–76)
NRBC BLD AUTO-RTO: 0.1 /100 WBC (ref 0–0.2)
PLATELET # BLD AUTO: 262 10*3/MM3 (ref 140–450)
PMV BLD AUTO: 7.1 FL (ref 6–12)
POTASSIUM SERPL-SCNC: 2.1 MMOL/L (ref 3.5–5.2)
POTASSIUM SERPL-SCNC: 3.1 MMOL/L (ref 3.5–5.2)
POTASSIUM SERPL-SCNC: 3.2 MMOL/L (ref 3.5–5.2)
RBC # BLD AUTO: 3.47 10*6/MM3 (ref 3.77–5.28)
SODIUM SERPL-SCNC: 140 MMOL/L (ref 136–145)
WBC NRBC COR # BLD: 7.6 10*3/MM3 (ref 3.4–10.8)

## 2021-12-05 PROCEDURE — 93010 ELECTROCARDIOGRAM REPORT: CPT | Performed by: INTERNAL MEDICINE

## 2021-12-05 PROCEDURE — 84132 ASSAY OF SERUM POTASSIUM: CPT | Performed by: INTERNAL MEDICINE

## 2021-12-05 PROCEDURE — 85025 COMPLETE CBC W/AUTO DIFF WBC: CPT | Performed by: NURSE PRACTITIONER

## 2021-12-05 PROCEDURE — 83735 ASSAY OF MAGNESIUM: CPT | Performed by: INTERNAL MEDICINE

## 2021-12-05 PROCEDURE — 25010000002 ONDANSETRON PER 1 MG: Performed by: NURSE PRACTITIONER

## 2021-12-05 PROCEDURE — 0 POTASSIUM CHLORIDE 10 MEQ/100ML SOLUTION: Performed by: NURSE PRACTITIONER

## 2021-12-05 PROCEDURE — 82962 GLUCOSE BLOOD TEST: CPT

## 2021-12-05 PROCEDURE — 80048 BASIC METABOLIC PNL TOTAL CA: CPT | Performed by: NURSE PRACTITIONER

## 2021-12-05 PROCEDURE — 63710000001 INSULIN LISPRO (HUMAN) PER 5 UNITS: Performed by: NURSE PRACTITIONER

## 2021-12-05 PROCEDURE — 93005 ELECTROCARDIOGRAM TRACING: CPT | Performed by: INTERNAL MEDICINE

## 2021-12-05 PROCEDURE — 97162 PT EVAL MOD COMPLEX 30 MIN: CPT

## 2021-12-05 PROCEDURE — 99232 SBSQ HOSP IP/OBS MODERATE 35: CPT | Performed by: SURGERY

## 2021-12-05 PROCEDURE — 25010000002 CEFTRIAXONE PER 250 MG: Performed by: INTERNAL MEDICINE

## 2021-12-05 PROCEDURE — 99233 SBSQ HOSP IP/OBS HIGH 50: CPT | Performed by: INTERNAL MEDICINE

## 2021-12-05 RX ORDER — HYDROCODONE BITARTRATE AND ACETAMINOPHEN 5; 325 MG/1; MG/1
1 TABLET ORAL EVERY 4 HOURS PRN
Status: DISCONTINUED | OUTPATIENT
Start: 2021-12-05 | End: 2021-12-08 | Stop reason: HOSPADM

## 2021-12-05 RX ORDER — SODIUM CHLORIDE AND POTASSIUM CHLORIDE 150; 900 MG/100ML; MG/100ML
50 INJECTION, SOLUTION INTRAVENOUS CONTINUOUS
Status: DISCONTINUED | OUTPATIENT
Start: 2021-12-06 | End: 2021-12-07

## 2021-12-05 RX ADMIN — POTASSIUM CHLORIDE 10 MEQ: 7.46 INJECTION, SOLUTION INTRAVENOUS at 17:04

## 2021-12-05 RX ADMIN — POTASSIUM CHLORIDE 10 MEQ: 7.46 INJECTION, SOLUTION INTRAVENOUS at 10:25

## 2021-12-05 RX ADMIN — POTASSIUM CHLORIDE 40 MEQ: 1.5 POWDER, FOR SOLUTION ORAL at 08:06

## 2021-12-05 RX ADMIN — SODIUM CHLORIDE 100 ML/HR: 9 INJECTION, SOLUTION INTRAVENOUS at 04:47

## 2021-12-05 RX ADMIN — ACETAMINOPHEN 650 MG: 325 TABLET, FILM COATED ORAL at 18:56

## 2021-12-05 RX ADMIN — INSULIN LISPRO 2 UNITS: 100 INJECTION, SOLUTION INTRAVENOUS; SUBCUTANEOUS at 01:35

## 2021-12-05 RX ADMIN — METRONIDAZOLE 500 MG: 500 TABLET ORAL at 22:01

## 2021-12-05 RX ADMIN — SODIUM CHLORIDE, PRESERVATIVE FREE 10 ML: 5 INJECTION INTRAVENOUS at 08:05

## 2021-12-05 RX ADMIN — METRONIDAZOLE 500 MG: 500 TABLET ORAL at 13:18

## 2021-12-05 RX ADMIN — SODIUM CHLORIDE 100 ML/HR: 9 INJECTION, SOLUTION INTRAVENOUS at 21:09

## 2021-12-05 RX ADMIN — ONDANSETRON 4 MG: 2 INJECTION INTRAMUSCULAR; INTRAVENOUS at 17:22

## 2021-12-05 RX ADMIN — ACETAMINOPHEN 650 MG: 325 TABLET, FILM COATED ORAL at 10:12

## 2021-12-05 RX ADMIN — SODIUM CHLORIDE, PRESERVATIVE FREE 10 ML: 5 INJECTION INTRAVENOUS at 21:10

## 2021-12-05 RX ADMIN — POTASSIUM CHLORIDE 10 MEQ: 7.46 INJECTION, SOLUTION INTRAVENOUS at 13:18

## 2021-12-05 RX ADMIN — POTASSIUM CHLORIDE 10 MEQ: 7.46 INJECTION, SOLUTION INTRAVENOUS at 14:16

## 2021-12-05 RX ADMIN — POTASSIUM CHLORIDE 10 MEQ: 7.46 INJECTION, SOLUTION INTRAVENOUS at 11:49

## 2021-12-05 RX ADMIN — METRONIDAZOLE 500 MG: 500 TABLET ORAL at 06:15

## 2021-12-05 RX ADMIN — CEFTRIAXONE SODIUM 2 G: 2 INJECTION, POWDER, FOR SOLUTION INTRAMUSCULAR; INTRAVENOUS at 21:09

## 2021-12-05 RX ADMIN — HYDROCODONE BITARTRATE AND ACETAMINOPHEN 1 TABLET: 5; 325 TABLET ORAL at 22:37

## 2021-12-05 RX ADMIN — POTASSIUM CHLORIDE 10 MEQ: 7.46 INJECTION, SOLUTION INTRAVENOUS at 16:00

## 2021-12-05 NOTE — PROGRESS NOTES
[facility] Medicine Services Daily Progress Note    Patient Name: Carmella Pelayo  : 1955  MRN: [id number]  Primary Care Physician:  Deana Wallace MD  Date of admission: 2021      Subjective      Chief Complaint: Abdominal discomfort      Hypokalemia has been noticed  She denies pain or vomiting or diarrhea    Repeat potassium better  Awaiting IR/CT-guided drainage  Orthopedic surgeon not planning on any intervention      ROS   All other systems reviewed and negative    Objective      Vitals:   Temp:  [98.1 °F (36.7 °C)-98.8 °F (37.1 °C)] 98.8 °F (37.1 °C)  Heart Rate:  [85-88] 87  Resp:  [14-16] 14  BP: (118-148)/(70-80) 148/80    Physical Exam *  GENERAL APPEARANCE: Well developed, well nourished, alert and cooperative, and appears to be in no acute distress.  HEAD: normocephalic.  EYES: PERRL, EOMI. vision intact grossly.  EARS: Intact hearing.  No gross abnormalities.  NOSE: No nasal discharge.  THROAT: Clear   NECK: Neck supple, non-tender without lymphadenopathy, masses or thyromegaly.  CARDIAC: Normal S1 and S2. No S3, S4 or murmurs. Rhythm is regular. There is no peripheral edema, cyanosis or pallor. Extremities are warm and well perfused. Capillary refill is less than 2 seconds. No carotid bruits.  LUNGS: Clear to auscultation and percussion without rales, rhonchi, wheezing or diminished breath sounds.  ABDOMEN: Drain position confirmed  No surrounding erythema or inflammation  Mild localized tenderness  Genital/pelvic examination deferred  MUSKULOSKELETAL mild tenderness over the lateral left trochanter.  NEUROLOGICAL: CN II-XII intact. Strength and sensation symmetric and intact throughout. Reflexes 2+ throughout. Cerebellar testing normal.  SKIN: Skin normal color, texture and turgor with no lesions or eruptions.  PSYCHIATRIC: Alert cooperative not suicidal            Result Review    Result Review:  I have personally reviewed the results from the time of  this admission to 12/5/2021 12:37 EST and agree with these findings:  [x]  Laboratory  [x]  Microbiology  [x]  Radiology  []  EKG/Telemetry   []  Cardiology/Vascular   []  Pathology  []  Old records  []  Other:  Most notable findings include: As outlined above  Wounds (last 24 hours)     LDA Wound     Row Name 12/05/21 0444 12/04/21 1901          Wound 12/04/21 1845 Bilateral coccyx    Wound - Properties Group Placement Date: 12/04/21  - Placement Time: 1845  -SS Present on Hospital Admission: Y  -SS Side: Bilateral  -SS Location: coccyx  -SS Stage, Pressure Injury : other (see comments)  -SS, MASD      Wound Image  View All Images View Images  -SS --     Dressing Appearance -- open to air  -SS     Closure -- None  -SS     Base -- pink; red; other (see comments)  excoriated  -SS     Edges -- irregular  -SS     Drainage Amount -- none  -SS     Care, Wound -- cleansed with; sterile water  -SS     Dressing Care -- skin barrier agent applied  -SS     Retired Wound - Properties Group Date first assessed: 12/04/21  - Time first assessed: 1845  -SS Present on Hospital Admission: Y  -SS Side: Bilateral  -SS Location: coccyx  -SS           User Key  (r) = Recorded By, (t) = Taken By, (c) = Cosigned By    Initials Name Provider Type    Lindsey Edwards LPN Licensed Nurse                  Assessment/Plan      Brief Patient Summary:  Carmella Pelayo is a 66 y.o. female   Carmella Pelayo is a 66 y.o. female with left lower quadrant abdominal abscess s/p percutaneous drain placement 11/18/2021. Cultures grew group B streptococcus. She was discharged to rehab on 11/21/2021 on 2 weeks of IV rocephin and 2 weeks of oral flagyl. She saw Dr. Carpenter yesterday and was told that her antibiotics were going to change as yesterday was supposed to be her last day of IV rocephin per her report. She did not have any antibiotics 12/3/2021. She was discharged from rehab to home yesterday 12/3/2021. She returns to the ED 12/4/2021  with complaints of lower abdominal pain and left hip pain. She stated her left lower abdominal pain returned a few days ago. She feels pressure like a seat belt is wrapped around her left lower abdomen. Her output from her percutaneous drain has not increased or changed in color. She has left hip, groin, and leg pain. Her left leg is swollen. The pain is a severe ache that worsens with standing. She can barely put pressure on her leg and is unable to walk. She denied any fever, nausea, vomiting, or diarrhea. She has had decreased oral intake because nothing tastes good since being on antibiotics. She did have some hematuria this morning. She called EMS to take her to the ED.   In the ED patient had CT abdomen that showed evolving left peritoneal/retroperitoneal abscess and soft tissue phlegmon. Surgical drain is present. Larger components of this abscess collection appear significantly decreased compared to prior. Small irregular lobulations in the left lower quadrant and along the left peritoneal wall persist and are not included communication with the drain. Redemonstrated soft tissue defect and skin thickening in the perineum compatible with known vulvar cancer. 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 New enhancing crescentic fluid collection posterior to the greater trochanter which could reflect bursitis versus abscess. Permeative changes in the left iliac wing and left ischium concerning for active bony destruction. Given adjacent soft tissue infection, findings are concerning for osteomyelitis in this location. WBC normal, afebrile, tachycardic low 100s, not hypotensive. CMP showed potassium 2.3. She was started on IV cefepime and vancomycin. She was admitted for further treatment of left hip abscess, possible osteomyelitis of left iliac wing and ischium. ID will be consulted.   She has  "recurrent vulvar cancer s/p vulvectomy, radiation, and chemotherapy no longer a hospice patient but not receiving any current treatment for her cancer. She has chronic perirectal wounds from previous vulvectomy surgery 2019.         cefTRIAXone, 2 g, Intravenous, Q24H  insulin lispro, 0-7 Units, Subcutaneous, Q6H  metroNIDAZOLE, 500 mg, Oral, Q8H  sodium chloride, 10 mL, Intravenous, Q12H       sodium chloride, 100 mL/hr, Last Rate: 100 mL/hr (12/05/21 0447)         Active Hospital Problems:  Active Hospital Problems    Diagnosis    • **Abscess of left hip    • Intra-abdominal abscess (HCC)    • Osteomyelitis of left side of pelvis (HCC)    • Left hip pain    • Hypokalemia    • Hypomagnesemia    • Malignant neoplasm of vulva (HCC)      Squamous cell carcinoma     • Left lower quadrant abdominal pain    • Anemia    • Type 2 diabetes mellitus (HCC)      Patient no longer taking metformin d/t insurance issues.  Patient no longer taking metformin d/t insurance issues.       Plan:     Left hip pain   Suspected abscess of the left hip/greater trochanter  Osteomyelitis left iliac wing and left ischium  -CT abd/pelvis: \"New enhancing crescentic fluid collection posterior to the greater trochanter which could reflect bursitis versus abscess.  Permeative changes in the left iliac wing and left ischium concerning for active bony destruction.  Given adjacent soft tissue infection, findings are concerning for osteomyelitis in this location.\"  -WBC normal, afebrile, mildly tachycardic, not hypotensive  -blood cultures ordered  -IV Rocephin and Flagyl per ID  -Dr. Adams recommended IV antibiotics for possible osteomyelitis no orthopedic surgery indicated       Left lower quadrant abdominal abscess status post percutaneous drain placement 11/18/2021  CT abd/pelvis: \"evolving left peritoneal/retroperitoneal abscess and soft tissue phlegmon.  Surgical drain is present.  Larger components of this abscess collection appear " "significantly decreased compared to prior.  Small irregular lobulations in the left lower quadrant and along the left peritoneal wall persist and are not included communication with the drain.  Redemonstrated soft tissue defect and skin thickening in the perineum compatible with known vulvar cancer.  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 Carpenter's office  -pt was discharged on 11/21/2021 to rehab to complete 2 weeks of IV rocephin and oral flagyl after her fluid culture grew group B streptococcus. She reported she did not have her last day of IV rocephin 12/3 but did complete the oral flagyl.   -consult general surgery/  -Dr. Mckeon recommendingPERC drainage of this hopefully will be able to get done tomorrow pending availability  -Consult heme-onc as well for possibility of underlying recurrent malignancy      Chronic perirectal wounds from previous vulvectomy surgery in 2019 along with moisture associated dermatitis.   Fairly significant denuded areas with some drainage.  -Wound cultures from 11/20/2021 also grew group B streptococcus    Hypokalemia  Hypomagnesemia   -K 2.3  -Mg 1.3  -Replacement protocol  -Consult nephrologist    Vulvar cancer s/p vulvectomy, radiation, and chemotherapy   -no longer a hospice patient but not receiving any current treatment for her cancer  -chronic perirectal wounds from previous vulvectomy surgery 2019.       DM Type II  -glucose 154  -pt's last hgb a1c was 10  -add SSI AC/HS       DVT prophylaxis:  Mechanical DVT prophylaxis orders are present.    CODE STATUS:    Level Of Support Discussed With: Patient  Code Status (Patient has no pulse and is not breathing): CPR (Attempt to Resuscitate)  Medical Interventions (Patient has pulse or is breathing): Full Support      Disposition:  I expect patient to " be discharged home health.    This patient has been examined wearing appropriate Personal Protective Equipment and discussed with hospital infection control department. 12/05/21      Electronically signed by Brandon Cruz MD, 12/05/21, 12:37 EST.  [facility]ist Team

## 2021-12-05 NOTE — PROGRESS NOTES
Infectious Diseases Progress Note      LOS: 1 day   Patient Care Team:  Deana Wallace MD as PCP - General (Family Medicine)    Chief Complaint: Weakness    Subjective     The patient had no fever during the last 24 hours. She remained hemodynamically stable.    Review of Systems:   Review of Systems   Constitutional: Negative.    HENT: Negative.    Eyes: Negative.    Respiratory: Negative.    Cardiovascular: Negative.    Gastrointestinal: Negative.    Genitourinary: Negative.    Musculoskeletal: Negative.    Skin: Negative.    Neurological: Negative.    Hematological: Negative.    Psychiatric/Behavioral: Negative.         Objective     Vital Signs  Temp:  [98 °F (36.7 °C)-98.8 °F (37.1 °C)] 98 °F (36.7 °C)  Heart Rate:  [86-88] 86  Resp:  [14-16] 16  BP: (118-148)/(72-84) 134/84    Physical Exam:  Physical Exam  Vitals and nursing note reviewed.   Constitutional:       Appearance: She is well-developed.   HENT:      Head: Normocephalic and atraumatic.   Eyes:      Pupils: Pupils are equal, round, and reactive to light.   Cardiovascular:      Rate and Rhythm: Normal rate and regular rhythm.      Heart sounds: Normal heart sounds.   Pulmonary:      Effort: Pulmonary effort is normal. No respiratory distress.      Breath sounds: Normal breath sounds. No wheezing or rales.   Abdominal:      General: Bowel sounds are normal. There is no distension.      Palpations: Abdomen is soft. There is no mass.      Tenderness: There is no abdominal tenderness. There is no guarding or rebound.   Musculoskeletal:         General: No deformity. Normal range of motion.      Cervical back: Normal range of motion and neck supple.   Skin:     General: Skin is warm.      Findings: No erythema or rash.   Neurological:      Mental Status: She is alert and oriented to person, place, and time.      Cranial Nerves: No cranial nerve deficit.          Results Review:    I have reviewed all clinical data, test, lab, and imaging results.      Radiology  No Radiology Exams Resulted Within Past 24 Hours    Cardiology    Laboratory    Results from last 7 days   Lab Units 12/05/21  0347 12/04/21  0422   WBC 10*3/mm3 7.60 8.60   HEMOGLOBIN g/dL 8.6* 9.7*   HEMATOCRIT % 26.7* 29.7*   PLATELETS 10*3/mm3 262 312     Results from last 7 days   Lab Units 12/05/21  1116 12/05/21  0347 12/04/21  1335 12/04/21  0422   SODIUM mmol/L  --  140 141 138   POTASSIUM mmol/L 3.1* 2.1* 2.5* 2.3*   CHLORIDE mmol/L  --  98 99 94*   CO2 mmol/L  --  32.0* 31.0* 31.0*   BUN mg/dL  --  5* 5* 5*   CREATININE mg/dL  --  0.46* 0.38* 0.43*   GLUCOSE mg/dL  --  100* 140* 154*   ALBUMIN g/dL  --   --   --  2.70*   BILIRUBIN mg/dL  --   --   --  0.6   ALK PHOS U/L  --   --   --  353*   AST (SGOT) U/L  --   --   --  7   ALT (SGPT) U/L  --   --   --  8   LIPASE U/L  --   --   --  13   CALCIUM mg/dL  --  7.8* 7.9* 8.6                 Microbiology   Microbiology Results (last 10 days)     Procedure Component Value - Date/Time    Blood Culture - Blood, Arm, Left [[id number]]  (Normal) Collected: 12/04/21 0931    Lab Status: Preliminary result Specimen: Blood from Arm, Left Updated: 12/05/21 1000     Blood Culture No growth at 24 hours    Blood Culture - Blood, Arm, Right [[id number]]  (Normal) Collected: 12/04/21 0928    Lab Status: Preliminary result Specimen: Blood from Arm, Right Updated: 12/05/21 1116     Blood Culture No growth at 24 hours    COVID PRE-OP / PRE-PROCEDURE SCREENING ORDER (NO ISOLATION) - Swab, Nasopharynx [[id number]]  (Normal) Collected: 12/04/21 0719    Lab Status: Final result Specimen: Swab from Nasopharynx Updated: 12/04/21 0814    Narrative:      The following orders were created for panel order COVID PRE-OP / PRE-PROCEDURE SCREENING ORDER (NO ISOLATION) - Swab, Nasopharynx.  Procedure                               Abnormality         Status                     ---------                               -----------         ------                      COVID-19,CEPHEID/ISELA/BD...[[id number]]  Normal              Final result                 Please view results for these tests on the individual orders.    COVID-19,CEPHEID/ISELA/BDMAX,COR/TUNDE/PAD/KIMO IN-HOUSE(OR EMERGENT/ADD-ON),NP SWAB IN TRANSPORT MEDIA 3-4 HR TAT, RT-PCR - Swab, Nasopharynx [[id number]]  (Normal) Collected: 12/04/21 0719    Lab Status: Final result Specimen: Swab from Nasopharynx Updated: 12/04/21 0814     COVID19 Not Detected    Narrative:      Fact sheet for providers: https://www.fda.gov/media/780785/download     Fact sheet for patients: https://www.fda.gov/media/990072/download  Fact sheet for providers: https://www.fda.gov/media/389522/download     Fact sheet for patients: https://www.fda.gov/media/107396/download    Urine Culture - Urine, Urine, Clean Catch [[id number]]  (Abnormal) Collected: 12/04/21 0548    Lab Status: Final result Specimen: Urine, Clean Catch Updated: 12/05/21 1323     Urine Culture Yeast isolated     Comment: No further workup.           <25,000 CFU/mL Mixed Juwan Isolated    Narrative:      Specimen contains mixed organisms of questionable pathogenicity which indicates contamination with commensal juwan.  Further identification is unlikely to provide clinically useful information.  Suggest recollection.          Medication Review:       Schedule Meds  cefTRIAXone, 2 g, Intravenous, Q24H  insulin lispro, 0-7 Units, Subcutaneous, Q6H  metroNIDAZOLE, 500 mg, Oral, Q8H  sodium chloride, 10 mL, Intravenous, Q12H        Infusion Meds  sodium chloride, 100 mL/hr, Last Rate: 100 mL/hr (12/05/21 9275)        PRN Meds  •  acetaminophen **OR** acetaminophen **OR** acetaminophen  •  aluminum-magnesium hydroxide-simethicone  •  Calcium Gluconate-NaCl **AND** calcium gluconate **AND** Calcium, Ionized  •  dextrose  •  dextrose  •  glucagon (human recombinant)  •  insulin lispro **AND** insulin lispro  •  magnesium sulfate **OR** magnesium sulfate **OR** magnesium sulfate  •   melatonin  •  ondansetron **OR** ondansetron  •  potassium & sodium phosphates **OR** potassium & sodium phosphates  •  potassium chloride **OR** potassium chloride **OR** potassium chloride  •  [COMPLETED] Insert peripheral IV **AND** sodium chloride  •  sodium chloride        Assessment/Plan       Antimicrobial Therapy   1. IV ceftriaxone        2. P.o. metronidazole        3.        4.        5.                Assessment     Recurrent abdominal abscesses.  The current CT the abdomen is shows the original normal abscess was significantly decreased but there are no new fluid collections along the  left lower quadrant and along the left peritoneal wall that do not communicate with the drain.  There is some concern for possible retrocutaneous fistula.  There is a new fluid collection posterior to the greater trochanter could be bursitis versus abscess.    -Patient has continued lower abdominal pain     Concern for possible new finding of osteomyelitis versus metastatic disease  CT also showed some changes in the left iliac vein and  left ischium concerning for possible osteomyelitis.    -Patient complains about recent pain to her left hip and left leg with some subsequent swelling     Recent history of large left lower quadrant abscess.    Abscess was drained on 11/18/2021 and 400 mL of purulent drainage was removed.  Cultures are showing heavy growth of group B streptococcus.  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  However patient does have some chronic perirectal wounds  -Patient completed 2 weeks of IV Rocephin and p.o. 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   Fairly significant denuded areas with some drainage.  -Wound cultures from 11/20/2021 also grew group B streptococcus     Type 2 diabetes-uncontrolled patient's A1c is 10     History of vulvar cancer with a vulvectomy and radiation and  chemotherapy treatments approximately 2 years ago.  Patient has had no recent treatments and-she reports that the cancer has returned and she cannot tolerate chemotherapy  -Patient was hospice but is currently not with that service and since she is getting treatment for infections  -Patient still has a port     COVID-19 screen was negative     Plan     Continue IV Rocephin 2 g every 24 for 4 weeks  Continue p.o. Flagyl 500 mg 3 times daily for 4 weeks  Intervention radiology is supposed to place drain tube tomorrow  Continue supportive care  A.m. labs  Long-term prognosis is guarded    Faisal Clark MD  12/05/21  16:15 EST    Note is dictated utilizing voice recognition software/Dragon

## 2021-12-05 NOTE — PROGRESS NOTES
General Surgery Progress Note    Name: Carmella Pelayo ADMIT: 2021   : 1955  PCP: Deana Wallace MD    MRN: [id number] LOS: 1 days   AGE/SEX: 66 y.o. female  ROOM: [facility]/[address] [place] [address]    Chief Complaint   Patient presents with   • Abdominal Pain     Subjective     66 y.o. female with a left lower quadrant abscess with status post percutaneous drainage unknown etiology for the development of this collection    Overnight no major issues no fever chills new shortness of breath or chest pain.  Mild improvement in her left hip pain.    Objective     Scheduled Medications:   cefTRIAXone, 2 g, Intravenous, Q24H  insulin lispro, 0-7 Units, Subcutaneous, Q6H  metroNIDAZOLE, 500 mg, Oral, Q8H  sodium chloride, 10 mL, Intravenous, Q12H        Active Infusions:  sodium chloride, 100 mL/hr, Last Rate: 100 mL/hr (21 0447)        As Needed Medications:  •  acetaminophen **OR** acetaminophen **OR** acetaminophen  •  aluminum-magnesium hydroxide-simethicone  •  Calcium Gluconate-NaCl **AND** calcium gluconate **AND** Calcium, Ionized  •  dextrose  •  dextrose  •  glucagon (human recombinant)  •  insulin lispro **AND** insulin lispro  •  magnesium sulfate **OR** magnesium sulfate **OR** magnesium sulfate  •  melatonin  •  ondansetron **OR** ondansetron  •  potassium & sodium phosphates **OR** potassium & sodium phosphates  •  potassium chloride **OR** potassium chloride **OR** potassium chloride  •  [COMPLETED] Insert peripheral IV **AND** sodium chloride  •  sodium chloride    Vital Signs  Vital Signs (range)  Temp:  [98.1 °F (36.7 °C)-98.8 °F (37.1 °C)] 98.8 °F (37.1 °C)  Heart Rate:  [85-88] 87  Resp:  [14-16] 14  BP: (118-148)/(70-80) 148/80    Physical Exam:  Physical Exam  Constitutional:       Appearance: Normal appearance.   HENT:      Head: Normocephalic and atraumatic.   Pulmonary:      Effort: Pulmonary effort is normal. No respiratory distress.   Abdominal:      General: Bowel sounds are normal.  There is no distension.      Palpations: Abdomen is soft.   Skin:     General: Skin is warm and dry.   Neurological:      General: No focal deficit present.      Mental Status: She is alert. Mental status is at baseline.   Psychiatric:         Mood and Affect: Mood normal.         Behavior: Behavior normal.         Results Review:     CBC    Results from last 7 days   Lab Units 12/05/21  0347 12/04/21  0422   WBC 10*3/mm3 7.60 8.60   HEMOGLOBIN g/dL 8.6* 9.7*   PLATELETS 10*3/mm3 262 312     BMP   Results from last 7 days   Lab Units 12/05/21  1116 12/05/21  0347 12/04/21  1335 12/04/21  0422   SODIUM mmol/L  --  140 141 138   POTASSIUM mmol/L 3.1* 2.1* 2.5* 2.3*   CHLORIDE mmol/L  --  98 99 94*   CO2 mmol/L  --  32.0* 31.0* 31.0*   BUN mg/dL  --  5* 5* 5*   CREATININE mg/dL  --  0.46* 0.38* 0.43*   GLUCOSE mg/dL  --  100* 140* 154*   MAGNESIUM mg/dL  --  2.2  --  1.3*     Radiology(recent) CT Abdomen Pelvis With Contrast    Result Date: 12/4/2021  Impression: 1. Evolving left peritoneal/retroperitoneal abscess and soft tissue phlegmon. A surgical drain is present. The larger components of this abscess collection appear significantly decreased compared to prior. Some small irregular lobulations in the left lower quadrant and along the left peritoneal wall persist and are not included communication with the drain. 2. Redemonstrated soft tissue defect and skin thickening in the perineum compatible with known vulvar cancer. 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 3. New enhancing crescentic fluid collection posterior to the greater trochanter which could reflect bursitis versus abscess. 4. Permeative changes in the left iliac wing and left ischium concerning for active bony destruction. Given adjacent soft tissue infection, findings are concerning for osteomyelitis in this location.  Electronically signed by:  Aman Martínez M.D.  12/4/2021 4:48 AM      I reviewed the patient's new clinical results.    Assessment/Plan       Abscess of left hip    Malignant neoplasm of vulva (HCC)    Type 2 diabetes mellitus (HCC)    Left lower quadrant abdominal pain    Anemia    Intra-abdominal abscess (HCC)    Osteomyelitis of left side of pelvis (HCC)    Left hip pain    Hypokalemia    Hypomagnesemia      66 y.o. female with left lower quadrant abscess        Continue to treat this with antibiotics it may make sense to place another drain in the superior fluid collection that does not seem to be communicating with the PERC drain.    consult IR has been placed for PERC drainage of this hopefully will be able to get done tomorrow pending availability    N.p.o. after midnight    Diet change, labs ordered for tomorrow, reviewed orthopedic note    This note was created using Dragon Voice Recognition software.    Brian Mckeon MD  12/05/21  12:28 EST

## 2021-12-05 NOTE — PLAN OF CARE
Goal Outcome Evaluation:               IR consulted for PERC drain placement possibly tomorrow, 12/6, NPO after midnight, surgeon following. Hypokalemia, IV replacement given, nephrology consulted.

## 2021-12-05 NOTE — PLAN OF CARE
Goal Outcome Evaluation:  Plan of Care Reviewed With: patient           Outcome Summary: patient currenlty resting with no new complaints

## 2021-12-05 NOTE — THERAPY EVALUATION
Patient Name: Carmella Pelayo  : 1955    MRN: [id number]                              Today's Date: 2021       Admit Date: 2021    Visit Dx:     ICD-10-CM ICD-9-CM   1. Intra-abdominal abscess (HCC)  K65.1 567.22   2. Osteomyelitis of pelvis (HCC)  M86.9 730.25   3. Hypokalemia  E87.6 276.8     Patient Active Problem List   Diagnosis   • Recurrent malignant neoplasm (HCC)   • Malignant neoplasm of vulva (HCC)   • Type 2 diabetes mellitus (HCC)   • Thrombocytosis   • Left lower quadrant abdominal pain   • Intra-abdominal abscess (HCC)   • Acute UTI (urinary tract infection)   • Anemia   • Dermatitis associated with moisture   • Fever   • Acute cystitis with hematuria   • History of cancer of vulva   • Chronic pain syndrome   • HEBER (generalized anxiety disorder)   • Intra-abdominal abscess (HCC)   • Abscess of left hip   • Osteomyelitis of left side of pelvis (HCC)   • Left hip pain   • Hypokalemia   • Hypomagnesemia     Past Medical History:   Diagnosis Date   • Diabetes mellitus (HCC)    • Vulva cancer (HCC)      Past Surgical History:   Procedure Laterality Date   • VULVECTOMY        General Information     Row Name 21 1003          Physical Therapy Time and Intention    Document Type evaluation  -     Mode of Treatment physical therapy  -     Row Name 21 1003          General Information    Patient Profile Reviewed yes  -     Prior Level of Function independent:; all household mobility; gait; transfer  using RW, just d/c from 2 week rehab stay, home 1 day, plans to start   -     Existing Precautions/Restrictions fall  -     Barriers to Rehab medically complex  -     Row Name 21 1003          Living Environment    Lives With alone  reports neighbor and family helping as needed  -     Row Name 21 1003          Home Main Entrance    Number of Stairs, Main Entrance one  -     Row Name 21 1003          Stairs Within Home, Primary    Number of Stairs,  Within Home, Primary none  -     Row Name 12/05/21 1003          Cognition    Orientation Status (Cognition) oriented x 4  -     Row Name 12/05/21 1003          Safety Issues, Functional Mobility    Impairments Affecting Function (Mobility) endurance/activity tolerance; pain; strength; balance  -           User Key  (r) = Recorded By, (t) = Taken By, (c) = Cosigned By    Initials Name Provider Type     Raina Lopez, CRISPIN Physical Therapist               Mobility     Row Name 12/05/21 1005          Bed Mobility    Bed Mobility supine-sit  -     Supine-Sit [place] (Bed Mobility) modified [facility]     Assistive Device (Bed Mobility) bed rails; head of bed elevated  -     Comment (Bed Mobility) prefers OOB on R side of bed to decrease pain with rolling to L hip  -     Row Name 12/05/21 1005          Bed-Chair Transfer    Bed-Chair [place] (Transfers) modified [place]  -     Row Name 12/05/21 1005          Sit-Stand Transfer    Sit-Stand [place] (Transfers) [facility]     Row Name 12/05/21 1005          Gait/Stairs (Locomotion)    [place] Level (Gait) modified [facility]     Assistive Device (Gait) walker, front-wheeled  -     Distance in Feet (Gait) 30' in room  -     Comment (Gait/Stairs) does well using RW, pain L hip with wt shifting and stance  -           User Key  (r) = Recorded By, (t) = Taken By, (c) = Cosigned By    Initials Name Provider Type     Raina Lopez, PT Physical Therapist               Obj/Interventions     Row Name 12/05/21 1006          Range of Motion Comprehensive    General Range of Motion bilateral upper extremity ROM WFL; bilateral lower extremity ROM WFL  -     Comment, General Range of Motion pain with L hip AROM  -     Row Name 12/05/21 1006          Strength Comprehensive (MMT)    Comment, General Manual Muscle Testing (MMT) Assessment RLE 5/5, LLE hip 3/5, other 4/5  -     Row Name 12/05/21 1006           Balance    Balance Assessment sitting static balance; sitting dynamic balance; standing static balance; standing dynamic balance  -     Static Sitting Balance WFL  -JH     Dynamic Sitting Balance WFL  -JH     Static Standing Balance WFL  -JH     Dynamic Standing Balance WFL; supported  -           User Key  (r) = Recorded By, (t) = Taken By, (c) = Cosigned By    Initials Name Provider Type    Raina Benitez, PT Physical Therapist               Goals/Plan     Row Name 12/05/21 1009          Bed Mobility Goal 1 (PT)    Activity/Assistive Device (Bed Mobility Goal 1, PT) bed mobility activities, all  -JH     [place] Level/Cues Needed (Bed Mobility Goal 1, PT) independent  -     Time Frame (Bed Mobility Goal 1, PT) short term goal (STG); 1 week  -[facility] Name 12/05/21 1009          Transfer Goal 1 (PT)    Activity/Assistive Device (Transfer Goal 1, PT) sit-to-stand/stand-to-sit; bed-to-chair/chair-to-bed  -JH     [place] Level/Cues Needed (Transfer Goal 1, PT) independent  -JH     Time Frame (Transfer Goal 1, PT) short term goal (STG); 1 week  -[facility] Name 12/05/21 1009          Gait Training Goal 1 (PT)    Activity/Assistive Device (Gait Training Goal 1, PT) gait (walking locomotion); assistive device use; walker, rolling  -JH     [place] Level (Gait Training Goal 1, PT) modified independence  -JH     Distance (Gait Training Goal 1, PT) 100'  -JH     Time Frame (Gait Training Goal 1, PT) short term goal (STG); 1 week  -           User Key  (r) = Recorded By, (t) = Taken By, (c) = Cosigned By    Initials Name Provider Type    Raina Benitez, PT Physical Therapist               Clinical Impression     Row Name 12/05/21 1006          Pain    Additional Documentation Pain Scale: FACES Pre/Post-Treatment (Group)  -[facility] Name 12/05/21 1006          Pain Scale: FACES Pre/Post-Treatment    Pain: FACES Scale, Pretreatment 2-->hurts little bit  -     Posttreatment Pain Rating 6-->hurts  even more  -     Pain Location - Side Left  -     Pain Location hip  -     Row Name 12/05/21 1006          Plan of Care Review    Plan of Care Reviewed With patient  -     Outcome Summary 67 yo female with h/o vulvar CA, abdominal abscess.  Admitted with L hip pain and possible abscess/osteomyelitis.  Pt lives alone, just d/c home after 2 week rehab stay and was mobilizing household distances with a walker, has help from family and neighbor as needed.  Pt does well with in room mobility and transfers using RW, activity limited by L hip pain.  Will follow 3x/week with plan for return home [facility] services.  Pt has a RW and states w/c is scheduled to be delivered.  -     Row Name 12/05/21 1006          Therapy Assessment/Plan (PT)    Rehab Potential (PT) good, to achieve stated therapy goals  -     Criteria for Skilled Interventions Met (PT) yes; skilled treatment is necessary  -     Row Name 12/05/21 1006          Vital Signs    O2 Delivery Pre Treatment room air  -     O2 Delivery Intra Treatment room air  -     O2 Delivery Post Treatment room air  -     Row Name 12/05/21 1006          Positioning and Restraints    Pre-Treatment Position in bed  -     Post Treatment Position chair  -     In Chair notified nsg; sitting; call light within reach; exit alarm on; encouraged to call for assist  -           User Key  (r) = Recorded By, (t) = Taken By, (c) = Cosigned By    Initials Name Provider Type     Raina Lopez, CRISPIN Physical Therapist               Outcome Measures    No documentation.                              Physical Therapy Education                 Title: PT OT SLP Therapies (Done)     Topic: Physical Therapy (Done)     Point: Mobility training (Done)     Learning Progress Summary           Patient Acceptance, E,TB, VU by  at 12/5/2021 1009                   Point: Precautions (Done)     Learning Progress Summary           Patient Acceptance, E,TB, VU by  at 12/5/2021 1009                                User Key     Initials Effective Dates Name Provider Type Discipline     06/16/21 -  Raina Lopez PT Physical Therapist PT              PT Recommendation and Plan  Planned Therapy Interventions (PT): balance training, bed mobility training, gait training, transfer training, strengthening, patient/family education  Plan of Care Reviewed With: patient  Outcome Summary: 67 yo female with h/o vulvar CA, abdominal abscess.  Admitted with L hip pain and possible abscess/osteomyelitis.  Pt lives alone, just d/c home after 2 week rehab stay and was mobilizing household distances with a walker, has help from family and neighbor as needed.  Pt does well with in room mobility and transfers using RW, activity limited by L hip pain.  Will follow 3x/week with plan for return home [facility] services.  Pt has a RW and states w/c is scheduled to be delivered.     Time Calculation:    PT Charges     Row Name 12/05/21 1010             Time Calculation    Start Time 0825  -      Stop Time 0845  -      Time Calculation (min) 20 min  -      PT Received On 12/05/21  -      PT - Next Appointment 12/07/21  -      PT Goal Re-Cert Due Date 12/19/21  -              Time Calculation- PT    Total Timed Code Minutes- PT 0 minute(s)  -            User Key  (r) = Recorded By, (t) = Taken By, (c) = Cosigned By    Initials Name Provider Type     Raina Lopez PT Physical Therapist              Therapy Charges for Today     Code Description Service Date Service Provider Modifiers Qty    40986882939 HC PT EVAL MOD COMPLEXITY 3 12/5/2021 Raina Lopez, CRISPIN GP 1               Raina Lopez PT  12/5/2021

## 2021-12-05 NOTE — CONSULTS
"    [facility] Consult Note      Patient Name: Carmella Pelayo  : 1955  MRN: [id number]  Primary Care Physician:  Deana Wallace MD  Referring Physician: No Known Provider  Date of admission: 2021    Subjective     Reason for Consult: Abnormal electrolytes    HPI:   Caremlla Pelayo is a 66 y.o. female with a past medical history of diabetes mellitus type 2, chronic normocytic anemia associated with history of recurrent vulbar cancer status post vulvectomy radiation and chemotherapy in 2019.  Associated with recent history of intra-abdominal abscess status post percutaneous drainage 2021 with evidence of group B streptococcus patient completed 2 weeks of IV antibiotics.  Patient presents to the emergency department due to onset of left hip discomfort with decreased range of motion accompanied with ipsilateral lower extremity edema    Initial work-up showing persistent left peritoneal abscess with soft tissue phlegmon.  Patient was admitted for further management with evaluation of general surgery, orthopedics and infectious disease    Nephrology consultation of been requested for hypokalemia    Review of Systems:   14 point review of systems is otherwise negative except for mentioned above on HPI    Personal History     Past Medical History:   Diagnosis Date   • Diabetes mellitus (HCC)    • Vulva cancer (HCC)        Past Surgical History:   Procedure Laterality Date   • VULVECTOMY         Family History: family history is not on file.    Social History:  reports that she has never smoked. She has never used smokeless tobacco. She reports previous alcohol use. She reports previous drug use.    Home Medications:  Prior to Admission medications    Not on File       Allergies:  Allergies   Allergen Reactions   • Meperidine Palpitations, Anxiety and Nausea And Vomiting     Other reaction(s): \"feel like I am going to pass out\", Feel like I am going to have panic " attack, Irregular heart beat, Nausea     • Morphine Nausea And Vomiting, Anxiety and Palpitations       Objective     Vitals:   Temp:  [98 °F (36.7 °C)-98.8 °F (37.1 °C)] 98 °F (36.7 °C)  Heart Rate:  [86-88] 86  Resp:  [14-16] 16  BP: (118-148)/(72-84) 134/84    Intake/Output Summary (Last 24 hours) at 12/5/2021 1410  Last data filed at 12/5/2021 0400  Gross per 24 hour   Intake --   Output 380 ml   Net -380 ml       Physical Exam:   Constitutional: Awake, alert, no acute distress.  HEENT: Sclera anicteric, no conjunctival injection  Neck: Supple, no thyromegaly, no lymphadenopathy, trachea at midline, no JVD  Chest right upper chest Mediport in place  Respiratory: Clear to auscultation bilaterally, nonlabored respiration  Cardiovascular: RRR, no murmurs, no rubs or gallops, no carotid bruit  Gastrointestinal: Positive bowel sounds, abdomen is soft, .right lower pelvic area with local drain in place  : No palpable bladder  Musculoskeletal: +  Edema LE , no clubbing or cyanosis.  Decreased range of motion on left hip  Psychiatric: Appropriate affect, cooperative  Neurologic: Oriented x3, moving all extremities, normal speech and mental status  Skin: Warm and dry       Scheduled Meds:     cefTRIAXone, 2 g, Intravenous, Q24H  insulin lispro, 0-7 Units, Subcutaneous, Q6H  metroNIDAZOLE, 500 mg, Oral, Q8H  sodium chloride, 10 mL, Intravenous, Q12H      IV Meds:   sodium chloride, 100 mL/hr, Last Rate: 100 mL/hr (12/05/21 0447)        Results Reviewed:   I have personally reviewed the results from the time of this admission to 12/5/2021 14:10 EST     Lab Results   Component Value Date    GLUCOSE 100 (H) 12/05/2021    CALCIUM 7.8 (L) 12/05/2021     12/05/2021    K 3.1 (L) 12/05/2021    CO2 32.0 (H) 12/05/2021    CL 98 12/05/2021    BUN 5 (L) 12/05/2021    CREATININE 0.46 (L) 12/05/2021    EGFRIFAFRI 89 03/02/2017    EGFRIFNONA 136 12/05/2021    BCR 10.9 12/05/2021    ANIONGAP 10.0 12/05/2021      Lab Results    Component Value Date    MG 2.2 12/05/2021    ALBUMIN 2.70 (L) 12/04/2021           Assessment / Plan     ASSESSMENT:     Ref. Range 11/19/2021 04:01 11/19/2021 19:30 11/20/2021 04:45 11/21/2021 05:34 12/4/2021 04:22 12/4/2021 13:35 12/5/2021 03:47 12/5/2021 11:16   Potassium  3.5 - 5.2  3.0 (L) 4.2 3.8 3.8 2.3 (C) 2.5 (C) 2.1 (C) 3.1 (L)       Hypokalemia    -W/ normal magnesium . W/o history of diuretics use   - Likely from decreased oral intake , patient mention unquantified weight loss.  - Agreed with KCl replacement will follow trend    Intra-abdominal abscess with fluid collection  -Cleared by general surgery, orthopedics and infectious disease  -On IV fluids resuscitation  and ceftriaxone plus metronidazole    Diabetes Mellitus type 2   - .Continue insulin regimen / hypoglycemic regimen .  -Hypoglycemic protocol . POC glucose 4 x day .Diabetic diet    PLAN:  -Follow renal function closely  -Avoid nephrotoxins  -Adjust medications to GFR      Thank you for involving us in the care of Carmella Pelayo.  Please feel free to call with any questions.    Alber Billings MD  12/05/21  14:10 [facility]    [facility]  [phone]      Much of this encounter note is an electronic transcription/translation of spoken language to printed text. The electronic translation of spoken language may permit erroneous, or at times, nonsensical words or phrases to be inadvertently transcribed; Although I have reviewed the note for such errors, some may still exist.

## 2021-12-05 NOTE — PLAN OF CARE
Goal Outcome Evaluation:  Plan of Care Reviewed With: patient           Outcome Summary: 67 yo female with h/o vulvar CA, abdominal abscess.  Admitted with L hip pain and possible abscess/osteomyelitis.  Pt lives alone, just d/c home after 2 week rehab stay and was mobilizing household distances with a walker, has help from family and neighbor as needed.  Pt does well with in room mobility and transfers using RW, activity limited by L hip pain.  Will follow 3x/week with plan for return home [facility] services.  Pt has a RW and states w/c is scheduled to be delivered.

## 2021-12-06 ENCOUNTER — APPOINTMENT (OUTPATIENT)
Dept: CT IMAGING | Facility: HOSPITAL | Age: 66
End: 2021-12-06

## 2021-12-06 LAB
ALBUMIN SERPL-MCNC: 2.3 G/DL (ref 3.5–5.2)
ALBUMIN/GLOB SERPL: 0.5 G/DL
ALP SERPL-CCNC: 252 U/L (ref 39–117)
ALT SERPL W P-5'-P-CCNC: <5 U/L (ref 1–33)
ANION GAP SERPL CALCULATED.3IONS-SCNC: 9 MMOL/L (ref 5–15)
APTT PPP: 28.1 SECONDS (ref 24–31)
AST SERPL-CCNC: 10 U/L (ref 1–32)
BASOPHILS # BLD AUTO: 0 10*3/MM3 (ref 0–0.2)
BASOPHILS NFR BLD AUTO: 0.5 % (ref 0–1.5)
BILIRUB SERPL-MCNC: 0.2 MG/DL (ref 0–1.2)
BUN SERPL-MCNC: 3 MG/DL (ref 8–23)
BUN/CREAT SERPL: 8.6 (ref 7–25)
CALCIUM SPEC-SCNC: 7.9 MG/DL (ref 8.6–10.5)
CHLORIDE SERPL-SCNC: 102 MMOL/L (ref 98–107)
CO2 SERPL-SCNC: 30 MMOL/L (ref 22–29)
CREAT SERPL-MCNC: 0.35 MG/DL (ref 0.57–1)
DEPRECATED RDW RBC AUTO: 58.6 FL (ref 37–54)
EOSINOPHIL # BLD AUTO: 0.5 10*3/MM3 (ref 0–0.4)
EOSINOPHIL NFR BLD AUTO: 6.3 % (ref 0.3–6.2)
ERYTHROCYTE [DISTWIDTH] IN BLOOD BY AUTOMATED COUNT: 21.6 % (ref 12.3–15.4)
GFR SERPL CREATININE-BSD FRML MDRD: >150 ML/MIN/1.73
GLOBULIN UR ELPH-MCNC: 4.4 GM/DL
GLUCOSE BLDC GLUCOMTR-MCNC: 111 MG/DL (ref 70–105)
GLUCOSE BLDC GLUCOMTR-MCNC: 117 MG/DL (ref 70–105)
GLUCOSE BLDC GLUCOMTR-MCNC: 120 MG/DL (ref 70–105)
GLUCOSE BLDC GLUCOMTR-MCNC: 143 MG/DL (ref 70–105)
GLUCOSE BLDC GLUCOMTR-MCNC: 153 MG/DL (ref 70–105)
GLUCOSE BLDC GLUCOMTR-MCNC: 157 MG/DL (ref 70–105)
GLUCOSE SERPL-MCNC: 95 MG/DL (ref 65–99)
HCT VFR BLD AUTO: 28.4 % (ref 34–46.6)
HGB BLD-MCNC: 9 G/DL (ref 12–15.9)
INR PPP: 1.08 (ref 0.93–1.1)
LYMPHOCYTES # BLD AUTO: 1 10*3/MM3 (ref 0.7–3.1)
LYMPHOCYTES NFR BLD AUTO: 13.5 % (ref 19.6–45.3)
MCH RBC QN AUTO: 24.9 PG (ref 26.6–33)
MCHC RBC AUTO-ENTMCNC: 31.8 G/DL (ref 31.5–35.7)
MCV RBC AUTO: 78.3 FL (ref 79–97)
MONOCYTES # BLD AUTO: 0.7 10*3/MM3 (ref 0.1–0.9)
MONOCYTES NFR BLD AUTO: 8.8 % (ref 5–12)
NEUTROPHILS NFR BLD AUTO: 5.3 10*3/MM3 (ref 1.7–7)
NEUTROPHILS NFR BLD AUTO: 70.9 % (ref 42.7–76)
NRBC BLD AUTO-RTO: 0 /100 WBC (ref 0–0.2)
OSMOLALITY SERPL: 291 MOSM/KG (ref 280–301)
OSMOLALITY UR: 321 MOSM/KG (ref 300–800)
PLATELET # BLD AUTO: 258 10*3/MM3 (ref 140–450)
PMV BLD AUTO: 7.8 FL (ref 6–12)
POTASSIUM SERPL-SCNC: 2.9 MMOL/L (ref 3.5–5.2)
POTASSIUM SERPL-SCNC: 3 MMOL/L (ref 3.5–5.2)
POTASSIUM UR-SCNC: 21.8 MMOL/L
PROT SERPL-MCNC: 6.7 G/DL (ref 6–8.5)
PROTHROMBIN TIME: 11.9 SECONDS (ref 9.6–11.7)
RBC # BLD AUTO: 3.62 10*6/MM3 (ref 3.77–5.28)
SODIUM SERPL-SCNC: 141 MMOL/L (ref 136–145)
WBC NRBC COR # BLD: 7.5 10*3/MM3 (ref 3.4–10.8)

## 2021-12-06 PROCEDURE — 0 LIDOCAINE 1 % SOLUTION: Performed by: RADIOLOGY

## 2021-12-06 PROCEDURE — 49406 IMAGE CATH FLUID PERI/RETRO: CPT

## 2021-12-06 PROCEDURE — 25010000002 SODIUM CHLORIDE 0.9 % WITH KCL 20 MEQ 20-0.9 MEQ/L-% SOLUTION: Performed by: INTERNAL MEDICINE

## 2021-12-06 PROCEDURE — 85025 COMPLETE CBC W/AUTO DIFF WBC: CPT | Performed by: INTERNAL MEDICINE

## 2021-12-06 PROCEDURE — 84132 ASSAY OF SERUM POTASSIUM: CPT | Performed by: INTERNAL MEDICINE

## 2021-12-06 PROCEDURE — 99233 SBSQ HOSP IP/OBS HIGH 50: CPT | Performed by: SURGERY

## 2021-12-06 PROCEDURE — 63710000001 INSULIN LISPRO (HUMAN) PER 5 UNITS: Performed by: NURSE PRACTITIONER

## 2021-12-06 PROCEDURE — 80053 COMPREHEN METABOLIC PANEL: CPT | Performed by: INTERNAL MEDICINE

## 2021-12-06 PROCEDURE — 99152 MOD SED SAME PHYS/QHP 5/>YRS: CPT

## 2021-12-06 PROCEDURE — 82962 GLUCOSE BLOOD TEST: CPT

## 2021-12-06 PROCEDURE — 85730 THROMBOPLASTIN TIME PARTIAL: CPT | Performed by: SURGERY

## 2021-12-06 PROCEDURE — 99222 1ST HOSP IP/OBS MODERATE 55: CPT | Performed by: INTERNAL MEDICINE

## 2021-12-06 PROCEDURE — C1729 CATH, DRAINAGE: HCPCS

## 2021-12-06 PROCEDURE — C1819 TISSUE LOCALIZATION-EXCISION: HCPCS

## 2021-12-06 PROCEDURE — 75989 ABSCESS DRAINAGE UNDER X-RAY: CPT

## 2021-12-06 PROCEDURE — 25010000002 FENTANYL CITRATE (PF) 50 MCG/ML SOLUTION: Performed by: RADIOLOGY

## 2021-12-06 PROCEDURE — 25010000002 CEFTRIAXONE PER 250 MG: Performed by: INTERNAL MEDICINE

## 2021-12-06 PROCEDURE — 25010000002 ONDANSETRON PER 1 MG: Performed by: RADIOLOGY

## 2021-12-06 PROCEDURE — 25010000002 MIDAZOLAM PER 1 MG: Performed by: RADIOLOGY

## 2021-12-06 PROCEDURE — 84133 ASSAY OF URINE POTASSIUM: CPT | Performed by: INTERNAL MEDICINE

## 2021-12-06 PROCEDURE — C1769 GUIDE WIRE: HCPCS

## 2021-12-06 PROCEDURE — 99233 SBSQ HOSP IP/OBS HIGH 50: CPT | Performed by: INTERNAL MEDICINE

## 2021-12-06 PROCEDURE — 83935 ASSAY OF URINE OSMOLALITY: CPT | Performed by: INTERNAL MEDICINE

## 2021-12-06 PROCEDURE — 83930 ASSAY OF BLOOD OSMOLALITY: CPT | Performed by: INTERNAL MEDICINE

## 2021-12-06 PROCEDURE — 85610 PROTHROMBIN TIME: CPT | Performed by: SURGERY

## 2021-12-06 PROCEDURE — 0W9G30Z DRAINAGE OF PERITONEAL CAVITY WITH DRAINAGE DEVICE, PERCUTANEOUS APPROACH: ICD-10-PCS | Performed by: SURGERY

## 2021-12-06 RX ORDER — FOLIC ACID 1 MG/1
1 TABLET ORAL DAILY
Status: DISCONTINUED | OUTPATIENT
Start: 2021-12-06 | End: 2021-12-08 | Stop reason: HOSPADM

## 2021-12-06 RX ORDER — ONDANSETRON 2 MG/ML
INJECTION INTRAMUSCULAR; INTRAVENOUS
Status: COMPLETED | OUTPATIENT
Start: 2021-12-06 | End: 2021-12-06

## 2021-12-06 RX ORDER — MIDAZOLAM HYDROCHLORIDE 1 MG/ML
INJECTION INTRAMUSCULAR; INTRAVENOUS
Status: COMPLETED | OUTPATIENT
Start: 2021-12-06 | End: 2021-12-06

## 2021-12-06 RX ORDER — FENTANYL CITRATE 50 UG/ML
INJECTION, SOLUTION INTRAMUSCULAR; INTRAVENOUS
Status: COMPLETED | OUTPATIENT
Start: 2021-12-06 | End: 2021-12-06

## 2021-12-06 RX ORDER — LIDOCAINE HYDROCHLORIDE 10 MG/ML
INJECTION, SOLUTION INFILTRATION; PERINEURAL
Status: COMPLETED | OUTPATIENT
Start: 2021-12-06 | End: 2021-12-06

## 2021-12-06 RX ORDER — HYDROCODONE BITARTRATE AND ACETAMINOPHEN 10; 325 MG/1; MG/1
1 TABLET ORAL EVERY 4 HOURS PRN
Status: DISCONTINUED | OUTPATIENT
Start: 2021-12-06 | End: 2021-12-08 | Stop reason: HOSPADM

## 2021-12-06 RX ADMIN — ONDANSETRON 4 MG: 2 INJECTION INTRAMUSCULAR; INTRAVENOUS at 09:32

## 2021-12-06 RX ADMIN — METRONIDAZOLE 500 MG: 500 TABLET ORAL at 14:06

## 2021-12-06 RX ADMIN — METRONIDAZOLE 500 MG: 500 TABLET ORAL at 21:30

## 2021-12-06 RX ADMIN — FENTANYL CITRATE 100 MCG: 50 INJECTION, SOLUTION INTRAMUSCULAR; INTRAVENOUS at 09:38

## 2021-12-06 RX ADMIN — HYDROCODONE BITARTRATE AND ACETAMINOPHEN 1 TABLET: 5; 325 TABLET ORAL at 14:05

## 2021-12-06 RX ADMIN — CALCIUM ACETATE MONOHYDRATE AND ALUMINUM SULFATE TETRADECAHYDRATE 1000 ML: 952; 1347 POWDER, FOR SOLUTION TOPICAL at 21:31

## 2021-12-06 RX ADMIN — MIDAZOLAM 0.5 MG: 1 INJECTION INTRAMUSCULAR; INTRAVENOUS at 09:43

## 2021-12-06 RX ADMIN — METRONIDAZOLE 500 MG: 500 TABLET ORAL at 05:02

## 2021-12-06 RX ADMIN — SODIUM CHLORIDE AND POTASSIUM CHLORIDE 50 ML/HR: .9; .15 SOLUTION INTRAVENOUS at 01:20

## 2021-12-06 RX ADMIN — POTASSIUM CHLORIDE 40 MEQ: 20 TABLET, EXTENDED RELEASE ORAL at 19:38

## 2021-12-06 RX ADMIN — POTASSIUM CHLORIDE 40 MEQ: 1.5 POWDER, FOR SOLUTION ORAL at 11:32

## 2021-12-06 RX ADMIN — CEFTRIAXONE SODIUM 2 G: 2 INJECTION, POWDER, FOR SOLUTION INTRAMUSCULAR; INTRAVENOUS at 21:10

## 2021-12-06 RX ADMIN — MIDAZOLAM 1 MG: 1 INJECTION INTRAMUSCULAR; INTRAVENOUS at 09:38

## 2021-12-06 RX ADMIN — FOLIC ACID 1 MG: 1 TABLET ORAL at 11:32

## 2021-12-06 RX ADMIN — SODIUM CHLORIDE, PRESERVATIVE FREE 10 ML: 5 INJECTION INTRAVENOUS at 21:10

## 2021-12-06 RX ADMIN — INSULIN LISPRO 2 UNITS: 100 INJECTION, SOLUTION INTRAVENOUS; SUBCUTANEOUS at 01:23

## 2021-12-06 RX ADMIN — LIDOCAINE HYDROCHLORIDE 4 ML: 10 INJECTION, SOLUTION INFILTRATION; PERINEURAL at 09:44

## 2021-12-06 RX ADMIN — SODIUM CHLORIDE, PRESERVATIVE FREE 10 ML: 5 INJECTION INTRAVENOUS at 08:41

## 2021-12-06 RX ADMIN — FENTANYL CITRATE 50 MCG: 50 INJECTION, SOLUTION INTRAMUSCULAR; INTRAVENOUS at 09:43

## 2021-12-06 RX ADMIN — HYDROCODONE BITARTRATE AND ACETAMINOPHEN 1 TABLET: 5; 325 TABLET ORAL at 21:25

## 2021-12-06 NOTE — NURSING NOTE
Pt moved self from bed onto CT procedure table into supine position, feet first into the scanner. Pt placed on cardiac monitor and 2L oxygen via N/C for conscious sedation procedure.

## 2021-12-06 NOTE — CASE MANAGEMENT/SOCIAL WORK
Discharge Planning Assessment   Alok     Patient Name: Carmella Pelayo  MRN: [id number]  Today's Date: 12/6/2021    Admit Date: 12/4/2021     Discharge Needs Assessment     Row Name 12/06/21 1552       Living Environment    Lives With alone    Current Living Arrangements home/apartment/condo    Primary Care Provided by self    Provides Primary Care For no one, unable/limited ability to care for self    Family Caregiver if Needed child(melvina), adult    Quality of Family Relationships helpful    Able to Return to Prior Arrangements yes       Resource/Environmental Concerns    Resource/Environmental Concerns none    Transportation Concerns car, none       Transition Planning    Patient/Family Anticipates Transition to home with help/services    Patient/Family Anticipated Services at Transition ; home health care    Transportation Anticipated family or friend will provide       Discharge Needs Assessment    Readmission Within the Last 30 Days other (see comments)  previous admit 11/18-11/21    Current Outpatient/Agency/Support Group homecare agency    Concerns to be Addressed discharge planning    Anticipated Changes Related to Illness none    Discharge Facility/Level of Care Needs home with home health    Provided Post Acute Provider List? N/A    N/A Provider List Comment [facility]    Current Discharge Risk chronically ill; lives alone               Discharge Plan     Row Name 12/06/21 1558       Plan    Plan Anticipate routine home, current with [facility], IVANA order placed. Referral to [facility] for IV abx, pending approval.    Patient/Family in Agreement with Plan yes    Plan Comments Met with patient and family at bedside. Recent admit 11/18-11/21, discharged to [facility] for 2 week and was only home for a few hours prior to being readmitted to the hospital. Patient reports she was sent home with  but unsure of the agency name. Upon chart review patient was previous with  [facility] prior to her previous encounter. Confirmed with Sara at [facility] patient [facility]. Informed Dolores liaison and IVANA order placed. Discussed with patient the need for IV abx on d/c for 4 weeks, she is agreeable to complete these at home. She stated she felt comfortable being taught how to adminster this at home when HH is not there. Family is in agreeance at bedside. Informed Dolores mclainison. Referral to Mary with [facility], pending approval. Patient stated she is supposed to have Francia Disla see her 2x/week for caregiver services. Friend to transport home on d/c.              Continued Care and Services - Admitted Since 12/4/2021     Dialysis/Infusion     Service Provider Request Status Selected Services Address Phone Fax Patient Preferred    [facility]  Pending - No Request Sent N/A [address], [facility] [address] [phone] --          Home Medical Care     Service Provider Request Status Selected Services Address Phone Fax Patient Preferred    [facility] - [place]  Pending - Request Sent N/A [address] [place] [address] [phone] --                 Expected Discharge Date and Time     Expected Discharge Date Expected Discharge Time    Dec 9, 2021          Demographic Summary     Row Name 12/06/21 1552       General Information    Admission Type inpatient    Arrived From emergency department    Required Notices Provided Important Message from Medicare    Referral Source admission list    Reason for Consult discharge planning    Preferred Language English               Functional Status     Row Name 12/06/21 1552       Functional Status    Usual Activity Tolerance moderate    Current Activity Tolerance fair       Functional Status, IADL    Medications independent    Meal Preparation independent    Housekeeping independent    Laundry independent     Shopping independent                    Patient Forms     Row Name 12/06/21 1556       Patient Forms    Important Message from Medicare ([facility]) --  IM delivered 12/6            Met with patient in room wearing PPE: mask    Maintained distance greater than six feet and spent less than 15 minutes in the room.            Jaleesa Leone RN

## 2021-12-06 NOTE — NURSING NOTE
Dr. Andrade is using CT image guidance to locate abscess in left abdomen and isela area for drain placement.

## 2021-12-06 NOTE — NURSING NOTE
WOCN note:    66 yr old female admitted 12/4/2021 with abscess of left hip and history of vulvar cancer. WOCN consult received for perineal skin care recommendations.     Patient presents with wounds to bilateral vulvectomy sites with constant drainage of clear effluent. The tissues are indurated and painful. The skin is red and denuded in areas from her inner thighs to her buttocks.     Discussed moisture and pain control measures with patient including sitz baths and absorptive materials which she has used at home.   Recommend [place]'s 1:20 soaks to the area 3 times daily with moisture barrier to the buttocks and thighs and soft gauze to the vulva wounds. Will continue to follow as needed.

## 2021-12-06 NOTE — PROGRESS NOTES
"[facility] PROGRESS NOTE     LOS: 2 days    Patient Care Team:  Deana Wallace MD as PCP - General (Family Medicine)      Subjective     Patient resting comfortably  S/p percutaneous abdominal drain placement by IR  Patient denies any vomiting or diarrhea    Objective     Vital Sign Min/Max for last 24 hours  Temp:  [98.1 °F (36.7 °C)-99.7 °F (37.6 °C)] 98.1 °F (36.7 °C)  Heart Rate:  [66-96] 82  Resp:  [10-17] 17  BP: (113-153)/(57-82) 124/79                       Flowsheet Rows      First Filed Value   Admission Height 154.9 cm (61\") Documented at 12/04/2021 0336   Admission Weight 55 kg (121 lb 4.1 oz) Documented at 12/04/2021 0336          I/O this shift:  In: 120 [P.O.:120]  Out: -   I/O last 3 completed shifts:  In: 480 [P.O.:480]  Out: 595 [Urine:550; Drains:45]    Physical Exam:  Physical Exam    General Appearance: alert, oriented x 3, no acute distress   Skin: warm and dry  HEENT: oral mucosa normal, nonicteric sclera  Neck: supple, no JVD  Lungs: CTA  Heart: RRR, normal S1 and S2  Abdomen: soft, nontender, nondistended.  Drain+ LLQ  : no palpable bladder  Extremities: no edema, cyanosis or clubbing  Neuro: normal speech and mental status        LABS:  Lab Results   Component Value Date    CALCIUM 7.9 (L) 12/06/2021     Results from last 7 days   Lab Units 12/06/21  0514 12/05/21  2208 12/05/21  1116 12/05/21  0347 12/05/21  0347 12/04/21  1335 12/04/21  1335 12/04/21  0422 12/04/21  0422   MAGNESIUM mg/dL  --   --   --   --  2.2  --   --   --  1.3*   SODIUM mmol/L 141  --   --   --  140  --  141   < > 138   POTASSIUM mmol/L 2.9* 3.2* 3.1*   < > 2.1*   < > 2.5*   < > 2.3*   CHLORIDE mmol/L 102  --   --   --  98  --  99   < > 94*   CO2 mmol/L 30.0*  --   --   --  32.0*  --  31.0*   < > 31.0*   BUN mg/dL 3*  --   --   --  5*  --  5*   < > 5*   CREATININE mg/dL 0.35*  --   --   --  0.46*  --  0.38*   < > 0.43*   GLUCOSE mg/dL 95  --   --    < > 100*   < > 140*   < > 154*   CALCIUM mg/dL 7.9*  --   " --   --  7.8*  --  7.9*   < > 8.6   WBC 10*3/mm3 7.50  --   --   --  7.60  --   --   --  8.60   HEMOGLOBIN g/dL 9.0*  --   --   --  8.6*  --   --   --  9.7*   PLATELETS 10*3/mm3 258  --   --   --  262  --   --   --  312   ALT (SGPT) U/L <5  --   --   --   --   --   --   --  8   AST (SGOT) U/L 10  --   --   --   --   --   --   --  7    < > = values in this interval not displayed.     No results found for: CKTOTAL, CKMB, CKMBINDEX, TROPONINI, TROPONINT  Estimated Creatinine Clearance: 57.2 mL/min (A) (by C-G formula based on SCr of 0.35 mg/dL (L)).      Brief Urine Lab Results  (Last result in the past 365 days)      Color   Clarity   Blood   Leuk Est   Nitrite   Protein   CREAT   Urine HCG        12/04/21 0548 Yellow   Clear   Small (1+)   Small (1+)   Negative   Negative               WEIGHTS:     Wt Readings from Last 1 Encounters:   12/06/21 0525 59.2 kg (130 lb 8.2 oz)   12/05/21 0400 57.1 kg (125 lb 14.4 oz)   12/04/21 0336 55 kg (121 lb 4.1 oz)       cefTRIAXone, 2 g, Intravenous, Q24H  folic acid, 1 mg, Oral, Daily  insulin lispro, 0-7 Units, Subcutaneous, Q6H  metroNIDAZOLE, 500 mg, Oral, Q8H  sodium chloride, 10 mL, Intravenous, Q12H      sodium chloride 0.9 % with KCl 20 mEq, 50 mL/hr, Last Rate: 50 mL/hr (12/06/21 1133)        Assessment/Plan       1.  Hypokalemia  Unclear etiology at this time.  Patient not on diuretics.  Magnesium level okay  Order urine electrolytes and osmolality to calculate transtubular potassium gradient  Continue oral potassium replacement per protocol    2.  Intra-abdominal abscess  S/p drain placement by IR  On IV antibiotics per infectious disease following      Odin Cazares MD  12/06/21  14:29 EST

## 2021-12-06 NOTE — CONSULTS
Hematology/Oncology Inpatient Consultation    Patient name: Carmella Pelayo  : 1955  MRN: [id number]  Referring Provider: Dr.George Cruz  Reason for Consultation: Possible recurrent vulvar cancer    Chief complaint: lower abdominal pain and left hip pain    History of present illness:    Carmella Pelayo is a 66 y.o. female who presented to [facility] on 2021 with left lower abdominal, left hip and leg pain accompanied with edema to left leg.  Pain is increased when standing and is constant.   Patient states pain returned a couple of days ago. Describes pain as tight band around her left lower abdomen. She can barely stand or apply pressure on left leg and is unable to walk.  Denies fever, nausea, vomiting, or diarrhea. Admits to decreased oral intake because nothing taste good since being on ABT.  Admits to hematuria this am. Patient had s/p percutaneous drain placement for left lower quadrant abdominal abscess .  Cultures grew group B streptococcus.  She was discharged to rehab on 21 on 2 weeks of IV rocephin and oral flagyl.  Patient was discharged from rehab yesterday. Percutaneous drain still intact and has not increased or changed in output or color.    In the ED patient had CT abdomen that showed evolving left peritoneal/retroperitoneal abscess and soft tissue phlegmon.  Surgical drain is present.  Larger components of this abscess collection appear significantly decreased compared to prior.  Small irregular lobulations in the left lower quadrant and along the left peritoneal wall persist and are not included communication with the drain.  Redemonstrated soft tissue defect and skin thickening in the perineum compatible with known vulvar cancer.  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  New enhancing crescentic fluid collection posterior to the  greater trochanter which could reflect bursitis versus abscess.  Permeative changes in the left iliac wing and left ischium concerning for active bony destruction.  Given adjacent soft tissue infection, findings are concerning for osteomyelitis in this location.  WBC normal, afebrile, tachycardic low 100s, not hypotensive.  CMP showed potassium 2.3.  She was started on IV cefepime and vancomycin. She was admitted for further treatment of left hip abscess, possible osteomyelitis of left iliac wing and ischium. ID will be consulted.    She has recurrent vulvar cancer s/p vulvectomy, radiation, and chemotherapy no longer a hospice patient but not receiving any current treatment for her cancer. She has chronic perirectal wounds from previous vulvectomy surgery 2019. She follows up with Dr.Rosaline Pillai.    12/06/21  Hematology/Oncology was consulted for possible recurrent vulvar cancer.  Patient is well-known to our clinic and is currently following with Dr.Rosaline Pillai. Patient had previously decided for comfort measures only and was followed by [facility] care. Patient states that she is no longer with Hospice care.  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  S/p left radical Jorge vulvectomy, and left inguinal LND on 4/18/2019.  Patient declined adjuvant radiation with weekly cisplatin.  New left-sided vulvar lesion recurrent disease 2/23/2020.  EUA, cystoscopy and vulvar biopsy 11/12/2020 reveals squamous cell carcinoma invasive, of vulva.  PET/CT 12/7/2020 shows recurrent vulvar squamous cell cancer at site of biopsy no regional or distant metastasis disease.  Keytruda started December 2020, development of pneumonitis 3/17/2021.  Personal decision made in August 2021 to forego aggressive treatment of her disease and focus on quality of life issues per Dr. Borja 9/22/21 note.   CT abdomen and pelvis was completed on 12/4/21 which showed then original normal abscess was significantly decreased and  shows some small irregular lobulations of the left lower quadrant and along the left peritoneal wall do not communicate with the drain. 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  There is new fluid collection posterior to the greater trochanter which could be bursitis versus abscess.  There is also changes in the left iliac vein and left ischium concerning for possible osteomyelitis.  She is currently receiving IV ABT.  No deep pelvic or inguinal adenopathy.           I have reviewed and confirmed the accuracy of the patient's history: Chief complaint, HPI, ROS and Subjective as entered by the MA/LPN/RN. Shital Lainey Pillai MD 21     She  has a past medical history of Diabetes mellitus (HCC) and Vulva cancer (HCC).    PCP: Deana Wallace MD    History:  Past Medical History:   Diagnosis Date   • Diabetes mellitus (HCC)    • Vulva cancer (HCC)    ,   Past Surgical History:   Procedure Laterality Date   • VULVECTOMY     , No family history on file.,   Social History     Tobacco Use   • Smoking status: Never Smoker   • Smokeless tobacco: Never Used   Substance Use Topics   • Alcohol use: Not Currently   • Drug use: Not Currently   ,   Medications Prior to Admission   Medication Sig Dispense Refill Last Dose   • [] metroNIDAZOLE (FLAGYL) 500 MG tablet Take 1 tablet by mouth Every 8 (Eight) Hours for 35 doses. Indications: Infection Within the Abdomen 35 tablet 0    , Scheduled Meds:  aluminum sulfate-calcium acetate 1:20, 1,000 mL, Topical, Q8H  cefTRIAXone, 2 g, Intravenous, Q24H  folic acid, 1 mg, Oral, Daily  insulin lispro, 0-7 Units, Subcutaneous, Q6H  metroNIDAZOLE, 500 mg, Oral, Q8H  sodium chloride, 10 mL, Intravenous, Q12H    , Continuous Infusions:  sodium chloride 0.9 % with KCl 20 mEq, 50 mL/hr, Last Rate: 50 mL/hr (21 0130)    , PRN Meds:  •  acetaminophen **OR** acetaminophen **OR** acetaminophen  •  aluminum-magnesium hydroxide-simethicone  •  Calcium Gluconate-NaCl **AND**  calcium gluconate **AND** Calcium, Ionized  •  dextrose  •  dextrose  •  glucagon (human recombinant)  •  HYDROcodone-acetaminophen  •  HYDROcodone-acetaminophen  •  insulin lispro **AND** insulin lispro  •  magnesium sulfate **OR** magnesium sulfate **OR** magnesium sulfate  •  melatonin  •  ondansetron **OR** ondansetron  •  potassium & sodium phosphates **OR** potassium & sodium phosphates  •  potassium chloride **OR** potassium chloride **OR** potassium chloride  •  [COMPLETED] Insert peripheral IV **AND** sodium chloride  •  sodium chloride   Allergies:  Meperidine and Morphine    Subjective     ROS:  Review of Systems   Constitutional: Positive for fatigue. Negative for activity change, appetite change, chills, diaphoresis, fever and unexpected weight change.   HENT: Negative for congestion, dental problem, ear discharge, ear pain, facial swelling, hearing loss, mouth sores, nosebleeds, sore throat, tinnitus, trouble swallowing and voice change.    Eyes: Negative for photophobia and visual disturbance.   Respiratory: Negative for cough, choking, chest tightness, shortness of breath and wheezing.    Cardiovascular: Positive for leg swelling. Negative for chest pain and palpitations.   Gastrointestinal: Negative for abdominal distention, abdominal pain, blood in stool, constipation, diarrhea, nausea and vomiting.   Endocrine: Negative.    Genitourinary: Negative for decreased urine volume, difficulty urinating, dysuria, flank pain, frequency, hematuria and urgency.   Musculoskeletal: Positive for myalgias. Negative for back pain, gait problem, joint swelling, neck pain and neck stiffness.        Left hip pain, abdominal pain   Skin: Positive for wound. Negative for color change and rash.        Left hip wound. Left sided pelvic wound. Percutaneous drain.    Neurological: Positive for weakness. Negative for dizziness, tremors, syncope, speech difficulty, numbness and headaches.   Hematological: Negative.   "  Psychiatric/Behavioral: Negative.         Objective   Vital Signs:   /71 (BP Location: Left arm, Patient Position: Lying)   Pulse 80   Temp 98.1 °F (36.7 °C) (Oral)   Resp 16   Ht 154.9 cm (61\")   Wt 59.6 kg (131 lb 5.3 oz)   SpO2 98%   BMI 24.81 kg/m²     Physical Exam: (performed by MD)  Physical Exam  Vitals and nursing note reviewed. Exam conducted with a chaperone present.   Constitutional:       General: She is not in acute distress.     Appearance: Normal appearance. She is not diaphoretic.   HENT:      Head: Normocephalic and atraumatic.      Right Ear: Tympanic membrane, ear canal and external ear normal.      Left Ear: Tympanic membrane, ear canal and external ear normal.      Nose: Nose normal.      Mouth/Throat:      Mouth: Mucous membranes are moist.      Pharynx: Oropharynx is clear.   Eyes:      Extraocular Movements: Extraocular movements intact.      Conjunctiva/sclera: Conjunctivae normal.      Pupils: Pupils are equal, round, and reactive to light.   Cardiovascular:      Rate and Rhythm: Normal rate and regular rhythm.      Heart sounds: No murmur heard.  No friction rub. No gallop.    Pulmonary:      Effort: Pulmonary effort is normal. No respiratory distress.      Breath sounds: Normal breath sounds. No rhonchi or rales.   Chest:      Chest wall: No tenderness.   Abdominal:      General: Abdomen is flat. Bowel sounds are normal. There is no distension.      Palpations: Abdomen is soft.      Tenderness: There is no abdominal tenderness. There is no rebound.      Hernia: No hernia is present.   Genitourinary:     Comments: deferred  Musculoskeletal:         General: No swelling, tenderness or signs of injury. Normal range of motion.      Cervical back: Normal range of motion and neck supple.      Right lower leg: No edema.      Left lower leg: No edema.   Skin:     General: Skin is warm and dry.      Capillary Refill: Capillary refill takes less than 2 seconds.      Comments:  Left " sided pelvic wound. Percutaneous drain. Perirectal/coccyx wounds with dermatitis.    Neurological:      General: No focal deficit present.      Mental Status: She is alert and oriented to person, place, and time.      Motor: No weakness.      Coordination: Coordination normal.      Gait: Gait normal.      Deep Tendon Reflexes: Reflexes normal.   Psychiatric:         Mood and Affect: Mood normal.         Behavior: Behavior normal.         Thought Content: Thought content normal.         Judgment: Judgment normal.     I have reexamined the patient and the results are consistent with the previously documented exam. Shital Pillai MD     Results Review:  Lab Results (last 48 hours)     Procedure Component Value Units Date/Time    Comprehensive Metabolic Panel [[id number]]  (Abnormal) Collected: 12/06/21 0514    Specimen: Blood Updated: 12/06/21 0721     Glucose 95 mg/dL      BUN 3 mg/dL      Creatinine 0.35 mg/dL      Sodium 141 mmol/L      Potassium 2.9 mmol/L      Chloride 102 mmol/L      CO2 30.0 mmol/L      Calcium 7.9 mg/dL      Total Protein 6.7 g/dL      Albumin 2.30 g/dL      ALT (SGPT) <5 U/L      AST (SGOT) 10 U/L      Alkaline Phosphatase 252 U/L      Total Bilirubin 0.2 mg/dL      eGFR Non African Amer >150 mL/min/1.73      Globulin 4.4 gm/dL      A/G Ratio 0.5 g/dL      BUN/Creatinine Ratio 8.6     Anion Gap 9.0 mmol/L     Narrative:      GFR Normal >60  Chronic Kidney Disease <60  Kidney Failure <15      aPTT [[id number]]  (Normal) Collected: 12/06/21 0514    Specimen: Blood Updated: 12/06/21 0659     PTT 28.1 seconds     Protime-INR [[id number]]  (Abnormal) Collected: 12/06/21 0514    Specimen: Blood Updated: 12/06/21 0659     Protime 11.9 Seconds      INR 1.08    CBC & Differential [[id number]]  (Abnormal) Collected: 12/06/21 0514    Specimen: Blood Updated: 12/06/21 0637    Narrative:      The following orders were created for panel order CBC & Differential.  Procedure                                Abnormality         Status                     ---------                               -----------         ------                     CBC Auto Differential[[id number]]        Abnormal            Final result                 Please view results for these tests on the individual orders.    CBC Auto Differential [[id number]]  (Abnormal) Collected: 12/06/21 0514    Specimen: Blood Updated: 12/06/21 0637     WBC 7.50 10*3/mm3      RBC 3.62 10*6/mm3      Hemoglobin 9.0 g/dL      Hematocrit 28.4 %      MCV 78.3 fL      MCH 24.9 pg      MCHC 31.8 g/dL      RDW 21.6 %      RDW-SD 58.6 fl      MPV 7.8 fL      Platelets 258 10*3/mm3      Neutrophil % 70.9 %      Lymphocyte % 13.5 %      Monocyte % 8.8 %      Eosinophil % 6.3 %      Basophil % 0.5 %      Neutrophils, Absolute 5.30 10*3/mm3      Lymphocytes, Absolute 1.00 10*3/mm3      Monocytes, Absolute 0.70 10*3/mm3      Eosinophils, Absolute 0.50 10*3/mm3      Basophils, Absolute 0.00 10*3/mm3      nRBC 0.0 /100 WBC     POC Glucose Once [[id number]]  (Abnormal) Collected: 12/06/21 0540    Specimen: Blood Updated: 12/06/21 0541     Glucose 111 mg/dL      Comment: Serial Number: [id number]:  [id number]       POC Glucose Once [[id number]]  (Abnormal) Collected: 12/06/21 0055    Specimen: Blood Updated: 12/06/21 0056     Glucose 157 mg/dL      Comment: Serial Number: [id number]:  [id number]       Potassium [[id number]]  (Abnormal) Collected: 12/05/21 2208    Specimen: Blood Updated: 12/05/21 2249     Potassium 3.2 mmol/L     POC Glucose Once [[id number]]  (Abnormal) Collected: 12/05/21 1659    Specimen: Blood Updated: 12/05/21 1700     Glucose 141 mg/dL      Comment: Serial Number: [id number]:  [id number]       Urine Culture - Urine, Urine, Clean Catch [[id number]]  (Abnormal) Collected: 12/04/21 0548    Specimen: Urine, Clean Catch Updated: 12/05/21 1323     Urine Culture Yeast isolated     Comment: No further workup.           <25,000 CFU/mL Mixed Noelle Isolated     Narrative:      Specimen contains mixed organisms of questionable pathogenicity which indicates contamination with commensal juwan.  Further identification is unlikely to provide clinically useful information.  Suggest recollection.    POC Glucose Once [[id number]]  (Abnormal) Collected: 12/05/21 1235    Specimen: Blood Updated: 12/05/21 1236     Glucose 126 mg/dL      Comment: Serial Number: [id number]:  [id number]       Potassium [[id number]]  (Abnormal) Collected: 12/05/21 1116    Specimen: Blood Updated: 12/05/21 1146     Potassium 3.1 mmol/L     Blood Culture - Blood, Arm, Right [[id number]]  (Normal) Collected: 12/04/21 0928    Specimen: Blood from Arm, Right Updated: 12/05/21 1116     Blood Culture No growth at 24 hours    Blood Culture - Blood, Arm, Left [[id number]]  (Normal) Collected: 12/04/21 0931    Specimen: Blood from Arm, Left Updated: 12/05/21 1000     Blood Culture No growth at 24 hours    Magnesium [[id number]]  (Normal) Collected: 12/05/21 0347    Specimen: Blood Updated: 12/05/21 0548     Magnesium 2.2 mg/dL     POC Glucose Once [[id number]]  (Abnormal) Collected: 12/05/21 0521    Specimen: Blood Updated: 12/05/21 0522     Glucose 119 mg/dL      Comment: Serial Number: [id number]:  [id number]       Basic Metabolic Panel [[id number]]  (Abnormal) Collected: 12/05/21 0347    Specimen: Blood Updated: 12/05/21 0503     Glucose 100 mg/dL      BUN 5 mg/dL      Creatinine 0.46 mg/dL      Sodium 140 mmol/L      Potassium 2.1 mmol/L      Chloride 98 mmol/L      CO2 32.0 mmol/L      Calcium 7.8 mg/dL      eGFR Non African Amer 136 mL/min/1.73      BUN/Creatinine Ratio 10.9     Anion Gap 10.0 mmol/L     Narrative:      GFR Normal >60  Chronic Kidney Disease <60  Kidney Failure <15      CBC Auto Differential [[id number]]  (Abnormal) Collected: 12/05/21 0347    Specimen: Blood Updated: 12/05/21 0433     WBC 7.60 10*3/mm3      RBC 3.47 10*6/mm3      Hemoglobin 8.6 g/dL      Hematocrit 26.7 %      MCV 77.0  fL      MCH 24.8 pg      MCHC 32.3 g/dL      RDW 21.3 %      RDW-SD 56.9 fl      MPV 7.1 fL      Platelets 262 10*3/mm3      Neutrophil % 67.6 %      Lymphocyte % 16.9 %      Monocyte % 10.1 %      Eosinophil % 4.8 %      Basophil % 0.6 %      Neutrophils, Absolute 5.20 10*3/mm3      Lymphocytes, Absolute 1.30 10*3/mm3      Monocytes, Absolute 0.80 10*3/mm3      Eosinophils, Absolute 0.40 10*3/mm3      Basophils, Absolute 0.00 10*3/mm3      nRBC 0.1 /100 WBC     POC Glucose Once [[id number]]  (Abnormal) Collected: 12/05/21 0008    Specimen: Blood Updated: 12/05/21 0010     Glucose 192 mg/dL      Comment: Serial Number: [id number]:  [id number]       POC Glucose Once [[id number]]  (Abnormal) Collected: 12/04/21 2031    Specimen: Blood Updated: 12/04/21 2033     Glucose 143 mg/dL      Comment: Serial Number: [id number]:  [id number]       POC Glucose Once [[id number]]  (Abnormal) Collected: 12/04/21 1639    Specimen: Blood Updated: 12/04/21 1641     Glucose 212 mg/dL      Comment: Serial Number: [id number]:  [id number]       Basic Metabolic Panel [[id number]]  (Abnormal) Collected: 12/04/21 1335    Specimen: Blood Updated: 12/04/21 1416     Glucose 140 mg/dL      BUN 5 mg/dL      Creatinine 0.38 mg/dL      Sodium 141 mmol/L      Potassium 2.5 mmol/L      Comment: Slight hemolysis detected by analyzer. Results may be affected.        Chloride 99 mmol/L      CO2 31.0 mmol/L      Calcium 7.9 mg/dL      eGFR Non African Amer >150 mL/min/1.73      BUN/Creatinine Ratio 13.2     Anion Gap 11.0 mmol/L     Narrative:      GFR Normal >60  Chronic Kidney Disease <60  Kidney Failure <15      POC Glucose Once [[id number]]  (Abnormal) Collected: 12/04/21 1034    Specimen: Blood Updated: 12/04/21 1035     Glucose 153 mg/dL      Comment: Serial Number: [id number]:  [id number]       Procalcitonin [[id number]]  (Normal) Collected: 12/04/21 0422    Specimen: Blood Updated: 12/04/21 0927     Procalcitonin 0.12 ng/mL      "Narrative:      As a Marker for Sepsis (Non-Neonates):     1. <0.5 ng/mL represents a low risk of severe sepsis and/or septic shock.  2. >2 ng/mL represents a high risk of severe sepsis and/or septic shock.    As a Marker for Lower Respiratory Tract Infections that require antibiotic therapy:  PCT on Admission     Antibiotic Therapy             6-12 Hrs later  >0.5                          Strongly Recommended            >0.25 - <0.5             Recommended  0.1 - 0.25                  Discouraged                       Remeasure/reassess PCT  <0.1                         Strongly Discouraged         Remeasure/reassess PCT      As 28 day mortality risk marker: \"Change in Procalcitonin Result\" (>80% or <=80%) if Day 0 (or Day 1) and Day 4 values are available. Refer to http://[email or web address]-calculator.com/    Change in PCT <=80 %   A decrease of PCT levels below or equal to 80% defines a positive change in PCT test result representing a higher risk for 28-day all-cause mortality of patients diagnosed with severe sepsis or septic shock.    Change in PCT >80 %   A decrease of PCT levels of more than 80% defines a negative change in PCT result representing a lower risk for 28-day all-cause mortality of patients diagnosed with severe sepsis or septic shock.                C-reactive Protein [[id number]]  (Abnormal) Collected: 12/04/21 0422    Specimen: Blood Updated: 12/04/21 0920     C-Reactive Protein 6.83 mg/dL            Pending Results:   Imaging Reviewed:   CT Abdomen Pelvis With Contrast    Result Date: 12/4/2021  Impression: 1. Evolving left peritoneal/retroperitoneal abscess and soft tissue phlegmon. A surgical drain is present. The larger components of this abscess collection appear significantly decreased compared to prior. Some small irregular lobulations in the left lower quadrant and along the left peritoneal wall persist and are not included communication with the drain. 2. Redemonstrated soft tissue defect " and skin thickening in the perineum compatible with known vulvar cancer. 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 3. New enhancing crescentic fluid collection posterior to the greater trochanter which could reflect bursitis versus abscess. 4. Permeative changes in the left iliac wing and left ischium concerning for active bony destruction. Given adjacent soft tissue infection, findings are concerning for osteomyelitis in this location. Electronically signed by:  Aman Martínez M.D.  12/4/2021 4:48 AM    CT Guided Abscess Drain Peritoneal    Result Date: 12/6/2021  IMPRESSION : Placement of an additional 8 Tajik pigtail drain catheter for drainage of abscess within the left lateral abdominal musculature, as described above.  Electronically Signed By-Shane Andrade MD On:12/6/2021 10:58 AM This report was finalized on [id number] by  Shane Andrade MD.           Assessment/Plan      ASSESSMENT    Recurrent vulvar cancer: Stage Ib grade 2 SCC of the vulva.  S/p left radical Jorge vulvectomy, and left inguinal LND on 4/18/2019.  Patient declined adjuvant radiation with weekly cisplatin.  New left-sided vulvar lesion recurrent disease 2/23/2020.  EUA, cystoscopy and vulvar biopsy 11/12/2020 reveals squamous cell carcinoma invasive, of vulva.  PET/CT 12/7/2020 shows recurrent vulvar squamous cell cancer at site of biopsy no regional or distant metastasis disease.  Keytruda started December 2020, development of pneumonitis 3/17/2021.  Personal decision made in August 2021 to forego aggressive treatment of her disease and focus on quality of life issues per Dr. Borja 9/22/21 note.  She was transitioned into hospice care but discharged as she wanted treatment for her pelvic abscess.  Patient is considering potentially treatment in the future if and when her infection resolves       Chronic perirectal  wounds from previous vulvectomy surgery in 2019 along with moisture associated dermatitis.  Wound cultures from 11/20/21 grew group B streptococcus.      Large intra-abdominal abscess of left lower quadrant, s/p percutaneous drain placement 11/18/2021:  Recurrent abscess or rectocutaneous fistula.  Fluid culture collected from percutaneous drain on 12/3/21 from  office.  CT abdomen and pelvis was completed on 12/4/21 which showed then original normal abscess was significantly decreased and shows some small irregular lobulations of the left lower quadrant and along the left peritoneal wall do not communicate with the drain. 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  There is new fluid collection posterior to the greater trochanter which could be bursitis versus abscess.  There is also changes in the left iliac vein and left ischium concerning for possible osteomyelitis.  She is currently receiving IV ABT.  Patient was discharged on 11/21/21 to rehab to complete 2 week of IV rocephin and oral flagyl after her fluid culture grew group B streptococcus.  Infectious disease and general surgery consulted.Cultures are pending.  New drainage tube placed    Abscess left hip: followed by ID    Osteomyelitis of left side of pelvis: followed by ID    Leukocytosis: resolved.      Thrombocytosis: Platelet 535, today 443. Will continue to monitor CBC daily     Anemia: hemoglobin 9.0  Related to disease process, immunotherapy, chronic illness.   iron level 16.      Folate deficiency: previous Folate level 4.09 Continue Folic acid 1 mg daily    Hypokalemia/Hypomagnesiumia:   Replete per primary     Other chronic conditions: DM type II, dermatitis associated with moisture, urinary tract infection,     PLANS:     1. Continue IV abt  2. IR to replace percutaneous drain today  3. Follow with general surgery. Wound nurse and Infectious disease  4. Wound culture pending.  5. CBC/diff daily  6. Continue Folic acid 1mg po  daily  7. Continue Supportive care  8. Will continue to follow              Thank you for this consult. We will be happy to follow along with you.       Electronically signed by MANISH Palencia, 12/06/21, 10:20 AM EST.    Patient seen and examined.  Face-to-face evaluation completed.  Note reviewed and edited.  Discussed with nurse practitioner.  I agree with assessment and plan as documented above.  Continue IV antibiotics.  Continue drainage for pelvic abscess.  She is not a candidate for any systemic treatment for her locally advanced vulvar carcinoma at this time due to concurrent infection.  Discussed with patient we will continue to follow.      Electronically signed by Shital Pillai MD, 12/07/21, 8:01 AM EST.

## 2021-12-06 NOTE — NURSING NOTE
Local dressings applied to both abscess drain sites in left abdomen of biopatch, sorbaview, 4x4's, and tegaderm. Dressings are both labeled, dry and intact.

## 2021-12-06 NOTE — POST-PROCEDURE NOTE
IR POST OP NOTE    Procedure:CT guided abd wall abscess drain plaement      Pre Op DX: Residual separate abscess s/p prior drainage cath placement      Post Op DX:same      Anesthesia: Local, CS      Findings:8fr drain placed into residual L subfascial abscess collection. Approximately 10ml grossly purulent material aspirated. Collection appears smaller than on recent CT.       Complications:No immediate.       Provider Signature: Dr. Shane Andrade

## 2021-12-06 NOTE — DISCHARGE PLACEMENT REQUEST
"Carmella Pelayo (66 y.o. Female)             Date of Birth Social Security Number Address Home Phone MRN    1955  [id number] [place] [facility] [facility] [phone] [id number]    Gnosticism Marital Status             Judaism Single       Admission Date Admission Type Admitting Provider Attending Provider Department, Room/Bed    12/4/21 Emergency Rakel Stoddard DO Patel, Jalaram, DO [facility] 2C MEDICAL INPATIENT, 250/1    Discharge Date Discharge Disposition Discharge Destination                         Attending Provider: Rakel Stoddard DO    Allergies: Meperidine, Morphine    Isolation: None   Infection: None   Code Status: CPR   Advance Care Planning Activity    Ht: 154.9 cm (61\")   Wt: 59.2 kg (130 lb 8.2 oz)    Admission Cmt: None   Principal Problem: Abscess of left hip [L02.416]                 Active Insurance as of 12/4/2021     Primary Coverage     Payor Plan Insurance Group Employer/Plan Group    MEDICARE MEDICARE A & B      Payor Plan Address Payor Plan Phone Number Payor Plan Fax Number Effective Dates    [address] [phone]  9/1/2020 - None Entered    [facility] [id number]       Subscriber Name Subscriber Birth Date Member ID       CARMELLA PELAYO 1955 [id number]           Secondary Coverage     Payor Plan Insurance Group Employer/Plan Group    [place]      Payor Plan Address Payor Plan Phone Number Payor Plan Fax Number Effective Dates    [address]   11/1/2021 - None Entered    [place] [id number]       Subscriber Name Subscriber Birth Date Member ID       CARMELLA PELAYO 1955 [id number]                 Emergency Contacts      (Rel.) Home Phone Work Phone Mobile Phone    magalie horan (Daughter) -- -- [phone]            "

## 2021-12-06 NOTE — NURSING NOTE
Dr. Andrade dilated track and successfully placed an 8F abscession drain in pt's left upper abdomen. CT imaging to confirm proper placement taking place.

## 2021-12-06 NOTE — PLAN OF CARE
Goal Outcome Evaluation:  Plan of Care Reviewed With: patient           Outcome Summary: Pt has slept well throughout the night once able to get more comfortable with the help of pain medication. Called Dr. Blandon regarding pt's pain, Norco 5mg q 4hrs PRN ordered. Pt is expected to have another drain placed this AM, IR has been consulted for placement. Pt remains NPO. Will continue to monitor.

## 2021-12-06 NOTE — PROGRESS NOTES
Infectious Diseases Progress Note      LOS: 2 days   Patient Care Team:  Deana Wallace MD as PCP - General (Family Medicine)    Chief Complaint: Weakness    Subjective     The patient had no fever during the last 24 hours. She remained hemodynamically stable.  The patient is s/p new drain placed by IR today    Review of Systems:   Review of Systems   Constitutional: Negative.    HENT: Negative.    Eyes: Negative.    Respiratory: Negative.    Cardiovascular: Negative.    Gastrointestinal: Negative.    Genitourinary: Negative.    Musculoskeletal: Negative.    Skin: Negative.    Neurological: Negative.    Hematological: Negative.    Psychiatric/Behavioral: Negative.         Objective     Vital Signs  Temp:  [98.1 °F (36.7 °C)-99.7 °F (37.6 °C)] 98.1 °F (36.7 °C)  Heart Rate:  [66-96] 82  Resp:  [10-17] 17  BP: (113-153)/(57-82) 124/79    Physical Exam:  Physical Exam  Vitals and nursing note reviewed.   Constitutional:       Appearance: She is well-developed.   HENT:      Head: Normocephalic and atraumatic.   Eyes:      Pupils: Pupils are equal, round, and reactive to light.   Cardiovascular:      Rate and Rhythm: Normal rate and regular rhythm.      Heart sounds: Normal heart sounds.   Pulmonary:      Effort: Pulmonary effort is normal. No respiratory distress.      Breath sounds: Normal breath sounds. No wheezing or rales.   Abdominal:      General: Bowel sounds are normal. There is no distension.      Palpations: Abdomen is soft. There is no mass.      Tenderness: There is no abdominal tenderness. There is no guarding or rebound.   Musculoskeletal:         General: No deformity. Normal range of motion.      Cervical back: Normal range of motion and neck supple.   Skin:     General: Skin is warm.      Findings: No erythema or rash.   Neurological:      Mental Status: She is alert and oriented to person, place, and time.      Cranial Nerves: No cranial nerve deficit.          Results Review:    I have reviewed  all clinical data, test, lab, and imaging results.     Radiology  CT Guided Abscess Drain Peritoneal    Result Date: 12/6/2021   DATE OF EXAM: 12/6/2021 9:20 AM  PROCEDURE: CT GUIDED ABSCESS DRAIN PERITONEAL-  INDICATIONS: left abdominal subfascial abscess; K65.1-Peritoneal abscess; M86.9-Osteomyelitis, unspecified; E87.6-Hypokalemia  COMPARISON: CT 12/04/2021  Description procedure: The patient's medications were reviewed and recorded. Informed consent was obtained following a detailed description of procedure including all risks. Specifically discussed were the chance of procedure-related bleeding, infection, perforation of adjacent organ, as well as potential failure of the procedure to completely resolve the patient's multifocal abscess, potentially necessitating additional surgical treatment or drain placement. The patient voiced understanding and wished to proceed.  The patient was placed supine on CT table. A brief noncontrasted scan was performed through the region of interest using a localization grid. The skin overlying the ovoid fluid collection within the left abdominal oblique musculature was marked and the area prepped and draped sterilely. Overall, the collection appears smaller than on the prior study of 12/04/2021, but appear large enough for drain placement. Lidocaine was used for local anesthesia. Access was obtained to the fluid collection using an 18-gauge coaxial guiding needle. A Minor wire was coiled in the collection, and the tract was dilated to accommodate a 8 Australian pigtail all-purpose drain catheter. After confirming appropriate position of the drain catheter within the collection, the drain was aspirated by syringe which yielded approximately 10 cc of grossly purulent material. The drain was flushed with sterile saline, secured in place at the abdominal wall skin site, and attached to KATIE bulb suction and continued to drain purulent material at the end of the procedure. The patient  tolerated the procedure well with no immediate post procedure complication. Sterile dressing was applied.  The procedure was performed under moderate conscious sedation with continuous monitoring using Versed and fentanyl. 15 minutes of physician monitored sedation were provided.      IMPRESSION : Placement of an additional 8 Persian pigtail drain catheter for drainage of abscess within the left lateral abdominal musculature, as described above.  Electronically Signed By-Shane Andrade MD On:12/6/2021 10:58 AM This report was finalized on [id number] by  Shane Andrade MD.      Cardiology    Laboratory    Results from last 7 days   Lab Units 12/06/21  0514 12/05/21  0347 12/04/21  0422   WBC 10*3/mm3 7.50 7.60 8.60   HEMOGLOBIN g/dL 9.0* 8.6* 9.7*   HEMATOCRIT % 28.4* 26.7* 29.7*   PLATELETS 10*3/mm3 258 262 312     Results from last 7 days   Lab Units 12/06/21  0514 12/05/21  2208 12/05/21  1116 12/05/21  0347 12/04/21  1335 12/04/21  0422   SODIUM mmol/L 141  --   --  140 141 138   POTASSIUM mmol/L 2.9* 3.2* 3.1* 2.1* 2.5* 2.3*   CHLORIDE mmol/L 102  --   --  98 99 94*   CO2 mmol/L 30.0*  --   --  32.0* 31.0* 31.0*   BUN mg/dL 3*  --   --  5* 5* 5*   CREATININE mg/dL 0.35*  --   --  0.46* 0.38* 0.43*   GLUCOSE mg/dL 95  --   --  100* 140* 154*   ALBUMIN g/dL 2.30*  --   --   --   --  2.70*   BILIRUBIN mg/dL 0.2  --   --   --   --  0.6   ALK PHOS U/L 252*  --   --   --   --  353*   AST (SGOT) U/L 10  --   --   --   --  7   ALT (SGPT) U/L <5  --   --   --   --  8   LIPASE U/L  --   --   --   --   --  13   CALCIUM mg/dL 7.9*  --   --  7.8* 7.9* 8.6                 Microbiology   Microbiology Results (last 10 days)     Procedure Component Value - Date/Time    Blood Culture - Blood, Arm, Left [[id number]]  (Normal) Collected: 12/04/21 0931    Lab Status: Preliminary result Specimen: Blood from Arm, Left Updated: 12/06/21 1000     Blood Culture No growth at 2 days    Blood Culture - Blood, Arm, Right [[id number]]  (Normal)  Collected: 12/04/21 0928    Lab Status: Preliminary result Specimen: Blood from Arm, Right Updated: 12/06/21 1115     Blood Culture No growth at 2 days    COVID PRE-OP / PRE-PROCEDURE SCREENING ORDER (NO ISOLATION) - Swab, Nasopharynx [[id number]]  (Normal) Collected: 12/04/21 0719    Lab Status: Final result Specimen: Swab from Nasopharynx Updated: 12/04/21 0814    Narrative:      The following orders were created for panel order COVID PRE-OP / PRE-PROCEDURE SCREENING ORDER (NO ISOLATION) - Swab, Nasopharynx.  Procedure                               Abnormality         Status                     ---------                               -----------         ------                     COVID-19,CEPHEID/ISELA/BD...[[id number]]  Normal              Final result                 Please view results for these tests on the individual orders.    COVID-19,CEPHEID/ISELA/BDMAX,COR/TUNDE/PAD/KIMO IN-HOUSE(OR EMERGENT/ADD-ON),NP SWAB IN TRANSPORT MEDIA 3-4 HR TAT, RT-PCR - Swab, Nasopharynx [[id number]]  (Normal) Collected: 12/04/21 0719    Lab Status: Final result Specimen: Swab from Nasopharynx Updated: 12/04/21 0814     COVID19 Not Detected    Narrative:      Fact sheet for providers: https://www.fda.gov/media/288367/download     Fact sheet for patients: https://www.fda.gov/media/738882/download  Fact sheet for providers: https://www.fda.gov/media/606666/download     Fact sheet for patients: https://www.fda.gov/media/164704/download    Urine Culture - Urine, Urine, Clean Catch [[id number]]  (Abnormal) Collected: 12/04/21 0548    Lab Status: Final result Specimen: Urine, Clean Catch Updated: 12/05/21 1323     Urine Culture Yeast isolated     Comment: No further workup.           <25,000 CFU/mL Mixed Juwan Isolated    Narrative:      Specimen contains mixed organisms of questionable pathogenicity which indicates contamination with commensal juwan.  Further identification is unlikely to provide clinically useful information.  Suggest  recollection.    Body Fluid Culture - Body Fluid, Peritoneum [[id number]] Collected: 12/03/21 1214    Lab Status: Preliminary result Specimen: Body Fluid from Peritoneum Updated: 12/06/21 1308     Body Fluid Culture No growth at 3 days     Gram Stain Moderate (3+) WBCs per low power field      No organisms seen          Medication Review:       Schedule Meds  aluminum sulfate-calcium acetate 1:20, 1,000 mL, Topical, Q8H  cefTRIAXone, 2 g, Intravenous, Q24H  folic acid, 1 mg, Oral, Daily  insulin lispro, 0-7 Units, Subcutaneous, Q6H  metroNIDAZOLE, 500 mg, Oral, Q8H  sodium chloride, 10 mL, Intravenous, Q12H        Infusion Meds  sodium chloride 0.9 % with KCl 20 mEq, 50 mL/hr, Last Rate: 50 mL/hr (12/06/21 1133)        PRN Meds  •  acetaminophen **OR** acetaminophen **OR** acetaminophen  •  aluminum-magnesium hydroxide-simethicone  •  Calcium Gluconate-NaCl **AND** calcium gluconate **AND** Calcium, Ionized  •  dextrose  •  dextrose  •  glucagon (human recombinant)  •  HYDROcodone-acetaminophen  •  HYDROcodone-acetaminophen  •  insulin lispro **AND** insulin lispro  •  magnesium sulfate **OR** magnesium sulfate **OR** magnesium sulfate  •  melatonin  •  ondansetron **OR** ondansetron  •  potassium & sodium phosphates **OR** potassium & sodium phosphates  •  potassium chloride **OR** potassium chloride **OR** potassium chloride  •  [COMPLETED] Insert peripheral IV **AND** sodium chloride  •  sodium chloride        Assessment/Plan       Antimicrobial Therapy   1. IV ceftriaxone        2. P.o. metronidazole        3.        4.        5.                Assessment     Recurrent abdominal abscesses.  The current CT the abdomen is shows the original normal abscess was significantly decreased but there are no new fluid collections along the  left lower quadrant and along the left peritoneal wall that do not communicate with the drain.  There is some concern for possible retrocutaneous fistula.  There is a new fluid  collection posterior to the greater trochanter could be bursitis versus abscess.    -Patient has continued lower abdominal pain  S/p IR placement of new drain tube on December 6, 2021     Concern for possible new finding of osteomyelitis versus metastatic disease  CT also showed some changes in the left iliac vein and  left ischium concerning for possible osteomyelitis.    -Patient complains about recent pain to her left hip and left leg with some subsequent swelling     Recent history of large left lower quadrant abscess.    Abscess was drained on 11/18/2021 and 400 mL of purulent drainage was removed.  Cultures are showing heavy growth of group B streptococcus.  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  However patient does have some chronic perirectal wounds  -Patient completed 2 weeks of IV Rocephin and p.o. 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   Fairly significant denuded areas with some drainage.  -Wound cultures from 11/20/2021 also grew group B streptococcus     Type 2 diabetes-uncontrolled patient's A1c is 10     History of vulvar cancer with a vulvectomy and radiation and chemotherapy treatments approximately 2 years ago.  Patient has had no recent treatments and-she reports that the cancer has returned and she cannot tolerate chemotherapy  -Patient was hospice but is currently not with that service and since she is getting treatment for infections  -Patient still has a port        Plan     Continue IV Rocephin 2 g every 24 for 4 weeks  Continue p.o. Flagyl 500 mg 3 times daily for 4 weeks  Intervention radiology is supposed to place drain tube tomorrow  Continue supportive care  A.m. labs  Long-term prognosis is guarded  Send fluid culture from the new drain tube site    Faisal Clark MD  12/06/21  16:49 EST    Note is dictated utilizing voice recognition software/Dragon

## 2021-12-06 NOTE — PROGRESS NOTES
[facility] Daily Progress Note    Patient Name: Carmella Pelayo  : 1955  MRN: [id number]  Primary Care Physician:  Deana Wallace MD  Date of admission: 2021      Subjective      Chief Complaint: Abdominal discomfort      Hypokalemia has been noticed  She denies pain or vomiting or diarrhea    Repeat potassium better  Awaiting IR/CT-guided drainage  Orthopedic surgeon not planning on any intervention    2021: Patient seen and examined today.  Seen after the CT drain placement.  Doing well, pain well controlled.  No other complaints.    Denies fever, chills, chest pain, shortness of breath, nausea, vomiting, diarrhea, dysuria, or dizziness.    Objective      Vitals:   Temp:  [98.1 °F (36.7 °C)-99.7 °F (37.6 °C)] 98.1 °F (36.7 °C)  Heart Rate:  [66-96] 82  Resp:  [10-17] 17  BP: (113-153)/(57-82) 124/79    Physical Exam   General: Awake, alert, elderly female, lying in bed, NAD  Eyes: PERRL, EOMI, conjunctive are clear  Cardiovascular: Regular rate and rhythm, no murmurs  Respiratory: Clear to auscultation bilaterally, no wheezing or rales, unlabored breathing  Abdomen: Soft, nontender, left quadrant drain same place, positive bowel sounds, no guarding  Neurologic: A&O, CN grossly intact, moves all extremities spontaneously  Musculoskeletal: Normal range of motion, no deformities  Skin: Warm, dry, intact           Result Review    Result Review:  I have personally reviewed the results from the time of this admission to 2021 13:20 EST and agree with these findings:  [x]  Laboratory  [x]  Microbiology  [x]  Radiology  [x]  EKG/Telemetry   [x]  Cardiology/Vascular   []  Pathology  [x]  Old records  []  Other:  Most notable findings include: As outlined above  Wounds (last 24 hours)     LDA Wound     Row Name 21 1135 21 0720 21 2100       Wound 21 1845 Bilateral coccyx    Wound - Properties Group Placement Date: 21   -SS Placement Time: 1845 -SS Present on Hospital Admission: Y  -SS Side: Bilateral  -SS Location: coccyx  -SS Stage, Pressure Injury : other (see comments)  -SS, MASD     Dressing Appearance -- open to air  -JM --    Closure None  -NG None  -JM --    Base pink; red  -NG pink; red  -JM --    Edges irregular  -NG irregular  -JM --    Drainage Amount none  -NG none  -JM --    Care, Wound -- cleansed with; soap and water  -JM cleansed with; soap and water  -MG    Dressing Care -- skin barrier agent applied  -JM skin barrier agent applied  -MG    Periwound Care -- barrier film applied  -JM --    Retired Wound - Properties Group Date first assessed: 12/04/21 -SS Time first assessed: 1845 -SS Present on Hospital Admission: Y  -SS Side: Bilateral  -SS Location: coccyx  -SS    Row Name 12/05/21 1950 12/05/21 1500          Wound 12/04/21 1845 Bilateral coccyx    Wound - Properties Group Placement Date: 12/04/21 -SS Placement Time: 1845 -SS Present on Hospital Admission: Y  -SS Side: Bilateral  -SS Location: coccyx  -SS Stage, Pressure Injury : other (see comments)  -SS, MASD      Dressing Appearance open to air  -MG --     Closure -- None  -JMA     Base pink; red  -MG pink; red; other (see comments)  excoriated  -JMA     Edges irregular  -MG irregular  -JMA     Drainage Amount none  -MG none  -JMA     Care, Wound cleansed with; soap and water  -MG --     Dressing Care skin barrier agent applied  -MG --     Periwound Care barrier film applied  -MG --     Retired Wound - Properties Group Date first assessed: 12/04/21 -SS Time first assessed: 1845 -SS Present on Hospital Admission: Y  -SS Side: Bilateral  -SS Location: coccyx  -SS           User Key  (r) = Recorded By, (t) = Taken By, (c) = Cosigned By    Initials Name Provider Type    Elyssa Briceno, RN Registered Nurse    Lindsey Edwards LPN Licensed Nurse    Damián Hill RN Registered Nurse    Jimena Shelley LPN Licensed Nurse      Carly Irby, RN Registered Nurse                  Assessment/Plan      Brief Patient Summary:  Carmella Pelayo is a 66 y.o. female   Carmella Pelayo is a 66 y.o. female with left lower quadrant abdominal abscess s/p percutaneous drain placement 11/18/2021. Cultures grew group B streptococcus. She was discharged to rehab on 11/21/2021 on 2 weeks of IV rocephin and 2 weeks of oral flagyl. She saw Dr. Carpenter yesterday and was told that her antibiotics were going to change as yesterday was supposed to be her last day of IV rocephin per her report. She did not have any antibiotics 12/3/2021. She was discharged from rehab to home yesterday 12/3/2021. She returns to the ED 12/4/2021 with complaints of lower abdominal pain and left hip pain. She stated her left lower abdominal pain returned a few days ago. She feels pressure like a seat belt is wrapped around her left lower abdomen. Her output from her percutaneous drain has not increased or changed in color. She has left hip, groin, and leg pain. Her left leg is swollen. The pain is a severe ache that worsens with standing. She can barely put pressure on her leg and is unable to walk. She denied any fever, nausea, vomiting, or diarrhea. She has had decreased oral intake because nothing tastes good since being on antibiotics. She did have some hematuria this morning. She called EMS to take her to the ED.   In the ED patient had CT abdomen that showed evolving left peritoneal/retroperitoneal abscess and soft tissue phlegmon. Surgical drain is present. Larger components of this abscess collection appear significantly decreased compared to prior. Small irregular lobulations in the left lower quadrant and along the left peritoneal wall persist and are not included communication with the drain. Redemonstrated soft tissue defect and skin thickening in the perineum compatible with known vulvar cancer. There is enhancing heterogeneous fluid tracking extending from the  anterior wall of the lower rectum to the perineum which could reflect a rectocutaneous fistula. New enhancing crescentic fluid collection posterior to the greater trochanter which could reflect bursitis versus abscess. Permeative changes in the left iliac wing and left ischium concerning for active bony destruction. Given adjacent soft tissue infection, findings are concerning for osteomyelitis in this location. WBC normal, afebrile, tachycardic low 100s, not hypotensive. CMP showed potassium 2.3. She was started on IV cefepime and vancomycin. She was admitted for further treatment of left hip abscess, possible osteomyelitis of left iliac wing and ischium. ID will be consulted.   She has recurrent vulvar cancer s/p vulvectomy, radiation, and chemotherapy no longer a hospice patient but not receiving any current treatment for her cancer. She has chronic perirectal wounds from previous vulvectomy surgery 2019.         cefTRIAXone, 2 g, Intravenous, Q24H  folic acid, 1 mg, Oral, Daily  insulin lispro, 0-7 Units, Subcutaneous, Q6H  metroNIDAZOLE, 500 mg, Oral, Q8H  sodium chloride, 10 mL, Intravenous, Q12H       sodium chloride 0.9 % with KCl 20 mEq, 50 mL/hr, Last Rate: 50 mL/hr (12/06/21 1133)         Active Hospital Problems:  Active Hospital Problems    Diagnosis    • **Abscess of left hip    • Intra-abdominal abscess (HCC)    • Osteomyelitis of left side of pelvis (HCC)    • Left hip pain    • Hypokalemia    • Hypomagnesemia    • Malignant neoplasm of vulva (HCC)      Squamous cell carcinoma     • Left lower quadrant abdominal pain    • Anemia    • Type 2 diabetes mellitus (HCC)      Patient no longer taking metformin d/t insurance issues.  Patient no longer taking metformin d/t insurance issues.       Plan:     LLQ abdominal abscesses  -CT findings noted  -s/p IR Drain placement 11/18/2021 and on 12/5  -fluid culture collected from perc drain 12/3/2021 at Dr. Carpenter's office  -pt was discharged on 11/21/2021 to  rehab to complete 2 weeks of IV rocephin and oral flagyl after her fluid culture grew group B streptococcus. She reported she did not have her last day of IV rocephin 12/3 but did complete the oral flagyl.   -Continue IV Rocephin and Flagyl per ID for total of 4 more weeks.  -ID and Gen. Sx following    Left hip pain, possible OM vs metastatic disease  -Noted on CT  -Ortho signed off, recommending antibiotics and IR drainage  -Management as above    Recurrent vulvar cancer  -s/p hemivulvectomy and radiation with chemo 2 years ago  -CT findings noted  -Patient was previously on hospice but discharged and is not on any treatment currently  -Oncology consulted    Chronic perirectal wounds from previous vulvectomy surgery in 2019 along with moisture associated dermatitis.  Wound cultures from 11/20/21 grew group B streptococcus.    Hypokalemia  Hypomagnesemia   -Replace and monitor  -Nephrology following    DM2  -Monitor blood glucose, continue sliding scale  -Adjust as needed     DVT prophylaxis  -SCDs    CODE STATUS:    Level Of Support Discussed With: Patient  Code Status (Patient has no pulse and is not breathing): CPR (Attempt to Resuscitate)  Medical Interventions (Patient has pulse or is breathing): Full Support      Disposition:  I expect patient to be discharged home health once cleared by general surgery, oncology, and ID    This patient has been examined wearing appropriate Personal Protective Equipment on 12/06/21      Electronically signed by Rakel Stoddard DO, 12/06/21, 13:20 EST.  [facility] Hospitalist Team

## 2021-12-06 NOTE — NURSING NOTE
Pt assisted by IR staff in moving back into her bed, laying semi-fowlers. Pt denies any pain at this time.

## 2021-12-06 NOTE — PROGRESS NOTES
General Surgery Progress Note    Name: Carmella Pelayo ADMIT: 2021   : 1955  PCP: Deana Wallace MD    MRN: [id number] LOS: 2 days   AGE/SEX: 66 y.o. female  ROOM: [facility]/[address] [place] [address]    Chief Complaint   Patient presents with   • Abdominal Pain     Subjective     66 y.o. female with a left lower quadrant abscess with status post percutaneous drainage unknown etiology for the development of this collection    Overnight no major issues no fever chills new shortness of breath or chest pain.  Pain not really controlled with plain tylenol    She did just have 2nd drain placed with serosanguinous output    Objective     Scheduled Medications:   cefTRIAXone, 2 g, Intravenous, Q24H  folic acid, 1 mg, Oral, Daily  insulin lispro, 0-7 Units, Subcutaneous, Q6H  metroNIDAZOLE, 500 mg, Oral, Q8H  sodium chloride, 10 mL, Intravenous, Q12H        Active Infusions:  sodium chloride 0.9 % with KCl 20 mEq, 50 mL/hr, Last Rate: 50 mL/hr (21 1133)        As Needed Medications:  •  acetaminophen **OR** acetaminophen **OR** acetaminophen  •  aluminum-magnesium hydroxide-simethicone  •  Calcium Gluconate-NaCl **AND** calcium gluconate **AND** Calcium, Ionized  •  dextrose  •  dextrose  •  glucagon (human recombinant)  •  HYDROcodone-acetaminophen  •  HYDROcodone-acetaminophen  •  insulin lispro **AND** insulin lispro  •  magnesium sulfate **OR** magnesium sulfate **OR** magnesium sulfate  •  melatonin  •  ondansetron **OR** ondansetron  •  potassium & sodium phosphates **OR** potassium & sodium phosphates  •  potassium chloride **OR** potassium chloride **OR** potassium chloride  •  [COMPLETED] Insert peripheral IV **AND** sodium chloride  •  sodium chloride    Vital Signs  Vital Signs (range)  Temp:  [98.1 °F (36.7 °C)-99.7 °F (37.6 °C)] 98.1 °F (36.7 °C)  Heart Rate:  [66-96] 82  Resp:  [10-17] 17  BP: (113-153)/(57-82) 124/79    Physical Exam:  Physical Exam  Constitutional:       Appearance: Normal  appearance.   HENT:      Head: Normocephalic and atraumatic.   Pulmonary:      Effort: Pulmonary effort is normal. No respiratory distress.   Abdominal:      General: Bowel sounds are normal. There is no distension.      Palpations: Abdomen is soft.   Genitourinary:      Skin:     General: Skin is warm and dry.   Neurological:      General: No focal deficit present.      Mental Status: She is alert. Mental status is at baseline.   Psychiatric:         Mood and Affect: Mood normal.         Behavior: Behavior normal.     Patient has a left groin crease wound that is tender and deep    Results Review:     CBC    Results from last 7 days   Lab Units 12/06/21  0514 12/05/21  0347 12/04/21  0422   WBC 10*3/mm3 7.50 7.60 8.60   HEMOGLOBIN g/dL 9.0* 8.6* 9.7*   PLATELETS 10*3/mm3 258 262 312     BMP   Results from last 7 days   Lab Units 12/06/21  0514 12/05/21  2208 12/05/21  1116 12/05/21  0347 12/04/21  1335 12/04/21  0422   SODIUM mmol/L 141  --   --  140 141 138   POTASSIUM mmol/L 2.9* 3.2* 3.1* 2.1* 2.5* 2.3*   CHLORIDE mmol/L 102  --   --  98 99 94*   CO2 mmol/L 30.0*  --   --  32.0* 31.0* 31.0*   BUN mg/dL 3*  --   --  5* 5* 5*   CREATININE mg/dL 0.35*  --   --  0.46* 0.38* 0.43*   GLUCOSE mg/dL 95  --   --  100* 140* 154*   MAGNESIUM mg/dL  --   --   --  2.2  --  1.3*     Radiology(recent) CT Guided Abscess Drain Peritoneal    Result Date: 12/6/2021  IMPRESSION : Placement of an additional 8 Romansh pigtail drain catheter for drainage of abscess within the left lateral abdominal musculature, as described above.  Electronically Signed By-Shane Andrade MD On:12/6/2021 10:58 AM This report was finalized on [id number] by  Shane Andrade MD.      I reviewed the patient's new clinical results.    Assessment/Plan       Abscess of left hip    Malignant neoplasm of vulva (HCC)    Type 2 diabetes mellitus (HCC)    Left lower quadrant abdominal pain    Anemia    Intra-abdominal abscess (HCC)    Osteomyelitis of left side of  pelvis (HCC)    Left hip pain    Hypokalemia    Hypomagnesemia      66 y.o. female with left lower quadrant abscess, possible mets vs osteo or both of the left hip        Continue to treat this with antibiotics it may make sense to place another drain in the superior fluid collection that does not seem to be communicating with the PERC drain.    consult IR has been placed for PERC drainage of this and the fluid is serosanguanous, the lower drain has been purulent, and was negative for chylomicrons on 11/19    Await culture. No surgical intervention planned.     Await input from wound care regarding left groin crease wound        Jordana Carpenter MD  12/06/21  13:30 EST

## 2021-12-07 LAB
ALBUMIN SERPL-MCNC: 2.3 G/DL (ref 3.5–5.2)
ALBUMIN/GLOB SERPL: 0.5 G/DL
ALP SERPL-CCNC: 225 U/L (ref 39–117)
ALT SERPL W P-5'-P-CCNC: 5 U/L (ref 1–33)
ANION GAP SERPL CALCULATED.3IONS-SCNC: 6 MMOL/L (ref 5–15)
AST SERPL-CCNC: 9 U/L (ref 1–32)
BASOPHILS # BLD AUTO: 0.1 10*3/MM3 (ref 0–0.2)
BASOPHILS NFR BLD AUTO: 0.9 % (ref 0–1.5)
BILIRUB SERPL-MCNC: 0.3 MG/DL (ref 0–1.2)
BUN SERPL-MCNC: 3 MG/DL (ref 8–23)
BUN/CREAT SERPL: 7.7 (ref 7–25)
CALCIUM SPEC-SCNC: 8.1 MG/DL (ref 8.6–10.5)
CHLORIDE SERPL-SCNC: 103 MMOL/L (ref 98–107)
CO2 SERPL-SCNC: 29 MMOL/L (ref 22–29)
CREAT SERPL-MCNC: 0.39 MG/DL (ref 0.57–1)
DEPRECATED RDW RBC AUTO: 58.2 FL (ref 37–54)
EOSINOPHIL # BLD AUTO: 0.6 10*3/MM3 (ref 0–0.4)
EOSINOPHIL NFR BLD AUTO: 8.7 % (ref 0.3–6.2)
ERYTHROCYTE [DISTWIDTH] IN BLOOD BY AUTOMATED COUNT: 21.6 % (ref 12.3–15.4)
GFR SERPL CREATININE-BSD FRML MDRD: >150 ML/MIN/1.73
GLOBULIN UR ELPH-MCNC: 4.2 GM/DL
GLUCOSE BLDC GLUCOMTR-MCNC: 111 MG/DL (ref 70–105)
GLUCOSE BLDC GLUCOMTR-MCNC: 121 MG/DL (ref 70–105)
GLUCOSE BLDC GLUCOMTR-MCNC: 152 MG/DL (ref 70–105)
GLUCOSE SERPL-MCNC: 107 MG/DL (ref 65–99)
HCT VFR BLD AUTO: 28 % (ref 34–46.6)
HGB BLD-MCNC: 9 G/DL (ref 12–15.9)
LYMPHOCYTES # BLD AUTO: 1.3 10*3/MM3 (ref 0.7–3.1)
LYMPHOCYTES NFR BLD AUTO: 19 % (ref 19.6–45.3)
MAGNESIUM SERPL-MCNC: 1.6 MG/DL (ref 1.6–2.4)
MCH RBC QN AUTO: 25.1 PG (ref 26.6–33)
MCHC RBC AUTO-ENTMCNC: 32.2 G/DL (ref 31.5–35.7)
MCV RBC AUTO: 78 FL (ref 79–97)
MONOCYTES # BLD AUTO: 0.7 10*3/MM3 (ref 0.1–0.9)
MONOCYTES NFR BLD AUTO: 10.8 % (ref 5–12)
NEUTROPHILS NFR BLD AUTO: 4.1 10*3/MM3 (ref 1.7–7)
NEUTROPHILS NFR BLD AUTO: 60.6 % (ref 42.7–76)
NRBC BLD AUTO-RTO: 0 /100 WBC (ref 0–0.2)
PLATELET # BLD AUTO: 258 10*3/MM3 (ref 140–450)
PMV BLD AUTO: 7.3 FL (ref 6–12)
POTASSIUM SERPL-SCNC: 4.3 MMOL/L (ref 3.5–5.2)
PROT SERPL-MCNC: 6.5 G/DL (ref 6–8.5)
QT INTERVAL: 414 MS
RBC # BLD AUTO: 3.59 10*6/MM3 (ref 3.77–5.28)
SODIUM SERPL-SCNC: 138 MMOL/L (ref 136–145)
WBC NRBC COR # BLD: 6.8 10*3/MM3 (ref 3.4–10.8)

## 2021-12-07 PROCEDURE — 97530 THERAPEUTIC ACTIVITIES: CPT

## 2021-12-07 PROCEDURE — 99232 SBSQ HOSP IP/OBS MODERATE 35: CPT | Performed by: INTERNAL MEDICINE

## 2021-12-07 PROCEDURE — 25010000002 CEFTRIAXONE PER 250 MG: Performed by: INTERNAL MEDICINE

## 2021-12-07 PROCEDURE — 87070 CULTURE OTHR SPECIMN AEROBIC: CPT | Performed by: NURSE PRACTITIONER

## 2021-12-07 PROCEDURE — 85025 COMPLETE CBC W/AUTO DIFF WBC: CPT | Performed by: INTERNAL MEDICINE

## 2021-12-07 PROCEDURE — 80053 COMPREHEN METABOLIC PANEL: CPT | Performed by: INTERNAL MEDICINE

## 2021-12-07 PROCEDURE — 82962 GLUCOSE BLOOD TEST: CPT

## 2021-12-07 PROCEDURE — 25010000002 SODIUM CHLORIDE 0.9 % WITH KCL 20 MEQ 20-0.9 MEQ/L-% SOLUTION: Performed by: INTERNAL MEDICINE

## 2021-12-07 PROCEDURE — 87205 SMEAR GRAM STAIN: CPT | Performed by: NURSE PRACTITIONER

## 2021-12-07 PROCEDURE — 83735 ASSAY OF MAGNESIUM: CPT | Performed by: INTERNAL MEDICINE

## 2021-12-07 PROCEDURE — 25010000002 ENOXAPARIN PER 10 MG: Performed by: INTERNAL MEDICINE

## 2021-12-07 PROCEDURE — 63710000001 ONDANSETRON PER 8 MG: Performed by: NURSE PRACTITIONER

## 2021-12-07 RX ORDER — POTASSIUM CHLORIDE 20 MEQ/1
20 TABLET, EXTENDED RELEASE ORAL 2 TIMES DAILY WITH MEALS
Status: DISCONTINUED | OUTPATIENT
Start: 2021-12-07 | End: 2021-12-08

## 2021-12-07 RX ORDER — FLUCONAZOLE 100 MG/1
100 TABLET ORAL EVERY 24 HOURS
Status: DISCONTINUED | OUTPATIENT
Start: 2021-12-07 | End: 2021-12-08 | Stop reason: HOSPADM

## 2021-12-07 RX ORDER — INSULIN LISPRO 100 [IU]/ML
0-7 INJECTION, SOLUTION INTRAVENOUS; SUBCUTANEOUS AS NEEDED
Status: DISCONTINUED | OUTPATIENT
Start: 2021-12-07 | End: 2021-12-08 | Stop reason: HOSPADM

## 2021-12-07 RX ORDER — INSULIN LISPRO 100 [IU]/ML
0-7 INJECTION, SOLUTION INTRAVENOUS; SUBCUTANEOUS
Status: DISCONTINUED | OUTPATIENT
Start: 2021-12-08 | End: 2021-12-08 | Stop reason: HOSPADM

## 2021-12-07 RX ADMIN — SODIUM CHLORIDE, PRESERVATIVE FREE 10 ML: 5 INJECTION INTRAVENOUS at 08:34

## 2021-12-07 RX ADMIN — POTASSIUM CHLORIDE 40 MEQ: 1.5 POWDER, FOR SOLUTION ORAL at 03:26

## 2021-12-07 RX ADMIN — SODIUM CHLORIDE AND POTASSIUM CHLORIDE 50 ML/HR: .9; .15 SOLUTION INTRAVENOUS at 01:30

## 2021-12-07 RX ADMIN — HYDROCODONE BITARTRATE AND ACETAMINOPHEN 1 TABLET: 5; 325 TABLET ORAL at 08:34

## 2021-12-07 RX ADMIN — METRONIDAZOLE 500 MG: 500 TABLET ORAL at 06:40

## 2021-12-07 RX ADMIN — CALCIUM ACETATE MONOHYDRATE AND ALUMINUM SULFATE TETRADECAHYDRATE 1000 ML: 952; 1347 POWDER, FOR SOLUTION TOPICAL at 22:04

## 2021-12-07 RX ADMIN — HYDROCODONE BITARTRATE AND ACETAMINOPHEN 1 TABLET: 10; 325 TABLET ORAL at 19:40

## 2021-12-07 RX ADMIN — CALCIUM ACETATE MONOHYDRATE AND ALUMINUM SULFATE TETRADECAHYDRATE 1000 ML: 952; 1347 POWDER, FOR SOLUTION TOPICAL at 15:50

## 2021-12-07 RX ADMIN — METRONIDAZOLE 500 MG: 500 TABLET ORAL at 22:00

## 2021-12-07 RX ADMIN — FLUCONAZOLE 100 MG: 100 TABLET ORAL at 17:00

## 2021-12-07 RX ADMIN — SODIUM CHLORIDE, PRESERVATIVE FREE 10 ML: 5 INJECTION INTRAVENOUS at 22:00

## 2021-12-07 RX ADMIN — CEFTRIAXONE SODIUM 2 G: 2 INJECTION, POWDER, FOR SOLUTION INTRAMUSCULAR; INTRAVENOUS at 22:00

## 2021-12-07 RX ADMIN — HYDROCODONE BITARTRATE AND ACETAMINOPHEN 1 TABLET: 5; 325 TABLET ORAL at 23:30

## 2021-12-07 RX ADMIN — FOLIC ACID 1 MG: 1 TABLET ORAL at 08:33

## 2021-12-07 RX ADMIN — POTASSIUM CHLORIDE 20 MEQ: 1500 TABLET, EXTENDED RELEASE ORAL at 17:00

## 2021-12-07 RX ADMIN — CALCIUM ACETATE MONOHYDRATE AND ALUMINUM SULFATE TETRADECAHYDRATE 1000 ML: 952; 1347 POWDER, FOR SOLUTION TOPICAL at 06:39

## 2021-12-07 RX ADMIN — METRONIDAZOLE 500 MG: 500 TABLET ORAL at 17:00

## 2021-12-07 RX ADMIN — ONDANSETRON HYDROCHLORIDE 4 MG: 4 TABLET, FILM COATED ORAL at 15:49

## 2021-12-07 NOTE — CASE MANAGEMENT/SOCIAL WORK
Continued Stay Note   [facility]     Patient Name: Carmella Pelayo  MRN: [id number]  Today's Date: 12/7/2021    Admit Date: 12/4/2021     Discharge Plan     Row Name 12/07/21 1211       Plan    Plan Referral to [facility], pending acceptance. No PASRR or precert required.    Plan Comments Received notice from [facility] they are OON. Referral to [facility] for IV abx and they are in network and patient is approved. Upon chart review patient is also receiving dressing changes 3x/day. Met with patient and bedside to discuss IP rehab, she is agreeable. 1st [facility], referral made and pending acceptance. Informed [facility] liaison and [facility] liaison of change in plan.              Met with patient in room wearing PPE: mask     Maintained distance greater than six feet and spent less than 15 minutes in the room.          Jaleesa Leone RN

## 2021-12-07 NOTE — PROGRESS NOTES
"[facility] PROGRESS NOTE     LOS: 3 days    Patient Care Team:  Deana Wallace MD as PCP - General (Family Medicine)      Subjective     Patient resting comfortably  Appetite improving.  Denies any nausea or vomiting    Objective     Vital Sign Min/Max for last 24 hours  Temp:  [98.1 °F (36.7 °C)-98.7 °F (37.1 °C)] 98.1 °F (36.7 °C)  Heart Rate:  [80-85] 80  Resp:  [16-17] 16  BP: (117-124)/(71-79) 119/71                       Flowsheet Rows      First Filed Value   Admission Height 154.9 cm (61\") Documented at 12/04/2021 0336   Admission Weight 55 kg (121 lb 4.1 oz) Documented at 12/04/2021 0336          No intake/output data recorded.  I/O last 3 completed shifts:  In: 600 [P.O.:600]  Out: 60 [Drains:60]    Physical Exam:  Physical Exam    General Appearance: alert, oriented x 3, no acute distress   Skin: warm and dry  HEENT: oral mucosa normal, nonicteric sclera  Neck: supple, no JVD  Lungs: CTA  Heart: RRR, normal S1 and S2  Abdomen: soft, nontender, nondistended.  Drain+ LLQ  : no palpable bladder  Extremities: no edema, cyanosis or clubbing  Neuro: normal speech and mental status        LABS:  Lab Results   Component Value Date    CALCIUM 8.1 (L) 12/07/2021     Results from last 7 days   Lab Units 12/07/21  0421 12/07/21  0420 12/06/21  1934 12/06/21  0514 12/06/21  0514 12/05/21  1116 12/05/21  0347 12/04/21  1335 12/04/21  0422   MAGNESIUM mg/dL  --  1.6  --   --   --   --  2.2  --  1.3*   SODIUM mmol/L  --  138  --   --  141  --  140   < > 138   POTASSIUM mmol/L  --  4.3 3.0*  --  2.9*   < > 2.1*   < > 2.3*   CHLORIDE mmol/L  --  103  --   --  102  --  98   < > 94*   CO2 mmol/L  --  29.0  --   --  30.0*  --  32.0*   < > 31.0*   BUN mg/dL  --  3*  --   --  3*  --  5*   < > 5*   CREATININE mg/dL  --  0.39*  --   --  0.35*  --  0.46*   < > 0.43*   GLUCOSE mg/dL  --  107*  --    < > 95  --  100*   < > 154*   CALCIUM mg/dL  --  8.1*  --   --  7.9*  --  7.8*   < > 8.6   WBC 10*3/mm3 6.80  --   --   -- "  7.50  --  7.60  --  8.60   HEMOGLOBIN g/dL 9.0*  --   --   --  9.0*  --  8.6*  --  9.7*   PLATELETS 10*3/mm3 258  --   --   --  258  --  262  --  312   ALT (SGPT) U/L  --  5  --   --  <5  --   --   --  8   AST (SGOT) U/L  --  9  --   --  10  --   --   --  7    < > = values in this interval not displayed.     No results found for: CKTOTAL, CKMB, CKMBINDEX, TROPONINI, TROPONINT  Estimated Creatinine Clearance: 57.3 mL/min (A) (by C-G formula based on SCr of 0.39 mg/dL (L)).      Brief Urine Lab Results  (Last result in the past 365 days)      Color   Clarity   Blood   Leuk Est   Nitrite   Protein   CREAT   Urine HCG        12/04/21 0548 Yellow   Clear   Small (1+)   Small (1+)   Negative   Negative               WEIGHTS:     Wt Readings from Last 1 Encounters:   12/07/21 0518 59.6 kg (131 lb 5.3 oz)   12/06/21 0525 59.2 kg (130 lb 8.2 oz)   12/05/21 0400 57.1 kg (125 lb 14.4 oz)   12/04/21 0336 55 kg (121 lb 4.1 oz)       aluminum sulfate-calcium acetate 1:20, 1,000 mL, Topical, Q8H  cefTRIAXone, 2 g, Intravenous, Q24H  folic acid, 1 mg, Oral, Daily  insulin lispro, 0-7 Units, Subcutaneous, Q6H  metroNIDAZOLE, 500 mg, Oral, Q8H  sodium chloride, 10 mL, Intravenous, Q12H      sodium chloride 0.9 % with KCl 20 mEq, 50 mL/hr, Last Rate: 50 mL/hr (12/07/21 0130)        Assessment/Plan       1.  Hypokalemia  Higher transtubular potassium gradient in the presence of hypokalemia suggestive of potassium losing nephropathy  Potassium level has improved.  Continue oral potassium replacement  May consider low-dose amiloride in future    2.  Intra-abdominal abscess  S/p drain placement by IR  On IV antibiotics per infectious disease following      Odin Cazares MD  12/07/21  10:21 EST

## 2021-12-07 NOTE — PLAN OF CARE
Goal Outcome Evaluation:  Plan of Care Reviewed With: patient        Progress: improving  Outcome Summary: Pt encouraged to use bathroom today but stated she cannot, she uses the bedpan to void, prn pain med given for pain, wound care perfromed on groin area.

## 2021-12-07 NOTE — PLAN OF CARE
Problem: Fall Injury Risk  Goal: Absence of Fall and Fall-Related Injury  Outcome: Ongoing, Progressing  Intervention: Identify and Manage Contributors to Fall Injury Risk  Recent Flowsheet Documentation  Taken 12/7/2021 1115 by Carly Irby RN  Medication Review/Management: medications reviewed  Taken 12/7/2021 0926 by Carly Irby RN  Medication Review/Management: medications reviewed  Taken 12/7/2021 0730 by Carly Irby RN  Medication Review/Management: medications reviewed  Intervention: Promote Injury-Free Environment  Recent Flowsheet Documentation  Taken 12/7/2021 1115 by Carly Irby RN  Safety Promotion/Fall Prevention:   safety round/check completed   nonskid shoes/slippers when out of bed   fall prevention program maintained  Taken 12/7/2021 0926 by Carly Irby RN  Safety Promotion/Fall Prevention:   safety round/check completed   nonskid shoes/slippers when out of bed   fall prevention program maintained  Taken 12/7/2021 0730 by Carly Irby RN  Safety Promotion/Fall Prevention:   safety round/check completed   nonskid shoes/slippers when out of bed   fall prevention program maintained     Problem: Skin Injury Risk Increased  Goal: Skin Health and Integrity  Outcome: Ongoing, Progressing  Intervention: Optimize Skin Protection  Recent Flowsheet Documentation  Taken 12/7/2021 0730 by Carly Irby RN  Pressure Reduction Techniques: frequent weight shift encouraged  Head of Bed (HOB): HOB elevated  Pressure Reduction Devices: pressure-redistributing mattress utilized  Skin Protection: tubing/devices free from skin contact     Problem: Adult Inpatient Plan of Care  Goal: Plan of Care Review  Outcome: Ongoing, Progressing  Flowsheets (Taken 12/7/2021 1254)  Plan of Care Reviewed With: patient  Goal: Patient-Specific Goal (Individualized)  Outcome: Ongoing, Progressing  Goal: Absence of Hospital-Acquired Illness or Injury  Outcome: Ongoing, Progressing  Intervention:  Identify and Manage Fall Risk  Recent Flowsheet Documentation  Taken 12/7/2021 1115 by Carly Irby RN  Safety Promotion/Fall Prevention:   safety round/check completed   nonskid shoes/slippers when out of bed   fall prevention program maintained  Taken 12/7/2021 0926 by Carly Irby RN  Safety Promotion/Fall Prevention:   safety round/check completed   nonskid shoes/slippers when out of bed   fall prevention program maintained  Taken 12/7/2021 0730 by Carly Irby RN  Safety Promotion/Fall Prevention:   safety round/check completed   nonskid shoes/slippers when out of bed   fall prevention program maintained  Intervention: Prevent Skin Injury  Recent Flowsheet Documentation  Taken 12/7/2021 0730 by Carly Irby RN  Body Position: position maintained  Skin Protection: tubing/devices free from skin contact  Intervention: Prevent Infection  Recent Flowsheet Documentation  Taken 12/7/2021 1115 by Carly Irby RN  Infection Prevention:   single patient room provided   rest/sleep promoted   hand hygiene promoted  Taken 12/7/2021 0926 by Carly Irby RN  Infection Prevention:   single patient room provided   rest/sleep promoted   hand hygiene promoted  Taken 12/7/2021 0730 by Carly Irby RN  Infection Prevention:   single patient room provided   rest/sleep promoted   hand hygiene promoted  Goal: Optimal Comfort and Wellbeing  Outcome: Ongoing, Progressing  Intervention: Provide Person-Centered Care  Recent Flowsheet Documentation  Taken 12/7/2021 0730 by Carly Irby RN  Trust Relationship/Rapport:   care explained   thoughts/feelings acknowledged  Goal: Readiness for Transition of Care  Outcome: Ongoing, Progressing   Goal Outcome Evaluation:  Plan of Care Reviewed With: patient      Patient rested throughout the shift. Wound was cleaned per order. Fluids were d/c. Patient had complaints of pain; see MAR for medications given. Will continue to observe.

## 2021-12-07 NOTE — PROGRESS NOTES
Hematology/Oncology Inpatient Progress Note    PATIENT NAME: Carmella Pelayo  : 1955  MRN: [id number]    CHIEF COMPLAINT: Lower abdominal pain and left hip pain    HISTORY OF PRESENT ILLNESS:      Carmella Pelayo is a 66 y.o. female who presented to [facility] on 2021 with left lower abdominal, left hip and leg pain accompanied with edema to left leg.  Pain is increased when standing and is constant.   Patient states pain returned a couple of days ago. Describes pain as tight band around her left lower abdomen. She can barely stand or apply pressure on left leg and is unable to walk.  Denies fever, nausea, vomiting, or diarrhea. Admits to decreased oral intake because nothing taste good since being on ABT.  Admits to hematuria this am. Patient had s/p percutaneous drain placement for left lower quadrant abdominal abscess .  Cultures grew group B streptococcus.  She was discharged to rehab on 21 on 2 weeks of IV rocephin and oral flagyl.  Patient was discharged from rehab yesterday. Percutaneous drain still intact and has not increased or changed in output or color.    In the ED patient had CT abdomen that showed evolving left peritoneal/retroperitoneal abscess and soft tissue phlegmon.  Surgical drain is present.  Larger components of this abscess collection appear significantly decreased compared to prior.  Small irregular lobulations in the left lower quadrant and along the left peritoneal wall persist and are not included communication with the drain.  Redemonstrated soft tissue defect and skin thickening in the perineum compatible with known vulvar cancer.  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  New enhancing crescentic fluid collection posterior to the greater trochanter which could reflect bursitis versus abscess.  Permeative changes in the left  iliac wing and left ischium concerning for active bony destruction.  Given adjacent soft tissue infection, findings are concerning for osteomyelitis in this location.  WBC normal, afebrile, tachycardic low 100s, not hypotensive.  CMP showed potassium 2.3.  She was started on IV cefepime and vancomycin. She was admitted for further treatment of left hip abscess, possible osteomyelitis of left iliac wing and ischium. ID will be consulted.    She has recurrent vulvar cancer s/p vulvectomy, radiation, and chemotherapy no longer a hospice patient but not receiving any current treatment for her cancer. She has chronic perirectal wounds from previous vulvectomy surgery 2019. She follows up with Dr.Rosaline Pillai.     12/06/21  Hematology/Oncology was consulted for possible recurrent vulvar cancer.  Patient is well-known to our clinic and is currently following with Dr.Rosaline Pillai. Patient had previously decided for comfort measures only and was followed by [facility] care. Patient states that she is no longer with Hospice care.  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  S/p left radical Jorge vulvectomy, and left inguinal LND on 4/18/2019.  Patient declined adjuvant radiation with weekly cisplatin.  New left-sided vulvar lesion recurrent disease 2/23/2020.  EUA, cystoscopy and vulvar biopsy 11/12/2020 reveals squamous cell carcinoma invasive, of vulva.  PET/CT 12/7/2020 shows recurrent vulvar squamous cell cancer at site of biopsy no regional or distant metastasis disease.  Keytruda started December 2020, development of pneumonitis 3/17/2021.  Personal decision made in August 2021 to forego aggressive treatment of her disease and focus on quality of life issues per Dr. Borja 9/22/21 note.   CT abdomen and pelvis was completed on 12/4/21 which showed then original normal abscess was significantly decreased and shows some small irregular lobulations of the left lower quadrant and along the left peritoneal  wall do not communicate with the drain. 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  There is new fluid collection posterior to the greater trochanter which could be bursitis versus abscess.  There is also changes in the left iliac vein and left ischium concerning for possible osteomyelitis.  She is currently receiving IV ABT.  No deep pelvic or inguinal adenopathy.          12/7/21: Patient seen and examined today.  Patient has additional KATIE drain placed.  Feeling better, pain well controlled. Denies fever, chills, chest pain, shortness of breath, nausea, vomiting, diarrhea, dysuria, or dizziness.         She  has a past medical history of Diabetes mellitus (HCC) and Vulva cancer (HCC).     PCP: Deana Wallace MD   History of present illness was reviewed and is unchanged from the previous visit. 12/07/21    I have reviewed and confirmed the accuracy of the patient's history: Chief complaint, HPI, ROS and Subjective as entered by the MA/LPN/RN. Shital Lainey Pillai MD 12/07/21       Subjective      Patient states she feels better. Pain well controlled.     ROS:    Review of Systems   Constitutional: Positive for fatigue. Negative for activity change, appetite change, chills, diaphoresis, fever and unexpected weight change.   HENT: Negative for congestion, dental problem, ear discharge, ear pain, facial swelling, hearing loss, mouth sores, nosebleeds, sore throat, tinnitus, trouble swallowing and voice change.    Eyes: Negative for photophobia and visual disturbance.   Respiratory: Negative for cough, choking, chest tightness, shortness of breath and wheezing.    Cardiovascular: Positive for leg swelling. Negative for chest pain and palpitations.        Improved   Gastrointestinal: Negative for abdominal distention, abdominal pain, blood in stool, constipation, diarrhea, nausea and vomiting.   Endocrine: Negative.    Genitourinary: Negative for decreased urine volume, difficulty urinating, dysuria, flank pain,  frequency, hematuria and urgency.   Musculoskeletal: Positive for myalgias. Negative for back pain, gait problem, joint swelling, neck pain and neck stiffness.   Skin: Positive for wound. Negative for color change and rash.        Patient presents with wounds to bilateral vulvectomy sites with constant drainage of clear effluent. The tissues are indurated and painful. The skin is red and denuded in areas from her inner thighs to her buttocks.    Allergic/Immunologic: Negative.    Neurological: Positive for weakness. Negative for dizziness, tremors, syncope, speech difficulty, numbness and headaches.   Hematological: Negative.    Psychiatric/Behavioral: Negative.         MEDICATIONS:    Scheduled Meds:  aluminum sulfate-calcium acetate 1:20, 1,000 mL, Topical, Q8H  cefTRIAXone, 2 g, Intravenous, Q24H  enoxaparin, 40 mg, Subcutaneous, Q24H  fluconazole, 100 mg, Oral, Q24H  folic acid, 1 mg, Oral, Daily  insulin lispro, 0-7 Units, Subcutaneous, Q6H  metroNIDAZOLE, 500 mg, Oral, Q8H  potassium chloride, 20 mEq, Oral, BID With Meals  sodium chloride, 10 mL, Intravenous, Q12H       Continuous Infusions:      PRN Meds:  •  acetaminophen **OR** acetaminophen **OR** acetaminophen  •  aluminum-magnesium hydroxide-simethicone  •  Calcium Gluconate-NaCl **AND** calcium gluconate **AND** Calcium, Ionized  •  dextrose  •  dextrose  •  glucagon (human recombinant)  •  HYDROcodone-acetaminophen  •  HYDROcodone-acetaminophen  •  insulin lispro **AND** insulin lispro  •  magnesium sulfate **OR** magnesium sulfate **OR** magnesium sulfate  •  melatonin  •  ondansetron **OR** ondansetron  •  potassium & sodium phosphates **OR** potassium & sodium phosphates  •  potassium chloride **OR** potassium chloride **OR** potassium chloride  •  [COMPLETED] Insert peripheral IV **AND** sodium chloride  •  sodium chloride     ALLERGIES:    Allergies   Allergen Reactions   • Meperidine Palpitations, Anxiety and Nausea And Vomiting     Other  "reaction(s): \"feel like I am going to pass out\", Feel like I am going to have panic attack, Irregular heart beat, Nausea     • Morphine Nausea And Vomiting, Anxiety and Palpitations       Objective      VITALS:     /75 (BP Location: Left arm, Patient Position: Lying)   Pulse 80   Temp 98.3 °F (36.8 °C) (Oral)   Resp 16   Ht 154.9 cm (61\")   Wt 59.6 kg (131 lb 5.3 oz)   SpO2 98%   BMI 24.81 kg/m²     PHYSICAL EXAM:     Physical Exam  Vitals and nursing note reviewed. Exam conducted with a chaperone present.   Constitutional:       General: She is not in acute distress.     Appearance: Normal appearance. She is normal weight. She is not ill-appearing, toxic-appearing or diaphoretic.   HENT:      Head: Normocephalic and atraumatic.      Right Ear: Tympanic membrane, ear canal and external ear normal.      Left Ear: Tympanic membrane, ear canal and external ear normal.      Nose: Nose normal. No congestion or rhinorrhea.      Mouth/Throat:      Mouth: Mucous membranes are moist.      Pharynx: Oropharynx is clear.   Eyes:      General:         Right eye: No discharge.         Left eye: No discharge.      Extraocular Movements: Extraocular movements intact.      Conjunctiva/sclera: Conjunctivae normal.      Pupils: Pupils are equal, round, and reactive to light.   Neck:      Vascular: No carotid bruit.   Cardiovascular:      Rate and Rhythm: Normal rate and regular rhythm.      Pulses: Normal pulses.      Heart sounds: Normal heart sounds. No murmur heard.  No friction rub. No gallop.    Pulmonary:      Effort: Pulmonary effort is normal. No respiratory distress.      Breath sounds: Normal breath sounds. No stridor. No wheezing, rhonchi or rales.   Chest:      Chest wall: No tenderness.   Abdominal:      General: Abdomen is flat. Bowel sounds are normal. There is no distension.      Palpations: Abdomen is soft. There is no mass.      Tenderness: There is abdominal tenderness. There is no guarding or rebound. "      Hernia: No hernia is present.      Comments: Improved- pain   Genitourinary:     Rectum: Normal.   Musculoskeletal:         General: No swelling, tenderness or signs of injury. Normal range of motion.      Cervical back: Normal range of motion and neck supple. No rigidity or tenderness.      Left lower leg: Edema present.   Lymphadenopathy:      Cervical: No cervical adenopathy.   Skin:     General: Skin is warm and dry.      Capillary Refill: Capillary refill takes less than 2 seconds.      Coloration: Skin is not jaundiced.      Findings: Lesion present. No erythema or rash.      Comments: Patient presents with wounds to bilateral vulvectomy sites with constant drainage of clear effluent. The tissues are indurated and painful. The skin is red and denuded in areas from her inner thighs to her buttocks.     Two drains intact with serosanguinous output   Neurological:      General: No focal deficit present.      Mental Status: She is alert and oriented to person, place, and time. Mental status is at baseline.      Cranial Nerves: No cranial nerve deficit.      Sensory: No sensory deficit.      Motor: No weakness.      Coordination: Coordination normal.      Gait: Gait normal.      Deep Tendon Reflexes: Reflexes normal.   Psychiatric:         Mood and Affect: Mood normal.         Behavior: Behavior normal.         Thought Content: Thought content normal.         Judgment: Judgment normal.      I have reexamined the patient and the results are consistent with the previously documented exam. Shital Pillai MD      RECENT LABS:    Lab Results (last 24 hours)     Procedure Component Value Units Date/Time    POC Glucose Once [[id number]]  (Abnormal) Collected: 12/07/21 1145    Specimen: Blood Updated: 12/07/21 1146     Glucose 121 mg/dL      Comment: Serial Number: [id number]:  [id number]       Blood Culture - Blood, Arm, Right [[id number]]  (Normal) Collected: 12/04/21 0928    Specimen: Blood from Arm,  Right Updated: 12/07/21 1115     Blood Culture No growth at 3 days    Body Fluid Culture - Body Fluid, Abdominal Cavity [[id number]] Collected: 12/07/21 0956    Specimen: Body Fluid from Abdominal Cavity Updated: 12/07/21 1046     Gram Stain Rare (1+) WBCs per low power field      No organisms seen    Blood Culture - Blood, Arm, Left [[id number]]  (Normal) Collected: 12/04/21 0931    Specimen: Blood from Arm, Left Updated: 12/07/21 1000     Blood Culture No growth at 3 days    POC Glucose Once [[id number]]  (Abnormal) Collected: 12/07/21 0615    Specimen: Blood Updated: 12/07/21 0616     Glucose 111 mg/dL      Comment: Serial Number: [id number]:  [id number]       Comprehensive Metabolic Panel [[id number]]  (Abnormal) Collected: 12/07/21 0420    Specimen: Blood Updated: 12/07/21 0525     Glucose 107 mg/dL      BUN 3 mg/dL      Creatinine 0.39 mg/dL      Sodium 138 mmol/L      Potassium 4.3 mmol/L      Chloride 103 mmol/L      CO2 29.0 mmol/L      Calcium 8.1 mg/dL      Total Protein 6.5 g/dL      Albumin 2.30 g/dL      ALT (SGPT) 5 U/L      AST (SGOT) 9 U/L      Alkaline Phosphatase 225 U/L      Total Bilirubin 0.3 mg/dL      eGFR Non African Amer >150 mL/min/1.73      Globulin 4.2 gm/dL      A/G Ratio 0.5 g/dL      BUN/Creatinine Ratio 7.7     Anion Gap 6.0 mmol/L     Narrative:      GFR Normal >60  Chronic Kidney Disease <60  Kidney Failure <15      Magnesium [[id number]]  (Normal) Collected: 12/07/21 0420    Specimen: Blood Updated: 12/07/21 0524     Magnesium 1.6 mg/dL     CBC & Differential [[id number]]  (Abnormal) Collected: 12/07/21 0421    Specimen: Blood Updated: 12/07/21 0447    Narrative:      The following orders were created for panel order CBC & Differential.  Procedure                               Abnormality         Status                     ---------                               -----------         ------                     CBC Auto Differential[[id number]]        Abnormal            Final result                  Please view results for these tests on the individual orders.    CBC Auto Differential [[id number]]  (Abnormal) Collected: 12/07/21 0421    Specimen: Blood Updated: 12/07/21 0447     WBC 6.80 10*3/mm3      RBC 3.59 10*6/mm3      Hemoglobin 9.0 g/dL      Hematocrit 28.0 %      MCV 78.0 fL      MCH 25.1 pg      MCHC 32.2 g/dL      RDW 21.6 %      RDW-SD 58.2 fl      MPV 7.3 fL      Platelets 258 10*3/mm3      Neutrophil % 60.6 %      Lymphocyte % 19.0 %      Monocyte % 10.8 %      Eosinophil % 8.7 %      Basophil % 0.9 %      Neutrophils, Absolute 4.10 10*3/mm3      Lymphocytes, Absolute 1.30 10*3/mm3      Monocytes, Absolute 0.70 10*3/mm3      Eosinophils, Absolute 0.60 10*3/mm3      Basophils, Absolute 0.10 10*3/mm3      nRBC 0.0 /100 WBC     POC Glucose Once [[id number]]  (Abnormal) Collected: 12/06/21 2350    Specimen: Blood Updated: 12/06/21 2352     Glucose 143 mg/dL      Comment: Serial Number: [id number]:  [id number]       Osmolality, Urine - [[id number]]  (Normal) Collected: 12/06/21 2224    Specimen: Urine Updated: 12/06/21 2302     Osmolality, Urine 321 mOsm/kg     Potassium, Urine, Random - [[id number]] Collected: 12/06/21 2224    Specimen: Urine Updated: 12/06/21 2253     Potassium, Urine 21.8 mmol/L     Narrative:      Reference intervals for random urine have not been established.  Clinical usage is dependent upon physician's interpretation in combination with other laboratory tests.       POC Glucose Once [[id number]]  (Abnormal) Collected: 12/06/21 2206    Specimen: Blood Updated: 12/06/21 2207     Glucose 153 mg/dL      Comment: Serial Number: [id number]:  [id number]       Osmolality, Serum [[id number]]  (Normal) Collected: 12/06/21 1936    Specimen: Blood Updated: 12/06/21 2141     Osmolality 291 mOsm/kg     Potassium [[id number]]  (Abnormal) Collected: 12/06/21 1934    Specimen: Blood Updated: 12/06/21 2139     Potassium 3.0 mmol/L         IMAGING REVIEWED:    CT Guided Abscess  Drain Peritoneal    Result Date: 12/6/2021  IMPRESSION : Placement of an additional 8 Sami pigtail drain catheter for drainage of abscess within the left lateral abdominal musculature, as described above.  Electronically Signed By-Shane Andrade MD On:12/6/2021 10:58 AM This report was finalized on [id number] by  Shane Andrade MD.      ASSESSMENT:    Recurrent vulvar cancer: Stage Ib grade 2 SCC of the vulva.  S/p left radical Jorge vulvectomy, and left inguinal LND on 4/18/2019.  Patient declined adjuvant radiation with weekly cisplatin.  New left-sided vulvar lesion recurrent disease 2/23/2020.  EUA, cystoscopy and vulvar biopsy 11/12/2020 reveals squamous cell carcinoma invasive, of vulva.  PET/CT 12/7/2020 shows recurrent vulvar squamous cell cancer at site of biopsy no regional or distant metastasis disease.  Keytruda started December 2020, development of pneumonitis 3/17/2021.  Personal decision made in August 2021 to forego aggressive treatment of her disease and focus on quality of life issues per Dr. Borja 9/22/21 note.  She was transitioned into hospice care but discharged as she wanted treatment for her pelvic abscess.  Patient is considering potentially treatment in the future if and when her infection resolves.  Also understands that this may be difficult to achieve given the extent of abscess she has in the intra-abdominal cavity.        Chronic perirectal wounds from previous vulvectomy surgery in 2019 along with moisture associated dermatitis.  Wound cultures from 11/20/21 grew group B streptococcus.       Large intra-abdominal abscess of left lower quadrant, s/p percutaneous drain placement 11/18/2021:  Recurrent abscess or rectocutaneous fistula.  Fluid culture collected from percutaneous drain on 12/3/21 from  office.  CT abdomen and pelvis was completed on 12/4/21 which showed then original normal abscess was significantly decreased and shows some small irregular lobulations of the  left lower quadrant and along the left peritoneal wall do not communicate with the drain. 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  There is new fluid collection posterior to the greater trochanter which could be bursitis versus abscess.  There is also changes in the left iliac vein and left ischium concerning for possible osteomyelitis.  She is currently receiving IV ABT.  Patient was discharged on 11/21/21 to rehab to complete 2 week of IV rocephin and oral flagyl after her fluid culture grew group B streptococcus.  Infectious disease and general surgery consulted.Cultures are pending.  New drainage tube replaced     Abscess left hip: followed by ID     Osteomyelitis of left side of pelvis: followed by ID     Leukocytosis: resolved.      Thrombocytosis: Platelet 258.. Will continue to monitor CBC daily     Anemia: hemoglobin 9.0  Related to disease process, immunotherapy, chronic illness.   iron level 16.       Folate deficiency: previous Folate level 4.09 Continue Folic acid 1 mg daily     Hypokalemia/Hypomagnesiumia:   Replete per primary     Other chronic conditions: DM type II, dermatitis associated with moisture, urinary tract infection,     PLANS:     1. Continue IV abt  2. S/p replacement percutaneous drain   3. Follow with general surgery,wound nurse and Infectious disease  4. Wound culture preliminary results shows no organisms.  5. CBC/diff daily  6. Continue Folic acid 1mg po daily  7. Continue Supportive care  8. Will continue to follow               Electronically signed by Shital Pillai MD, 12/07/21, 5:41 PM EST.

## 2021-12-07 NOTE — PROGRESS NOTES
Infectious Diseases Progress Note      LOS: 3 days   Patient Care Team:  Deana Wallace MD as PCP - General (Family Medicine)    Chief Complaint: Weakness    Subjective     The patient had no fever during the last 24 hours. She remained hemodynamically stable.  Patient is currently on room air and does not have any new complaints other than occasional nausea    Review of Systems:   Review of Systems   Constitutional: Negative.    HENT: Negative.    Eyes: Negative.    Respiratory: Negative.    Cardiovascular: Negative.    Gastrointestinal: Positive for nausea.   Genitourinary: Negative.    Musculoskeletal: Negative.    Skin: Negative.    Neurological: Negative.    Hematological: Negative.    Psychiatric/Behavioral: Negative.         Objective     Vital Signs  Temp:  [98.1 °F (36.7 °C)-98.7 °F (37.1 °C)] 98.1 °F (36.7 °C)  Heart Rate:  [80-85] 81  Resp:  [16] 16  BP: (117-119)/(71-76) 118/76    Physical Exam:  Physical Exam  Vitals and nursing note reviewed.   Constitutional:       Appearance: She is well-developed.   HENT:      Head: Normocephalic and atraumatic.   Eyes:      Pupils: Pupils are equal, round, and reactive to light.   Cardiovascular:      Rate and Rhythm: Normal rate and regular rhythm.      Heart sounds: Normal heart sounds.   Pulmonary:      Effort: Pulmonary effort is normal. No respiratory distress.      Breath sounds: Normal breath sounds. No wheezing or rales.   Abdominal:      General: Bowel sounds are normal. There is no distension.      Palpations: Abdomen is soft. There is no mass.      Tenderness: There is abdominal tenderness. There is no guarding or rebound.      Comments: 2 KATIE drains in place with purulent drainage   Genitourinary:     Comments: Chronic perirectal wounds  Musculoskeletal:         General: No deformity. Normal range of motion.      Cervical back: Normal range of motion and neck supple.   Skin:     General: Skin is warm.      Findings: No erythema or rash.    Neurological:      Mental Status: She is alert and oriented to person, place, and time.      Cranial Nerves: No cranial nerve deficit.          Results Review:    I have reviewed all clinical data, test, lab, and imaging results.     Radiology  No Radiology Exams Resulted Within Past 24 Hours    Cardiology    Laboratory    Results from last 7 days   Lab Units 12/07/21 0421 12/06/21 0514 12/05/21  0347 12/04/21  0422   WBC 10*3/mm3 6.80 7.50 7.60 8.60   HEMOGLOBIN g/dL 9.0* 9.0* 8.6* 9.7*   HEMATOCRIT % 28.0* 28.4* 26.7* 29.7*   PLATELETS 10*3/mm3 258 258 262 312     Results from last 7 days   Lab Units 12/07/21  0420 12/06/21  1934 12/06/21 0514 12/05/21 2208 12/05/21  1116 12/05/21 0347 12/04/21  1335 12/04/21  0422 12/04/21  0422   SODIUM mmol/L 138  --  141  --   --  140 141  --  138   POTASSIUM mmol/L 4.3 3.0* 2.9* 3.2* 3.1* 2.1* 2.5*   < > 2.3*   CHLORIDE mmol/L 103  --  102  --   --  98 99  --  94*   CO2 mmol/L 29.0  --  30.0*  --   --  32.0* 31.0*  --  31.0*   BUN mg/dL 3*  --  3*  --   --  5* 5*  --  5*   CREATININE mg/dL 0.39*  --  0.35*  --   --  0.46* 0.38*  --  0.43*   GLUCOSE mg/dL 107*  --  95  --   --  100* 140*  --  154*   ALBUMIN g/dL 2.30*  --  2.30*  --   --   --   --   --  2.70*   BILIRUBIN mg/dL 0.3  --  0.2  --   --   --   --   --  0.6   ALK PHOS U/L 225*  --  252*  --   --   --   --   --  353*   AST (SGOT) U/L 9  --  10  --   --   --   --   --  7   ALT (SGPT) U/L 5  --  <5  --   --   --   --   --  8   LIPASE U/L  --   --   --   --   --   --   --   --  13   CALCIUM mg/dL 8.1*  --  7.9*  --   --  7.8* 7.9*  --  8.6    < > = values in this interval not displayed.                 Microbiology   Microbiology Results (last 10 days)     Procedure Component Value - Date/Time    Body Fluid Culture - Body Fluid, Abdominal Cavity [[id number]] Collected: 12/07/21 0956    Lab Status: Preliminary result Specimen: Body Fluid from Abdominal Cavity Updated: 12/07/21 1046     Gram Stain Rare (1+) WBCs per  low power field      No organisms seen    Blood Culture - Blood, Arm, Left [[id number]]  (Normal) Collected: 12/04/21 0931    Lab Status: Preliminary result Specimen: Blood from Arm, Left Updated: 12/07/21 1000     Blood Culture No growth at 3 days    Blood Culture - Blood, Arm, Right [[id number]]  (Normal) Collected: 12/04/21 0928    Lab Status: Preliminary result Specimen: Blood from Arm, Right Updated: 12/07/21 1115     Blood Culture No growth at 3 days    COVID PRE-OP / PRE-PROCEDURE SCREENING ORDER (NO ISOLATION) - Swab, Nasopharynx [[id number]]  (Normal) Collected: 12/04/21 0719    Lab Status: Final result Specimen: Swab from Nasopharynx Updated: 12/04/21 0814    Narrative:      The following orders were created for panel order COVID PRE-OP / PRE-PROCEDURE SCREENING ORDER (NO ISOLATION) - Swab, Nasopharynx.  Procedure                               Abnormality         Status                     ---------                               -----------         ------                     COVID-19,CEPHEID/ISELA/BD...[[id number]]  Normal              Final result                 Please view results for these tests on the individual orders.    COVID-19,CEPHEID/ISELA/BDMAX,COR/TUNDE/PAD/KIMO IN-HOUSE(OR EMERGENT/ADD-ON),NP SWAB IN TRANSPORT MEDIA 3-4 HR TAT, RT-PCR - Swab, Nasopharynx [[id number]]  (Normal) Collected: 12/04/21 0719    Lab Status: Final result Specimen: Swab from Nasopharynx Updated: 12/04/21 0814     COVID19 Not Detected    Narrative:      Fact sheet for providers: https://www.fda.gov/media/863280/download     Fact sheet for patients: https://www.fda.gov/media/662031/download  Fact sheet for providers: https://www.fda.gov/media/723956/download     Fact sheet for patients: https://www.fda.gov/media/868746/download    Urine Culture - Urine, Urine, Clean Catch [[id number]]  (Abnormal) Collected: 12/04/21 0548    Lab Status: Final result Specimen: Urine, Clean Catch Updated: 12/05/21 1323     Urine Culture Yeast isolated      Comment: No further workup.           <25,000 CFU/mL Mixed Juwan Isolated    Narrative:      Specimen contains mixed organisms of questionable pathogenicity which indicates contamination with commensal juwan.  Further identification is unlikely to provide clinically useful information.  Suggest recollection.    Body Fluid Culture - Body Fluid, Peritoneum [[id number]] Collected: 12/03/21 1214    Lab Status: Preliminary result Specimen: Body Fluid from Peritoneum Updated: 12/07/21 0726     Body Fluid Culture No growth at 4 days     Gram Stain Moderate (3+) WBCs per low power field      No organisms seen          Medication Review:       Schedule Meds  aluminum sulfate-calcium acetate 1:20, 1,000 mL, Topical, Q8H  cefTRIAXone, 2 g, Intravenous, Q24H  enoxaparin, 40 mg, Subcutaneous, Q24H  folic acid, 1 mg, Oral, Daily  insulin lispro, 0-7 Units, Subcutaneous, Q6H  metroNIDAZOLE, 500 mg, Oral, Q8H  potassium chloride, 20 mEq, Oral, BID With Meals  sodium chloride, 10 mL, Intravenous, Q12H        Infusion Meds       PRN Meds  •  acetaminophen **OR** acetaminophen **OR** acetaminophen  •  aluminum-magnesium hydroxide-simethicone  •  Calcium Gluconate-NaCl **AND** calcium gluconate **AND** Calcium, Ionized  •  dextrose  •  dextrose  •  glucagon (human recombinant)  •  HYDROcodone-acetaminophen  •  HYDROcodone-acetaminophen  •  insulin lispro **AND** insulin lispro  •  magnesium sulfate **OR** magnesium sulfate **OR** magnesium sulfate  •  melatonin  •  ondansetron **OR** ondansetron  •  potassium & sodium phosphates **OR** potassium & sodium phosphates  •  potassium chloride **OR** potassium chloride **OR** potassium chloride  •  [COMPLETED] Insert peripheral IV **AND** sodium chloride  •  sodium chloride        Assessment/Plan       Antimicrobial Therapy   1. IV ceftriaxone        2. P.o. metronidazole        3.  P.o. Diflucan        4.        5.                Assessment     Recurrent abdominal abscesses.  The current  CT the abdomen is shows the original normal abscess was significantly decreased but there are no new fluid collections along the  left lower quadrant and along the left peritoneal wall that do not communicate with the drain.  There is some concern for possible retrocutaneous fistula.  There is a new fluid collection posterior to the greater trochanter could be bursitis versus abscess.    -Patient has continued lower abdominal pain  -S/p IR placement of new drain tube on December 6, 2021     Concern for possible new finding of osteomyelitis versus metastatic disease  CT also showed some changes in the left iliac vein and  left ischium concerning for possible osteomyelitis.    -Patient complains about recent pain to her left hip and left leg with some subsequent swelling     Recent history of large left lower quadrant abscess.    Abscess was drained on 11/18/2021 and 400 mL of purulent drainage was removed.  Cultures are showing heavy growth of group B streptococcus.  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  However patient does have some chronic perirectal wounds  -Patient completed 2 weeks of IV Rocephin and p.o. 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   Fairly significant denuded areas with some drainage.  -Wound cultures from 11/20/2021 also grew group B streptococcus     Type 2 diabetes-uncontrolled patient's A1c is 10     History of vulvar cancer with a vulvectomy and radiation and chemotherapy treatments approximately 2 years ago.  Patient has had no recent treatments and-she reports that the cancer has returned and she cannot tolerate chemotherapy  -Patient was hospice but is currently not with that service and since she is getting treatment for infections  -Patient still has a port    Candiduria-patient does not have a Moore catheter in        Plan     Continue IV Rocephin 2 g every 24 for 4  weeks  Continue p.o. Flagyl 500 mg 3 times daily for 4 weeks  Fluid culture pending  P.o. Diflucan 100 mg daily for 7 days  Patient has a port  Continue supportive care  A.m. labs  Long-term prognosis is guarded  Case discussed with RN at bedside    Aleyda Pinto, MANISH  12/07/21  13:34 EST    Note is dictated utilizing voice recognition software/Dragon

## 2021-12-07 NOTE — PLAN OF CARE
Problem: Fall Injury Risk  Goal: Absence of Fall and Fall-Related Injury  Outcome: Ongoing, Progressing  Intervention: Identify and Manage Contributors to Fall Injury Risk  Recent Flowsheet Documentation  Taken 12/6/2021 1135 by Damián Ren RN  Medication Review/Management: medications reviewed  Intervention: Promote Injury-Free Environment  Recent Flowsheet Documentation  Taken 12/6/2021 1600 by Damián Ren RN  Safety Promotion/Fall Prevention:   assistive device/personal items within reach   safety round/check completed  Taken 12/6/2021 1135 by Damián Ren RN  Safety Promotion/Fall Prevention:   safety round/check completed   nonskid shoes/slippers when out of bed   fall prevention program maintained   assistive device/personal items within reach   clutter free environment maintained     Problem: Skin Injury Risk Increased  Goal: Skin Health and Integrity  Outcome: Ongoing, Progressing  Intervention: Optimize Skin Protection  Recent Flowsheet Documentation  Taken 12/6/2021 1135 by Damián Ren RN  Head of Bed (HOB): HOB at 30 degrees     Problem: Adult Inpatient Plan of Care  Goal: Plan of Care Review  Outcome: Ongoing, Progressing  Flowsheets (Taken 12/6/2021 1939)  Progress: improving  Plan of Care Reviewed With: patient  Outcome Summary: pt resting well, KATIE drain working well. vitals charted. will continue to monitor  Goal: Patient-Specific Goal (Individualized)  Outcome: Ongoing, Progressing  Goal: Absence of Hospital-Acquired Illness or Injury  Outcome: Ongoing, Progressing  Intervention: Identify and Manage Fall Risk  Recent Flowsheet Documentation  Taken 12/6/2021 1600 by Damián Ren RN  Safety Promotion/Fall Prevention:   assistive device/personal items within reach   safety round/check completed  Taken 12/6/2021 1135 by Damián Ren RN  Safety Promotion/Fall Prevention:   safety round/check completed   nonskid shoes/slippers when out of bed   fall prevention  program maintained   assistive device/personal items within reach   clutter free environment maintained  Intervention: Prevent Skin Injury  Recent Flowsheet Documentation  Taken 12/6/2021 1600 by Damián Ren RN  Body Position:   position changed independently   supine  Taken 12/6/2021 1135 by Damián Ren RN  Body Position:   position changed independently   supine  Intervention: Prevent Infection  Recent Flowsheet Documentation  Taken 12/6/2021 1135 by Damián Ren RN  Infection Prevention:   personal protective equipment utilized   hand hygiene promoted  Goal: Optimal Comfort and Wellbeing  Outcome: Ongoing, Progressing  Goal: Readiness for Transition of Care  Outcome: Ongoing, Progressing   Goal Outcome Evaluation:  Plan of Care Reviewed With: patient        Progress: improving  Outcome Summary: pt resting well, KATIE drain working well. vitals charted. will continue to monitor

## 2021-12-07 NOTE — PLAN OF CARE
Goal Outcome Evaluation:      Objective:     Pt completed bed mobility tasks with rolling left and right and scooting up in bed with SBA.  Pt completed supine to sit transfer with SBA and sit to supine transfer with Min a.  Pt completed sit to stand transfer with Min a.      Assessment: Carmella Pelayo presents with functional mobility impairments which indicate the need for skilled intervention. Pt showing increased independence with all bed mobility and transfers this date but required increased assistance to come to standing from lower surface. Pt would benefit from inpatient rehab placement upon d/c to further increase mm strength, endurance, and overall mobility at this time. Tolerating session today without incident. Will continue to follow and progress as tolerated.

## 2021-12-07 NOTE — THERAPY TREATMENT NOTE
Subjective: Pt agreeable to therapeutic plan of care.    Objective:     Pt completed bed mobility tasks with rolling left and right and scooting up in bed with SBA.  Pt completed supine to sit transfer with SBA and sit to supine transfer with Min a.  Pt completed sit to stand transfer with Min a.    Pain: 0 VAS  Education: Provided education on importance of mobility and skilled verbal / tactile cueing throughout intervention.     Assessment: Carmella Pelayo presents with functional mobility impairments which indicate the need for skilled intervention. Pt showing increased independence with all bed mobility and transfers this date but required increased assistance to come to standing from lower surface. Pt would benefit from inpatient rehab placement upon d/c to further increase mm strength, endurance, and overall mobility at this time. Tolerating session today without incident. Will continue to follow and progress as tolerated.     Plan/Recommendations:   Pt would benefit from inpatient rehab at discharge from facility and requires rolling walker at discharge.   Pt desires inpatient rehab at discharge. Pt cooperative; agreeable to therapeutic recommendations and plan of care.     Basic Mobility 6-click:  Rollin = Total, A lot = 2, A little = 3; 4 = None  Supine>Sit:   1 = Total, A lot = 2, A little = 3; 4 = None   Sit>Stand with arms:  1 = Total, A lot = 2, A little = 3; 4 = None  Bed>Chair:   1 = Total, A lot = 2, A little = 3; 4 = None  Ambulate in room:  1 = Total, A lot = 2, A little = 3; 4 = None  3-5 Steps with railin = Total, A lot = 2, A little = 3; 4 = None  Score: 22    Modified [place]: N/A = No pre-op stroke/TIA    Post-Tx Position: Supine with HOB Elevated and call light in reach.  PPE: gloves, surgical mask, eyewear protection

## 2021-12-07 NOTE — PROGRESS NOTES
[facility] Medicine Services Daily Progress Note    Patient Name: Carmella Pelayo  : 1955  MRN: [id number]  Primary Care Physician:  Deana Wallace MD  Date of admission: 2021      Subjective      Chief Complaint: Abdominal discomfort      Hypokalemia has been noticed  She denies pain or vomiting or diarrhea    Repeat potassium better  Awaiting IR/CT-guided drainage  Orthopedic surgeon not planning on any intervention    2021: Patient seen and examined today.  Seen after the CT drain placement.  Doing well, pain well controlled.  No other complaints.    2021: Patient seen and examined this morning.  Continues to feel better, pain well controlled.  Tolerating diet well.  No other complaints.    Denies fever, chills, chest pain, shortness of breath, nausea, vomiting, diarrhea, dysuria, or dizziness.    Objective      Vitals:   Temp:  [98.1 °F (36.7 °C)-98.7 °F (37.1 °C)] 98.1 °F (36.7 °C)  Heart Rate:  [80-85] 80  Resp:  [16-17] 16  BP: (117-124)/(71-79) 119/71    Physical exam:  General: Awake, alert, elderly female, lying in bed, NAD  Eyes: PERRL, EOMI, conjunctive are clear  Cardiovascular: Regular rate and rhythm, no murmurs  Respiratory: Clear to auscultation bilaterally, no wheezing or rales, unlabored breathing  Abdomen: Soft, nontender, left quadrant drain same place, positive bowel sounds, no guarding  Neurologic: A&O, CN grossly intact, moves all extremities spontaneously  Musculoskeletal: Normal range of motion, no deformities  Skin: Warm, dry, intact           Result Review    Result Review:  I have personally reviewed the results from the time of this admission to 2021 10:25 EST and agree with these findings:  [x]  Laboratory  [x]  Microbiology  []  Radiology  []  EKG/Telemetry   []  Cardiology/Vascular   []  Pathology  []  Old records  []  Other:  Most notable findings include: As outlined above  Wounds (last 24 hours)     LDA Wound     Row  Name 12/07/21 0730 12/06/21 1917 12/06/21 1600       Wound 12/04/21 1845 Bilateral coccyx    Wound - Properties Group Placement Date: 12/04/21 - Placement Time: 1845  -SS Present on Hospital Admission: Y  -SS Side: Bilateral  -SS Location: coccyx  -SS Stage, Pressure Injury : other (see comments)  -SS, MASD     Dressing Appearance open to air  -JM open to air  -CA --    Closure None  -JM None  -CA None  -NG    Base pink; red  -JM pink; red  -CA pink; red  -NG    Edges irregular  -JM irregular  -CA irregular  -NG    Drainage Amount none  -JM none  -CA none  -NG    Care, Wound barrier applied  -JM barrier applied  -CA --    Dressing Care skin barrier agent applied  -JM skin barrier agent applied  -CA --    Retired Wound - Properties Group Date first assessed: 12/04/21 - Time first assessed: 1845  -SS Present on Hospital Admission: Y  -SS Side: Bilateral  -SS Location: coccyx  -SS    Row Name 12/06/21 1135             Wound 12/04/21 1845 Bilateral coccyx    Wound - Properties Group Placement Date: 12/04/21  - Placement Time: 1845  -SS Present on Hospital Admission: Y  -SS Side: Bilateral  -SS Location: coccyx  -SS Stage, Pressure Injury : other (see comments)  -SS, MASD       Closure None  -NG      Base pink; red  -NG      Edges irregular  -NG      Drainage Amount none  -NG      Retired Wound - Properties Group Date first assessed: 12/04/21 - Time first assessed: 1845  -SS Present on Hospital Admission: Y  -SS Side: Bilateral  -SS Location: coccyx  -SS            User Key  (r) = Recorded By, (t) = Taken By, (c) = Cosigned By    Initials Name Provider Type    SS Lindsey Silva, LPN Licensed Nurse    Damián Hill RN Registered Nurse    Carly Rainey RN Registered Nurse    Collins Mendez, RN Registered Nurse                  Assessment/Plan      Brief Patient Summary:  Carmella Pelayo is a 66 y.o. female   Carmella Pelayo is a 66 y.o. female with left lower quadrant abdominal abscess  s/p percutaneous drain placement 11/18/2021. Cultures grew group B streptococcus. She was discharged to rehab on 11/21/2021 on 2 weeks of IV rocephin and 2 weeks of oral flagyl. She saw Dr. Carpenter yesterday and was told that her antibiotics were going to change as yesterday was supposed to be her last day of IV rocephin per her report. She did not have any antibiotics 12/3/2021. She was discharged from rehab to home yesterday 12/3/2021. She returns to the ED 12/4/2021 with complaints of lower abdominal pain and left hip pain. She stated her left lower abdominal pain returned a few days ago. She feels pressure like a seat belt is wrapped around her left lower abdomen. Her output from her percutaneous drain has not increased or changed in color. She has left hip, groin, and leg pain. Her left leg is swollen. The pain is a severe ache that worsens with standing. She can barely put pressure on her leg and is unable to walk. She denied any fever, nausea, vomiting, or diarrhea. She has had decreased oral intake because nothing tastes good since being on antibiotics. She did have some hematuria this morning. She called EMS to take her to the ED.   In the ED patient had CT abdomen that showed evolving left peritoneal/retroperitoneal abscess and soft tissue phlegmon. Surgical drain is present. Larger components of this abscess collection appear significantly decreased compared to prior. Small irregular lobulations in the left lower quadrant and along the left peritoneal wall persist and are not included communication with the drain. Redemonstrated soft tissue defect and skin thickening in the perineum compatible with known vulvar cancer. 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 New enhancing crescentic fluid collection posterior to the greater trochanter which could reflect bursitis versus abscess.  Permeative changes in the left iliac wing and left ischium concerning for active bony destruction. Given adjacent soft tissue infection, findings are concerning for osteomyelitis in this location. WBC normal, afebrile, tachycardic low 100s, not hypotensive. CMP showed potassium 2.3. She was started on IV cefepime and vancomycin. She was admitted for further treatment of left hip abscess, possible osteomyelitis of left iliac wing and ischium. ID will be consulted.   She has recurrent vulvar cancer s/p vulvectomy, radiation, and chemotherapy no longer a hospice patient but not receiving any current treatment for her cancer. She has chronic perirectal wounds from previous vulvectomy surgery 2019.         aluminum sulfate-calcium acetate 1:20, 1,000 mL, Topical, Q8H  cefTRIAXone, 2 g, Intravenous, Q24H  folic acid, 1 mg, Oral, Daily  insulin lispro, 0-7 Units, Subcutaneous, Q6H  metroNIDAZOLE, 500 mg, Oral, Q8H  sodium chloride, 10 mL, Intravenous, Q12H       sodium chloride 0.9 % with KCl 20 mEq, 50 mL/hr, Last Rate: 50 mL/hr (12/07/21 0130)         Active Hospital Problems:  Active Hospital Problems    Diagnosis    • **Abscess of left hip    • Intra-abdominal abscess (HCC)    • Osteomyelitis of left side of pelvis (HCC)    • Left hip pain    • Hypokalemia    • Hypomagnesemia    • Malignant neoplasm of vulva (HCC)      Squamous cell carcinoma     • Left lower quadrant abdominal pain    • Anemia    • Type 2 diabetes mellitus (HCC)      Patient no longer taking metformin d/t insurance issues.  Patient no longer taking metformin d/t insurance issues.       Plan:     LLQ abdominal abscesses  -CT findings noted  -s/p IR Drain placement 11/18/2021 and on 12/5  -fluid culture collected from perc drain 12/3/2021 at Dr. Carpenter's office  -pt was discharged on 11/21/2021 to rehab to complete 2 weeks of IV rocephin and oral flagyl after her fluid culture grew group B streptococcus. She reported she did not have her last day of IV  rocephin 12/3 but did complete the oral flagyl.   -Continue IV Rocephin and Flagyl per ID for total of 4 more weeks  -Repeat cultures here negative so far  -ID and Gen. Sx following    Left hip pain, possible OM vs metastatic disease  -Noted on CT  -Ortho signed off, recommending antibiotics and IR drainage  -Management as above    Recurrent vulvar cancer  -s/p hemivulvectomy and radiation with chemo 2 years ago  -CT findings noted  -Patient was previously on hospice but discharged and is not on any treatment currently  -Oncology following    Chronic perirectal wounds from previous vulvectomy surgery in 2019 along with moisture associated dermatitis.  Wound cultures from 11/20/21 grew group B streptococcus.    Hypokalemia  Hypomagnesemia   -Replace and monitor  -Nephrology following    DM2  -Monitor blood glucose, continue sliding scale  -Adjust as needed     DVT prophylaxis  -Lovenox    CODE STATUS:    Level Of Support Discussed With: Patient  Code Status (Patient has no pulse and is not breathing): CPR (Attempt to Resuscitate)  Medical Interventions (Patient has pulse or is breathing): Full Support      Disposition:  I expect patient to be discharged home health once cleared by general surgery, oncology, and ID    This patient has been examined wearing appropriate Personal Protective Equipment on 12/07/21      Electronically signed by Rakel Stoddard DO, 12/07/21, 10:25 EST.  [facility]ist [facility]

## 2021-12-07 NOTE — CONSULTS
"Nutrition Services    Patient Name: Carmella Pelayo  YOB: 1955  MRN: [id number]  Admission date: 12/4/2021    Comment:    CC diet with Boost Plus BID (Provides 720 kcals, 28 g protein if consumed)     Moderate chronic disease related malnutrition related to chronic inadequate calorie intake in the context of vulvular cancer as evidenced by pt report of po intake providing less than 75% of calorie needs for greater than 1 month PTA and physical exam revealing areas of moderate muscle loss.    PPE Documentation        PPE Worn By Provider mask, gloves and eye protection   PPE Worn By Patient  none     CLINICAL NUTRITION ASSESSMENT      Reason for Assessment 12/7/21: KYLIE consult, MST 3     H&P      Past Medical History:   Diagnosis Date   • Diabetes mellitus (HCC)    • Vulva cancer (HCC)        Past Surgical History:   Procedure Laterality Date   • VULVECTOMY          Current Problems   LLQ Abdominal abscess    L hip pain    Recurrent vulvular cancer  -s/p hemivulvectomy and radiation with chemo 2 years ago    Chronic perirectal wounds    Hypokalemia    DM     Encounter Information        Trending Narrative     12/7: Visited patient in room. Reports her appetite is good, but eating less than 50% of meals. Reports eating small meals at home due to getting full quickly and having taste issues. Family member at bedside states pt \"eats like a bird\" and has not been eating well for a few months PTA. Eats canned chicken noodle soup and other \"plain foods\" per her report. Does not take a nutrition supplement at home, but agreeable to trying here.      Anthropometrics        Current Height, Weight Height: 154.9 cm (61\")  Weight: 59.6 kg (131 lb 5.3 oz) (12/07/21 0518)       Ideal Body Weight (IBW) 105lb   Usual Body Weight (UBW) Pt is unsure       Trending Weight Hx     This admission: Wt up 10lb since admit             PTA: Stable x1 month, no other wt hx available    Wt Readings from Last 30 Encounters: "   12/07/21 0518 59.6 kg (131 lb 5.3 oz)   12/06/21 0525 59.2 kg (130 lb 8.2 oz)   12/05/21 0400 57.1 kg (125 lb 14.4 oz)   12/04/21 0336 55 kg (121 lb 4.1 oz)   12/03/21 1029 55.8 kg (123 lb)   11/21/21 0416 63 kg (138 lb 14.2 oz)   11/20/21 0404 62.8 kg (138 lb 8 oz)   11/19/21 0415 63.4 kg (139 lb 11.2 oz)   11/18/21 1038 59 kg (130 lb)      BMI kg/m2 Body mass index is 24.81 kg/m².       Labs        Pertinent Labs    Results from last 7 days   Lab Units 12/07/21  0420 12/06/21  1934 12/06/21  0514 12/05/21  1116 12/05/21  0347 12/04/21  1335 12/04/21  0422   SODIUM mmol/L 138  --  141  --  140   < > 138   POTASSIUM mmol/L 4.3 3.0* 2.9*   < > 2.1*   < > 2.3*   CHLORIDE mmol/L 103  --  102  --  98   < > 94*   CO2 mmol/L 29.0  --  30.0*  --  32.0*   < > 31.0*   BUN mg/dL 3*  --  3*  --  5*   < > 5*   CREATININE mg/dL 0.39*  --  0.35*  --  0.46*   < > 0.43*   CALCIUM mg/dL 8.1*  --  7.9*  --  7.8*   < > 8.6   BILIRUBIN mg/dL 0.3  --  0.2  --   --   --  0.6   ALK PHOS U/L 225*  --  252*  --   --   --  353*   ALT (SGPT) U/L 5  --  <5  --   --   --  8   AST (SGOT) U/L 9  --  10  --   --   --  7   GLUCOSE mg/dL 107*  --  95  --  100*   < > 154*    < > = values in this interval not displayed.     Results from last 7 days   Lab Units 12/07/21  0421 12/07/21  0420 12/06/21  0514 12/05/21  0347 12/04/21  0422 12/04/21  0422   MAGNESIUM mg/dL  --  1.6  --  2.2  --  1.3*   HEMOGLOBIN g/dL 9.0*  --    < > 8.6*   < > 9.7*   HEMATOCRIT % 28.0*  --    < > 26.7*   < > 29.7*    < > = values in this interval not displayed.     COVID19   Date Value Ref Range Status   12/04/2021 Not Detected Not Detected - Ref. Range Final     Lab Results   Component Value Date    HGBA1C 10.0 (H) 11/18/2021        Medications    Scheduled Medications aluminum sulfate-calcium acetate 1:20, 1,000 mL, Topical, Q8H  cefTRIAXone, 2 g, Intravenous, Q24H  enoxaparin, 40 mg, Subcutaneous, Q24H  fluconazole, 100 mg, Oral, Q24H  folic acid, 1 mg, Oral,  "Daily  insulin lispro, 0-7 Units, Subcutaneous, Q6H  metroNIDAZOLE, 500 mg, Oral, Q8H  potassium chloride, 20 mEq, Oral, BID With Meals  sodium chloride, 10 mL, Intravenous, Q12H        Infusions      PRN Medications •  acetaminophen **OR** acetaminophen **OR** acetaminophen  •  aluminum-magnesium hydroxide-simethicone  •  Calcium Gluconate-NaCl **AND** calcium gluconate **AND** Calcium, Ionized  •  dextrose  •  dextrose  •  glucagon (human recombinant)  •  HYDROcodone-acetaminophen  •  HYDROcodone-acetaminophen  •  insulin lispro **AND** insulin lispro  •  magnesium sulfate **OR** magnesium sulfate **OR** magnesium sulfate  •  melatonin  •  ondansetron **OR** ondansetron  •  potassium & sodium phosphates **OR** potassium & sodium phosphates  •  potassium chloride **OR** potassium chloride **OR** potassium chloride  •  [COMPLETED] Insert peripheral IV **AND** sodium chloride  •  sodium chloride     Physical Findings        Trending Physical   Appearance, NFPE 12/7: NFPE completed, pt meets criteria for moderate malnutrition   --  Edema  none     Bowel Function Small BM 12/7   Tubes none   Chewing/Swallowing No issues reported   Skin Perirectal wounds     Estimated/Assessed Needs       Energy Requirements    Height for Calculation  Height: 154.9 cm (61\")   Weight for Calculation    Method for Estimation     EST Needs (kcal/day)        Protein Requirements    Weight for Calculation    EST Protein Needs (g/kg)    EST Daily Needs (g/day)        Fluid Requirements     Estimated Needs (mL/day)        Fluid Deficit    Current Na Level (mEq/L)    Desired Na Level (mEq/L)    Estimated Fluid Deficit (L)       Current Nutrition Orders & Evaluation of Intake       Oral Nutrition     Food Allergies    Current PO Diet Diet Diabetic/Consistent Carbs; Diabetic - Consistent Carb   Supplement    PO Evaluation     Trending % PO Intake 0-25%     Enteral Nutrition    Enteral Route    TF Modular    TF Delivery Method    Current TF Order  "   Current Water Flush     TF Residual/Tolerance     TF Observation         Parenteral Nutrition     TPN Route    Current TPN Order    Lipids (mL/%/frequency)     MVI Frequency     Trace Element Frequency     Total # Days on TPN    TPN Observation         Nutrition Course Details    PO Diets: 12/4 NPO, Regular  12/6 NPO, CC   Nutrition Support:      Nutritional Risk Screening        NRS-2002 Score          Nutrition Diagnosis         Nutrition Dx Problem 1 Moderate chronic disease related malnutrition related to chronic inadequate calorie intake in the context of vulvular cancer as evidenced by pt report of po intake providing less than 75% of calorie needs for greater than 1 month PTA and physical exam revealing areas of moderate muscle loss.      Nutrition Dx Problem 2        Intervention Goal         Intervention Goal(s) Meal intake at least 65% of meals  Consumption of ONS     Nutrition Intervention        RD Action Add Boost Plus BID     Nutrition Prescription          Diet Prescription Consistent Carb   Supplement Prescription Boost Plus BID (monitor blood sugars and need for GC variety)     Enteral Prescription        TPN Prescription      Monitor/Evaluation        Monitor PO intake, Supplement intake, Pertinent labs, Weight, Skin status, GI status       Malnutrition Severity Assessment      Patient meets criteria for : Moderate (non-severe) Malnutrition  Malnutrition Type (last 8 hours)     Malnutrition Severity Assessment     Row Name 12/07/21 2152       Malnutrition Severity Assessment    Malnutrition Type Chronic Disease - Related Malnutrition    Row Name 12/07/21 2152       Insufficient Energy Intake     Insufficient Energy Intake Findings Moderate    Insufficient Energy Intake  <75% of est. energy requirement for > or equal to 1 month    Row Name 12/07/21 2152       Muscle Loss    Loss of Muscle Mass Findings Moderate    Temple Region None    Clavicle Bone Region None    Acromion Bone Region None     Scapular Bone Region None    Dorsal Hand Region Moderate - slight depression    Patellar Region None    Anterior Thigh Region None    Posterior Calf Region Moderate - some roundness, slight firmness    Row Name 12/07/21 2152       Fat Loss    Subcutaneous Fat Loss Findings Moderate    Orbital Region  None    Upper Arm Region Moderate - some fat tissue, not ample    Thoracic & Lumbar Region None    Row Name 12/07/21 2152       Criteria Met (Must meet criteria for severity in at least 2 of these categories: M Wasting, Fat Loss, Fluid, Secondary Signs, Wt. Status, Intake)    Patient meets criteria for  Moderate (non-severe) Malnutrition                     Electronically signed by:  Cathy Osman RD  12/07/21 14:27 EST

## 2021-12-08 VITALS
SYSTOLIC BLOOD PRESSURE: 110 MMHG | BODY MASS INDEX: 25.43 KG/M2 | DIASTOLIC BLOOD PRESSURE: 73 MMHG | HEIGHT: 61 IN | HEART RATE: 94 BPM | OXYGEN SATURATION: 94 % | RESPIRATION RATE: 16 BRPM | WEIGHT: 134.7 LBS | TEMPERATURE: 99 F

## 2021-12-08 PROBLEM — E44.0 MODERATE MALNUTRITION: Status: ACTIVE | Noted: 2021-12-08

## 2021-12-08 LAB
ALBUMIN SERPL-MCNC: 2.3 G/DL (ref 3.5–5.2)
ANION GAP SERPL CALCULATED.3IONS-SCNC: 5 MMOL/L (ref 5–15)
BACTERIA FLD CULT: NORMAL
BASOPHILS # BLD AUTO: 0.1 10*3/MM3 (ref 0–0.2)
BASOPHILS NFR BLD AUTO: 0.8 % (ref 0–1.5)
BUN SERPL-MCNC: 4 MG/DL (ref 8–23)
BUN/CREAT SERPL: 9.3 (ref 7–25)
CALCIUM SPEC-SCNC: 8.4 MG/DL (ref 8.6–10.5)
CHLORIDE SERPL-SCNC: 101 MMOL/L (ref 98–107)
CO2 SERPL-SCNC: 31 MMOL/L (ref 22–29)
CREAT SERPL-MCNC: 0.43 MG/DL (ref 0.57–1)
DEPRECATED RDW RBC AUTO: 57.8 FL (ref 37–54)
EOSINOPHIL # BLD AUTO: 0.6 10*3/MM3 (ref 0–0.4)
EOSINOPHIL NFR BLD AUTO: 9.7 % (ref 0.3–6.2)
ERYTHROCYTE [DISTWIDTH] IN BLOOD BY AUTOMATED COUNT: 21.5 % (ref 12.3–15.4)
GFR SERPL CREATININE-BSD FRML MDRD: 147 ML/MIN/1.73
GLUCOSE BLDC GLUCOMTR-MCNC: 118 MG/DL (ref 70–105)
GLUCOSE BLDC GLUCOMTR-MCNC: 141 MG/DL (ref 70–105)
GLUCOSE SERPL-MCNC: 135 MG/DL (ref 65–99)
GRAM STN SPEC: NORMAL
GRAM STN SPEC: NORMAL
HCT VFR BLD AUTO: 29.3 % (ref 34–46.6)
HGB BLD-MCNC: 9.4 G/DL (ref 12–15.9)
LYMPHOCYTES # BLD AUTO: 1.4 10*3/MM3 (ref 0.7–3.1)
LYMPHOCYTES NFR BLD AUTO: 20.7 % (ref 19.6–45.3)
MAGNESIUM SERPL-MCNC: 1.8 MG/DL (ref 1.6–2.4)
MCH RBC QN AUTO: 24.8 PG (ref 26.6–33)
MCHC RBC AUTO-ENTMCNC: 32 G/DL (ref 31.5–35.7)
MCV RBC AUTO: 77.5 FL (ref 79–97)
MONOCYTES # BLD AUTO: 0.7 10*3/MM3 (ref 0.1–0.9)
MONOCYTES NFR BLD AUTO: 10.4 % (ref 5–12)
NEUTROPHILS NFR BLD AUTO: 3.8 10*3/MM3 (ref 1.7–7)
NEUTROPHILS NFR BLD AUTO: 58.4 % (ref 42.7–76)
NRBC BLD AUTO-RTO: 0.1 /100 WBC (ref 0–0.2)
PHOSPHATE SERPL-MCNC: 3.3 MG/DL (ref 2.5–4.5)
PLATELET # BLD AUTO: 278 10*3/MM3 (ref 140–450)
PMV BLD AUTO: 7.6 FL (ref 6–12)
POTASSIUM SERPL-SCNC: 4.4 MMOL/L (ref 3.5–5.2)
RBC # BLD AUTO: 3.78 10*6/MM3 (ref 3.77–5.28)
SODIUM SERPL-SCNC: 137 MMOL/L (ref 136–145)
WBC NRBC COR # BLD: 6.5 10*3/MM3 (ref 3.4–10.8)

## 2021-12-08 PROCEDURE — 99239 HOSP IP/OBS DSCHRG MGMT >30: CPT | Performed by: INTERNAL MEDICINE

## 2021-12-08 PROCEDURE — 80069 RENAL FUNCTION PANEL: CPT | Performed by: INTERNAL MEDICINE

## 2021-12-08 PROCEDURE — 82962 GLUCOSE BLOOD TEST: CPT

## 2021-12-08 PROCEDURE — 25010000002 ONDANSETRON PER 1 MG: Performed by: NURSE PRACTITIONER

## 2021-12-08 PROCEDURE — 85025 COMPLETE CBC W/AUTO DIFF WBC: CPT | Performed by: INTERNAL MEDICINE

## 2021-12-08 PROCEDURE — 83735 ASSAY OF MAGNESIUM: CPT | Performed by: INTERNAL MEDICINE

## 2021-12-08 RX ORDER — ONDANSETRON 4 MG/1
4 TABLET, FILM COATED ORAL EVERY 6 HOURS PRN
Qty: 20 TABLET | Refills: 0
Start: 2021-12-08

## 2021-12-08 RX ORDER — HYDROCODONE BITARTRATE AND ACETAMINOPHEN 5; 325 MG/1; MG/1
1 TABLET ORAL EVERY 6 HOURS PRN
Qty: 12 TABLET | Refills: 0
Start: 2021-12-08 | End: 2021-12-11

## 2021-12-08 RX ORDER — ACETAMINOPHEN 325 MG/1
650 TABLET ORAL EVERY 4 HOURS PRN
Start: 2021-12-08

## 2021-12-08 RX ORDER — POTASSIUM CHLORIDE 750 MG/1
10 TABLET, FILM COATED, EXTENDED RELEASE ORAL 2 TIMES DAILY WITH MEALS
Status: DISCONTINUED | OUTPATIENT
Start: 2021-12-08 | End: 2021-12-08 | Stop reason: HOSPADM

## 2021-12-08 RX ORDER — FOLIC ACID 1 MG/1
1 TABLET ORAL DAILY
Start: 2021-12-09 | End: 2021-12-08

## 2021-12-08 RX ORDER — HYDROCODONE BITARTRATE AND ACETAMINOPHEN 5; 325 MG/1; MG/1
1 TABLET ORAL EVERY 6 HOURS PRN
Qty: 12 TABLET | Refills: 0
Start: 2021-12-08 | End: 2021-12-08

## 2021-12-08 RX ORDER — ONDANSETRON 4 MG/1
4 TABLET, FILM COATED ORAL EVERY 6 HOURS PRN
Qty: 20 TABLET | Refills: 0
Start: 2021-12-08 | End: 2021-12-08

## 2021-12-08 RX ORDER — FLUCONAZOLE 100 MG/1
100 TABLET ORAL EVERY 24 HOURS
Qty: 6 TABLET | Refills: 0 | Status: SHIPPED | OUTPATIENT
Start: 2021-12-08 | End: 2021-12-14

## 2021-12-08 RX ORDER — FOLIC ACID 1 MG/1
1 TABLET ORAL DAILY
Qty: 30 TABLET | Refills: 0 | Status: SHIPPED | OUTPATIENT
Start: 2021-12-09

## 2021-12-08 RX ORDER — METRONIDAZOLE 500 MG/1
500 TABLET ORAL EVERY 8 HOURS SCHEDULED
Qty: 72 TABLET | Refills: 0 | Status: SHIPPED | OUTPATIENT
Start: 2021-12-08 | End: 2022-01-01

## 2021-12-08 RX ORDER — FLUCONAZOLE 100 MG/1
100 TABLET ORAL EVERY 24 HOURS
Qty: 6 TABLET | Refills: 0
Start: 2021-12-08 | End: 2021-12-08

## 2021-12-08 RX ADMIN — FOLIC ACID 1 MG: 1 TABLET ORAL at 09:15

## 2021-12-08 RX ADMIN — METRONIDAZOLE 500 MG: 500 TABLET ORAL at 05:28

## 2021-12-08 RX ADMIN — ONDANSETRON 4 MG: 2 INJECTION INTRAMUSCULAR; INTRAVENOUS at 12:37

## 2021-12-08 RX ADMIN — CALCIUM ACETATE MONOHYDRATE AND ALUMINUM SULFATE TETRADECAHYDRATE 1000 ML: 952; 1347 POWDER, FOR SOLUTION TOPICAL at 05:28

## 2021-12-08 RX ADMIN — CALCIUM ACETATE MONOHYDRATE AND ALUMINUM SULFATE TETRADECAHYDRATE 1000 ML: 952; 1347 POWDER, FOR SOLUTION TOPICAL at 15:19

## 2021-12-08 RX ADMIN — METRONIDAZOLE 500 MG: 500 TABLET ORAL at 15:18

## 2021-12-08 RX ADMIN — SODIUM CHLORIDE, PRESERVATIVE FREE 10 ML: 5 INJECTION INTRAVENOUS at 09:15

## 2021-12-08 RX ADMIN — FLUCONAZOLE 100 MG: 100 TABLET ORAL at 15:18

## 2021-12-08 RX ADMIN — HYDROCODONE BITARTRATE AND ACETAMINOPHEN 1 TABLET: 10; 325 TABLET ORAL at 12:37

## 2021-12-08 NOTE — CASE MANAGEMENT/SOCIAL WORK
Continued Stay Note   [facility]     Patient Name: Carmella Pelayo  MRN: [id number]  Today's Date: 12/8/2021    Admit Date: 12/4/2021     Discharge Plan     Row Name 12/08/21 1119       Plan    Plan [facility], accepted, bed available 12/8. No PASRR or precert required.    Plan Comments Received notice from [facility] that patient is accepted to subacute and they have a bed available today and will call the nurse when ready for ready. Updated patient at bedside, she remains agreeable. Updated MD via secure chat, and requested to send medications to updated pharmacy. Updated RN.    Final Discharge Disposition Code 03 - skilled nursing facility (SNF)    Final Note [facility]              Met with patient in room wearing PPE: mask    Maintained distance greater than six feet and spent less than 15 minutes in the room.          Jaleesa Leone RN

## 2021-12-08 NOTE — PLAN OF CARE
Goal Outcome Evaluation:  Plan of Care Reviewed With: patient           Outcome Summary: Pt has slept well throughout the night, c/o L hip/leg pain gave PRN Norco and has tolerated well. Pt continues to use bedpain to void, d/t weakness and KATIE drains. Oncology, nephrology and ID currently following, will continue to monitor.

## 2021-12-08 NOTE — PROGRESS NOTES
"[facility] PROGRESS NOTE     LOS: 4 days    Patient Care Team:  Deana Wallace MD as PCP - General (Family Medicine)      Subjective     Patient resting comfortably  Denied any abdominal pain, nausea, vomiting or diarrhea    Objective     Vital Sign Min/Max for last 24 hours  Temp:  [98.1 °F (36.7 °C)-99 °F (37.2 °C)] 99 °F (37.2 °C)  Heart Rate:  [80-94] 94  Resp:  [16] 16  BP: (110-133)/(72-77) 110/73                       Flowsheet Rows      First Filed Value   Admission Height 154.9 cm (61\") Documented at 12/04/2021 0336   Admission Weight 55 kg (121 lb 4.1 oz) Documented at 12/04/2021 0336          I/O this shift:  In: 240 [P.O.:240]  Out: -   I/O last 3 completed shifts:  In: 840 [P.O.:840]  Out: 1015 [Urine:950; Drains:65]    Physical Exam:  Physical Exam    General Appearance: alert, oriented x 3, no acute distress   Skin: warm and dry  HEENT: oral mucosa normal, nonicteric sclera  Neck: supple, no JVD  Lungs: CTA  Heart: RRR, normal S1 and S2  Abdomen: soft, nontender, nondistended.  Drain+ LLQ  : no palpable bladder  Extremities: no edema, cyanosis or clubbing  Neuro: normal speech and mental status        LABS:  Lab Results   Component Value Date    CALCIUM 8.4 (L) 12/08/2021    PHOS 3.3 12/08/2021     Results from last 7 days   Lab Units 12/08/21  0535 12/07/21  0421 12/07/21  0420 12/07/21  0420 12/06/21  1934 12/06/21  0514 12/06/21  0514 12/05/21  1116 12/05/21  0347 12/04/21  1335 12/04/21  0422   MAGNESIUM mg/dL 1.8  --   --  1.6  --   --   --   --  2.2  --  1.3*   SODIUM mmol/L 137  --   --  138  --   --  141  --  140   < > 138   POTASSIUM mmol/L 4.4  --   --  4.3 3.0*   < > 2.9*   < > 2.1*   < > 2.3*   CHLORIDE mmol/L 101  --   --  103  --   --  102  --  98   < > 94*   CO2 mmol/L 31.0*  --   --  29.0  --   --  30.0*  --  32.0*   < > 31.0*   BUN mg/dL 4*  --   --  3*  --   --  3*  --  5*   < > 5*   CREATININE mg/dL 0.43*  --   --  0.39*  --   --  0.35*  --  0.46*   < > 0.43*   GLUCOSE " mg/dL 135*  --    < > 107*  --    < > 95  --  100*   < > 154*   CALCIUM mg/dL 8.4*  --   --  8.1*  --   --  7.9*  --  7.8*   < > 8.6   WBC 10*3/mm3 6.50 6.80  --   --   --   --  7.50  --  7.60  --  8.60   HEMOGLOBIN g/dL 9.4* 9.0*  --   --   --   --  9.0*  --  8.6*  --  9.7*   PLATELETS 10*3/mm3 278 258  --   --   --   --  258  --  262  --  312   ALT (SGPT) U/L  --   --   --  5  --   --  <5  --   --   --  8   AST (SGOT) U/L  --   --   --  9  --   --  10  --   --   --  7    < > = values in this interval not displayed.     No results found for: CKTOTAL, CKMB, CKMBINDEX, TROPONINI, TROPONINT  Estimated Creatinine Clearance: 58 mL/min (A) (by C-G formula based on SCr of 0.43 mg/dL (L)).      Brief Urine Lab Results  (Last result in the past 365 days)      Color   Clarity   Blood   Leuk Est   Nitrite   Protein   CREAT   Urine HCG        12/04/21 0548 Yellow   Clear   Small (1+)   Small (1+)   Negative   Negative               WEIGHTS:     Wt Readings from Last 1 Encounters:   12/08/21 0616 61.1 kg (134 lb 11.2 oz)   12/07/21 0518 59.6 kg (131 lb 5.3 oz)   12/06/21 0525 59.2 kg (130 lb 8.2 oz)   12/05/21 0400 57.1 kg (125 lb 14.4 oz)   12/04/21 0336 55 kg (121 lb 4.1 oz)       aluminum sulfate-calcium acetate 1:20, 1,000 mL, Topical, Q8H  cefTRIAXone, 2 g, Intravenous, Q24H  enoxaparin, 40 mg, Subcutaneous, Q24H  fluconazole, 100 mg, Oral, Q24H  folic acid, 1 mg, Oral, Daily  insulin lispro, 0-7 Units, Subcutaneous, TID With Meals  metroNIDAZOLE, 500 mg, Oral, Q8H  potassium chloride, 20 mEq, Oral, BID With Meals  sodium chloride, 10 mL, Intravenous, Q12H           Assessment/Plan       1.  Hypokalemia  Higher transtubular potassium gradient in the presence of hypokalemia suggestive of potassium losing nephropathy  Potassium level has improved.  Continue oral potassium replacement  May consider low-dose amiloride in future    2.  Intra-abdominal abscess  S/p drain placement by IR  On IV antibiotics per infectious disease  following      Odin Cazares MD  12/08/21  09:58 EST

## 2021-12-08 NOTE — PROGRESS NOTES
Infectious Diseases Progress Note      LOS: 4 days   Patient Care Team:  Deana Wallace MD as PCP - General (Family Medicine)    Chief Complaint: Weakness    Subjective     The patient had no fever during the last 24 hours. She remained hemodynamically stable.  Patient is currently on room air and does not have any new complaints other than occasional nausea    Review of Systems:   Review of Systems   Constitutional: Negative.    HENT: Negative.    Eyes: Negative.    Respiratory: Negative.    Cardiovascular: Negative.    Gastrointestinal: Positive for nausea.   Genitourinary: Negative.    Musculoskeletal: Negative.    Skin: Negative.    Neurological: Negative.    Hematological: Negative.    Psychiatric/Behavioral: Negative.         Objective     Vital Signs  Temp:  [98.2 °F (36.8 °C)-99 °F (37.2 °C)] 99 °F (37.2 °C)  Heart Rate:  [80-94] 94  Resp:  [16] 16  BP: (110-133)/(72-77) 110/73    Physical Exam:  Physical Exam  Vitals and nursing note reviewed.   Constitutional:       Appearance: She is well-developed.   HENT:      Head: Normocephalic and atraumatic.   Eyes:      Pupils: Pupils are equal, round, and reactive to light.   Cardiovascular:      Rate and Rhythm: Normal rate and regular rhythm.      Heart sounds: Normal heart sounds.   Pulmonary:      Effort: Pulmonary effort is normal. No respiratory distress.      Breath sounds: Normal breath sounds. No wheezing or rales.   Abdominal:      General: Bowel sounds are normal. There is no distension.      Palpations: Abdomen is soft. There is no mass.      Tenderness: There is abdominal tenderness. There is no guarding or rebound.      Comments: 2 KATIE drains in place with purulent drainage   Genitourinary:     Comments: Chronic perirectal wounds  Musculoskeletal:         General: No deformity. Normal range of motion.      Cervical back: Normal range of motion and neck supple.   Skin:     General: Skin is warm.      Findings: No erythema or rash.   Neurological:       Mental Status: She is alert and oriented to person, place, and time.      Cranial Nerves: No cranial nerve deficit.          Results Review:    I have reviewed all clinical data, test, lab, and imaging results.     Radiology  No Radiology Exams Resulted Within Past 24 Hours    Cardiology    Laboratory    Results from last 7 days   Lab Units 12/08/21  0535 12/07/21  0421 12/06/21  0514 12/05/21  0347 12/04/21  0422   WBC 10*3/mm3 6.50 6.80 7.50 7.60 8.60   HEMOGLOBIN g/dL 9.4* 9.0* 9.0* 8.6* 9.7*   HEMATOCRIT % 29.3* 28.0* 28.4* 26.7* 29.7*   PLATELETS 10*3/mm3 278 258 258 262 312     Results from last 7 days   Lab Units 12/08/21  0535 12/07/21  0420 12/06/21  1934 12/06/21  0514 12/05/21  2208 12/05/21  1116 12/05/21  0347 12/04/21  1335 12/04/21  1335 12/04/21  0422 12/04/21  0422   SODIUM mmol/L 137 138  --  141  --   --  140  --  141  --  138   POTASSIUM mmol/L 4.4 4.3 3.0* 2.9* 3.2* 3.1* 2.1*   < > 2.5*   < > 2.3*   CHLORIDE mmol/L 101 103  --  102  --   --  98  --  99  --  94*   CO2 mmol/L 31.0* 29.0  --  30.0*  --   --  32.0*  --  31.0*  --  31.0*   BUN mg/dL 4* 3*  --  3*  --   --  5*  --  5*  --  5*   CREATININE mg/dL 0.43* 0.39*  --  0.35*  --   --  0.46*  --  0.38*  --  0.43*   GLUCOSE mg/dL 135* 107*  --  95  --   --  100*  --  140*  --  154*   ALBUMIN g/dL 2.30* 2.30*  --  2.30*  --   --   --   --   --   --  2.70*   BILIRUBIN mg/dL  --  0.3  --  0.2  --   --   --   --   --   --  0.6   ALK PHOS U/L  --  225*  --  252*  --   --   --   --   --   --  353*   AST (SGOT) U/L  --  9  --  10  --   --   --   --   --   --  7   ALT (SGPT) U/L  --  5  --  <5  --   --   --   --   --   --  8   LIPASE U/L  --   --   --   --   --   --   --   --   --   --  13   CALCIUM mg/dL 8.4* 8.1*  --  7.9*  --   --  7.8*  --  7.9*  --  8.6    < > = values in this interval not displayed.                 Microbiology   Microbiology Results (last 10 days)     Procedure Component Value - Date/Time    Body Fluid Culture - Body Fluid,  Abdominal Cavity [[id number]] Collected: 12/07/21 0956    Lab Status: Preliminary result Specimen: Body Fluid from Abdominal Cavity Updated: 12/08/21 1038     Body Fluid Culture No growth     Gram Stain Rare (1+) WBCs per low power field      No organisms seen    Blood Culture - Blood, Arm, Left [[id number]]  (Normal) Collected: 12/04/21 0931    Lab Status: Preliminary result Specimen: Blood from Arm, Left Updated: 12/08/21 1000     Blood Culture No growth at 4 days    Blood Culture - Blood, Arm, Right [[id number]]  (Normal) Collected: 12/04/21 0928    Lab Status: Preliminary result Specimen: Blood from Arm, Right Updated: 12/08/21 1115     Blood Culture No growth at 4 days    COVID PRE-OP / PRE-PROCEDURE SCREENING ORDER (NO ISOLATION) - Swab, Nasopharynx [[id number]]  (Normal) Collected: 12/04/21 0719    Lab Status: Final result Specimen: Swab from Nasopharynx Updated: 12/04/21 0814    Narrative:      The following orders were created for panel order COVID PRE-OP / PRE-PROCEDURE SCREENING ORDER (NO ISOLATION) - Swab, Nasopharynx.  Procedure                               Abnormality         Status                     ---------                               -----------         ------                     COVID-19,CEPHEID/ISELA/BD...[[id number]]  Normal              Final result                 Please view results for these tests on the individual orders.    COVID-19,CEPHEID/ISELA/BDMAX,COR/TUNDE/PAD/KIMO IN-HOUSE(OR EMERGENT/ADD-ON),NP SWAB IN TRANSPORT MEDIA 3-4 HR TAT, RT-PCR - Swab, Nasopharynx [[id number]]  (Normal) Collected: 12/04/21 0719    Lab Status: Final result Specimen: Swab from Nasopharynx Updated: 12/04/21 0814     COVID19 Not Detected    Narrative:      Fact sheet for providers: https://www.fda.gov/media/576390/download     Fact sheet for patients: https://www.fda.gov/media/950043/download  Fact sheet for providers: https://www.fda.gov/media/490876/download     Fact sheet for patients:  https://www.fda.gov/media/835316/download    Urine Culture - Urine, Urine, Clean Catch [[id number]]  (Abnormal) Collected: 12/04/21 0548    Lab Status: Final result Specimen: Urine, Clean Catch Updated: 12/05/21 1323     Urine Culture Yeast isolated     Comment: No further workup.           <25,000 CFU/mL Mixed Juwan Isolated    Narrative:      Specimen contains mixed organisms of questionable pathogenicity which indicates contamination with commensal juwan.  Further identification is unlikely to provide clinically useful information.  Suggest recollection.    Body Fluid Culture - Body Fluid, Peritoneum [[id number]] Collected: 12/03/21 1214    Lab Status: Final result Specimen: Body Fluid from Peritoneum Updated: 12/08/21 0641     Body Fluid Culture No growth at 5 days     Gram Stain Moderate (3+) WBCs per low power field      No organisms seen          Medication Review:       Schedule Meds  aluminum sulfate-calcium acetate 1:20, 1,000 mL, Topical, Q8H  cefTRIAXone, 2 g, Intravenous, Q24H  enoxaparin, 40 mg, Subcutaneous, Q24H  fluconazole, 100 mg, Oral, Q24H  folic acid, 1 mg, Oral, Daily  insulin lispro, 0-7 Units, Subcutaneous, TID With Meals  metroNIDAZOLE, 500 mg, Oral, Q8H  potassium chloride, 10 mEq, Oral, BID With Meals  sodium chloride, 10 mL, Intravenous, Q12H        Infusion Meds       PRN Meds  •  acetaminophen **OR** acetaminophen **OR** acetaminophen  •  aluminum-magnesium hydroxide-simethicone  •  Calcium Gluconate-NaCl **AND** calcium gluconate **AND** Calcium, Ionized  •  dextrose  •  dextrose  •  glucagon (human recombinant)  •  HYDROcodone-acetaminophen  •  HYDROcodone-acetaminophen  •  insulin lispro **AND** insulin lispro  •  magnesium sulfate **OR** magnesium sulfate **OR** magnesium sulfate  •  melatonin  •  ondansetron **OR** ondansetron  •  potassium & sodium phosphates **OR** potassium & sodium phosphates  •  potassium chloride **OR** potassium chloride **OR** potassium chloride  •   [COMPLETED] Insert peripheral IV **AND** sodium chloride  •  sodium chloride        Assessment/Plan       Antimicrobial Therapy   1. IV ceftriaxone        2. P.o. metronidazole        3.  P.o. Diflucan        4.        5.                Assessment     Recurrent abdominal abscesses.  The current CT the abdomen is shows the original normal abscess was significantly decreased but there are no new fluid collections along the  left lower quadrant and along the left peritoneal wall that do not communicate with the drain.  There is some concern for possible retrocutaneous fistula.  There is a new fluid collection posterior to the greater trochanter could be bursitis versus abscess.    -Patient has continued lower abdominal pain  -S/p IR placement of new drain tube on December 6, 2021     Concern for possible new finding of osteomyelitis versus metastatic disease  CT also showed some changes in the left iliac vein and  left ischium concerning for possible osteomyelitis.    -Patient complains about recent pain to her left hip and left leg with some subsequent swelling     Recent history of large left lower quadrant abscess.    Abscess was drained on 11/18/2021 and 400 mL of purulent drainage was removed.  Cultures are showing heavy growth of group B streptococcus.  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  However patient does have some chronic perirectal wounds  -Patient completed 2 weeks of IV Rocephin and p.o. 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   Fairly significant denuded areas with some drainage.  -Wound cultures from 11/20/2021 also grew group B streptococcus     Type 2 diabetes-uncontrolled patient's A1c is 10     History of vulvar cancer with a vulvectomy and radiation and chemotherapy treatments approximately 2 years ago.  Patient has had no recent treatments and-she reports that the cancer has  returned and she cannot tolerate chemotherapy  -Patient was hospice but is currently not with that service and since she is getting treatment for infections  -Patient still has a port    Candiduria-patient does not have a Moore catheter in        Plan     Continue IV Rocephin 2 g every 24 for 4 weeks  Continue p.o. Flagyl 500 mg 3 times daily for 4 weeks  Continue p.o. Diflucan 100 mg daily for 7 days  Patient has a port  Weekly labs getting CBC, creatinine, sed rate x4 weeks  Continue supportive care  Long-term prognosis is guarded  Case discussed with RN and hospitalist  Okay to discharge from Infectious Disease standpoint    Please fax all post discharge lab results, imaging studies and correspondence to this fax number [phone]  For any question or concern please contact our service number [phone]    Aleyda Pinto, MANISH  12/08/21  15:54 EST    Note is dictated utilizing voice recognition software/Dragon

## 2021-12-08 NOTE — NURSING NOTE
Gave report to Carly at [facility], they will pick patient up at 4pm.  Called her daughter Nicolasa she works there and advised her of situation she was aware.  Sending with [facility] for IV antibiotics.

## 2021-12-08 NOTE — DISCHARGE SUMMARY
[facility]  DISCHARGE SUMMARY    Patient Name: Carmella Pelayo  : 1955  MRN: [id number]    Date of Admission: 2021  Date of Discharge: 2020  Primary Care Physician: Deana Wallace MD      Presenting Problem:   Hypokalemia [E87.6]  Intra-abdominal abscess (HCC) [K65.1]  Abscess of left hip [L02.416]  Osteomyelitis of pelvis (HCC) [M86.9]    Active and Resolved Hospital Problems:  Active Hospital Problems    Diagnosis POA   • **Abscess of left hip [L02.416] Yes   • Moderate malnutrition (CMS/HCC) [E44.0] Yes   • Intra-abdominal abscess (HCC) [K65.1] Yes   • Osteomyelitis of left side of pelvis (HCC) [M86.9] Yes   • Left hip pain [M25.552] Yes   • Hypokalemia [E87.6] Yes   • Hypomagnesemia [E83.42] Yes   • Malignant neoplasm of vulva (HCC) [C51.9] Yes   • Left lower quadrant abdominal pain [R10.32] Yes   • Anemia [D64.9] Yes   • Type 2 diabetes mellitus (HCC) [E11.9] Yes      Resolved Hospital Problems   No resolved problems to display.         Hospital Course     Hospital Course:  Carmella Pelayo is a 66 y.o. female with past medical history of vulvar cancer, abdominal abscesses, and DM2 who presented to [facility] on 2021 with left lower abdominal, left hip and leg pain accompanied with edema to left leg. In the ED patient had CT abdomen that showed evolving left peritoneal/retroperitoneal abscess and soft tissue phlegmon.  Surgical drain is present.  Larger components of this abscess collection appear significantly decreased compared to prior.  Small irregular lobulations in the left lower quadrant and along the left peritoneal wall persist and are not included communication with the drain.  Redemonstrated soft tissue defect and skin thickening in the perineum compatible with known vulvar cancer.  There is enhancing heterogeneous fluid tracking extending from the anterior wall of the lower rectum to the perineum which  could reflect a rectocutaneous fistula.  New enhancing crescentic fluid collection posterior to the greater trochanter which could reflect bursitis versus abscess.  Permeative changes in the left iliac wing and left ischium concerning for active bony destruction.  Given adjacent soft tissue infection, findings are concerning for osteomyelitis in this location.  Patient was started on IV antibiotics with ID and general surgery consulted.  Ortho also consulted for possible left hip abscess/osteomyelitis, recommending IR drainage and no surgical work-up.  Patient underwent drain placement with IR on 12/5 with improvement in symptoms.  Cultures remain negative so far from the drain and blood cultures.  Patient continued on IV Rocephin and p.o. Flagyl to finish up total of 4 more weeks after she had finished initial 2 weeks as outpatient.  Oncology also consulted for the recurrent vulvar cancer, recommending further work-up and treatment once infection resolves.  PT/OT recommending rehab placement.  She did have some hypokalemia and hypomagnesemia, replaced and nephrology followed the patient.  Patient has been cleared to discharge per ID and general surgery.  Patient is stable to discharge to rehab today with follow-up with PCP, oncology, and general surgery as an outpatient.    A/P:  LLQ abdominal abscesses  -CT findings noted  -s/p IR Drain placement 11/18/2021 and on 12/5  -fluid culture collected from perc drain 12/3/2021 at Dr. Carpenter's office  -pt was discharged on 11/21/2021 to rehab to complete 2 weeks of IV rocephin and oral flagyl after her fluid culture grew group B streptococcus. She reported she did not have her last day of IV rocephin 12/3 but did complete the oral flagyl.   -Continue IV Rocephin and Flagyl per ID for total of 4 more weeks  -Repeat cultures here negative so far  -ID and Gen. Sx following, okay with discharge today     Left hip pain, possible OM vs metastatic disease  -Noted on CT  -Ortho  signed off, recommending antibiotics and IR drainage  -Management as above     Recurrent vulvar cancer  -s/p hemivulvectomy and radiation with chemo 2 years ago  -CT findings noted  -Patient was previously on hospice but discharged and is not on any treatment currently  -Oncology following, continue outpatient follow-up     Chronic perirectal wounds from previous vulvectomy surgery in 2019 along with moisture associated dermatitis.  Wound cultures from 11/20/21 grew group B streptococcus.     Hypokalemia  Hypomagnesemia   -Replace and monitor  -Nephrology following     DM2  -Monitor blood glucose, continue sliding scale  -Adjust as needed    DISCHARGE Follow Up Recommendations for labs and diagnostics: Follow-up with PCP, oncology, and general surgery      Reasons For Change In Medications and Indications for New Medications:  Medication changes as below.    Day of Discharge     Vital Signs:  Temp:  [98.1 °F (36.7 °C)-99 °F (37.2 °C)] 99 °F (37.2 °C)  Heart Rate:  [80-94] 94  Resp:  [16] 16  BP: (110-133)/(72-77) 110/73    Physical Exam:  General: Awake, alert, elderly female, lying in bed, NAD  Eyes: PERRL, EOMI, conjunctive are clear  Cardiovascular: Regular rate and rhythm, no murmurs  Respiratory: Clear to auscultation bilaterally, no wheezing or rales, unlabored breathing  Abdomen: Soft, nontender, left quadrant drain same place, positive bowel sounds, no guarding  Neurologic: A&O, CN grossly intact, moves all extremities spontaneously  Musculoskeletal: Normal range of motion, no deformities  Skin: Warm, dry, intact        Pertinent  and/or Most Recent Results     LAB RESULTS:      Lab 12/08/21  0535 12/07/21  0421 12/06/21  0514 12/05/21  0347 12/04/21  0422   WBC 6.50 6.80 7.50 7.60 8.60   HEMOGLOBIN 9.4* 9.0* 9.0* 8.6* 9.7*   HEMATOCRIT 29.3* 28.0* 28.4* 26.7* 29.7*   PLATELETS 278 258 258 262 312   NEUTROS ABS 3.80 4.10 5.30 5.20 6.30   LYMPHS ABS 1.40 1.30 1.00 1.30 1.20   MONOS ABS 0.70 0.70 0.70 0.80  0.80   EOS ABS 0.60* 0.60* 0.50* 0.40 0.20   MCV 77.5* 78.0* 78.3* 77.0* 76.7*   CRP  --   --   --   --  6.83*   PROCALCITONIN  --   --   --   --  0.12   PROTIME  --   --  11.9*  --   --    APTT  --   --  28.1  --   --          Lab 12/08/21  0535 12/07/21 0420 12/06/21  1934 12/06/21  0514 12/05/21  2208 12/05/21  1116 12/05/21  0347 12/04/21  1335 12/04/21  1335 12/04/21 0422 12/04/21 0422   SODIUM 137 138  --  141  --   --  140  --  141   < > 138   POTASSIUM 4.4 4.3 3.0* 2.9* 3.2*   < > 2.1*   < > 2.5*   < > 2.3*   CHLORIDE 101 103  --  102  --   --  98  --  99   < > 94*   CO2 31.0* 29.0  --  30.0*  --   --  32.0*  --  31.0*   < > 31.0*   ANION GAP 5.0 6.0  --  9.0  --   --  10.0  --  11.0   < > 13.0   BUN 4* 3*  --  3*  --   --  5*  --  5*   < > 5*   CREATININE 0.43* 0.39*  --  0.35*  --   --  0.46*  --  0.38*   < > 0.43*   GLUCOSE 135* 107*  --  95  --   --  100*  --  140*   < > 154*   CALCIUM 8.4* 8.1*  --  7.9*  --   --  7.8*  --  7.9*   < > 8.6   MAGNESIUM 1.8 1.6  --   --   --   --  2.2  --   --   --  1.3*   PHOSPHORUS 3.3  --   --   --   --   --   --   --   --   --   --     < > = values in this interval not displayed.         Lab 12/08/21 0535 12/07/21 0420 12/06/21  0514 12/04/21  0422   TOTAL PROTEIN  --  6.5 6.7 7.2   ALBUMIN 2.30* 2.30* 2.30* 2.70*   GLOBULIN  --  4.2 4.4 4.5   ALT (SGPT)  --  5 <5 8   AST (SGOT)  --  9 10 7   BILIRUBIN  --  0.3 0.2 0.6   ALK PHOS  --  225* 252* 353*   LIPASE  --   --   --  13         Lab 12/06/21  0514   PROTIME 11.9*   INR 1.08                 Brief Urine Lab Results  (Last result in the past 365 days)      Color   Clarity   Blood   Leuk Est   Nitrite   Protein   CREAT   Urine HCG        12/04/21 0548 Yellow   Clear   Small (1+)   Small (1+)   Negative   Negative               Microbiology Results (last 10 days)     Procedure Component Value - Date/Time    Body Fluid Culture - Body Fluid, Abdominal Cavity [[id number]] Collected: 12/07/21 0956    Lab Status:  Preliminary result Specimen: Body Fluid from Abdominal Cavity Updated: 12/08/21 1038     Body Fluid Culture No growth     Gram Stain Rare (1+) WBCs per low power field      No organisms seen    Blood Culture - Blood, Arm, Left [[id number]]  (Normal) Collected: 12/04/21 0931    Lab Status: Preliminary result Specimen: Blood from Arm, Left Updated: 12/08/21 1000     Blood Culture No growth at 4 days    Blood Culture - Blood, Arm, Right [[id number]]  (Normal) Collected: 12/04/21 0928    Lab Status: Preliminary result Specimen: Blood from Arm, Right Updated: 12/07/21 1115     Blood Culture No growth at 3 days    COVID PRE-OP / PRE-PROCEDURE SCREENING ORDER (NO ISOLATION) - Swab, Nasopharynx [[id number]]  (Normal) Collected: 12/04/21 0719    Lab Status: Final result Specimen: Swab from Nasopharynx Updated: 12/04/21 0814    Narrative:      The following orders were created for panel order COVID PRE-OP / PRE-PROCEDURE SCREENING ORDER (NO ISOLATION) - Swab, Nasopharynx.  Procedure                               Abnormality         Status                     ---------                               -----------         ------                     COVID-19,CEPHEID/ISELA/BD...[[id number]]  Normal              Final result                 Please view results for these tests on the individual orders.    COVID-19,CEPHEID/ISELA/BDMAX,COR/TUNDE/PAD/KIMO IN-HOUSE(OR EMERGENT/ADD-ON),NP SWAB IN TRANSPORT MEDIA 3-4 HR TAT, RT-PCR - Swab, Nasopharynx [[id number]]  (Normal) Collected: 12/04/21 0719    Lab Status: Final result Specimen: Swab from Nasopharynx Updated: 12/04/21 0814     COVID19 Not Detected    Narrative:      Fact sheet for providers: https://www.fda.gov/media/031664/download     Fact sheet for patients: https://www.fda.gov/media/129491/download  Fact sheet for providers: https://www.fda.gov/media/006938/download     Fact sheet for patients: https://www.fda.gov/media/517683/download    Urine Culture - Urine, Urine, Clean Catch  [[id number]]  (Abnormal) Collected: 12/04/21 0548    Lab Status: Final result Specimen: Urine, Clean Catch Updated: 12/05/21 1323     Urine Culture Yeast isolated     Comment: No further workup.           <25,000 CFU/mL Mixed Juwan Isolated    Narrative:      Specimen contains mixed organisms of questionable pathogenicity which indicates contamination with commensal juwan.  Further identification is unlikely to provide clinically useful information.  Suggest recollection.    Body Fluid Culture - Body Fluid, Peritoneum [[id number]] Collected: 12/03/21 1214    Lab Status: Final result Specimen: Body Fluid from Peritoneum Updated: 12/08/21 0641     Body Fluid Culture No growth at 5 days     Gram Stain Moderate (3+) WBCs per low power field      No organisms seen          CT Chest With Contrast Diagnostic    Result Date: 11/20/2021  Impression: Probable partially calcified remnants of prior granulomatous disease in the anterior left upper lobe with no convincing evidence of malignant/metastatic disease in the chest. No acute intrathoracic abnormality.  Electronically Signed By-Anson Garber MD On:11/20/2021 8:10 PM This report was finalized on [id number] by  Anson Garber MD.    CT Abdomen Pelvis With Contrast    Result Date: 12/4/2021  Impression: Impression: 1. Evolving left peritoneal/retroperitoneal abscess and soft tissue phlegmon. A surgical drain is present. The larger components of this abscess collection appear significantly decreased compared to prior. Some small irregular lobulations in the left lower quadrant and along the left peritoneal wall persist and are not included communication with the drain. 2. Redemonstrated soft tissue defect and skin thickening in the perineum compatible with known vulvar cancer. 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 3. New enhancing crescentic fluid  collection posterior to the greater trochanter which could reflect bursitis versus abscess. 4. Permeative changes in the left iliac wing and left ischium concerning for active bony destruction. Given adjacent soft tissue infection, findings are concerning for osteomyelitis in this location. Electronically signed by:  Aman Martínez M.D.  12/4/2021 4:48 AM    CT Abdomen Pelvis With Contrast    Result Date: 11/18/2021  Impression: 1.Soft tissue thickening with ulceration involving peritoneum, which could represent known valvular cancer. Recommend correlation with direct inspection. 2.Large 25 cm lobulated rim-enhancing collection along left abdomen and pelvis as described above, most concerning for an abscess. This would be amenable to percutaneous drainage. 3.Trace pelvic free fluid. 4.Hepatic steatosis.  Electronically Signed By-Marvin Medina MD On:11/18/2021 1:45 PM This report was finalized on [id number] by  Marvin Median MD.    CT Guided Abscess Drain Peritoneal    Result Date: 12/6/2021  Impression: IMPRESSION : Placement of an additional 8 Angolan pigtail drain catheter for drainage of abscess within the left lateral abdominal musculature, as described above.  Electronically Signed By-Shane Andrade MD On:12/6/2021 10:58 AM This report was finalized on [id number] by  Shane Andrade MD.    CT Guided Abscess Drain Peritoneal    Result Date: 11/18/2021  Impression: IMPRESSION : CT-guided abscess drain catheter for drainage of large left abdominal pelvic abscess collection, as described above.  Electronically Signed By-Shane Andrade MD On:11/18/2021 4:58 PM This report was finalized on [id number] by  Shane Andrade MD.                  Labs Pending at Discharge:  Pending Labs     Order Current Status    Blood Culture - Blood, Arm, Left Preliminary result    Blood Culture - Blood, Arm, Right Preliminary result    Body Fluid Culture - Body Fluid, Abdominal Cavity Preliminary result          Procedures  Performed           Consults:   Consults     Date and Time Order Name Status Description    12/5/2021 12:45 PM Hematology & Oncology Inpatient Consult Completed     12/5/2021 10:24 AM Inpatient Nephrology Consult Completed     12/4/2021  9:08 AM Inpatient General Surgery Consult Completed     12/4/2021  7:52 AM Inpatient Orthopedic Surgery Consult      12/4/2021  7:32 AM Inpatient Infectious Diseases Consult Completed     12/4/2021  7:04 AM Hospitalist (on-call MD unless specified)      11/19/2021  2:40 PM Inpatient Infectious Diseases Consult Completed     11/18/2021  2:02 PM Surgery (on-call MD unless specified) Completed             Discharge Details        Discharge Medications      New Medications      Instructions Start Date   acetaminophen 325 MG tablet  Commonly known as: TYLENOL   650 mg, Oral, Every 4 Hours PRN      cefTRIAXone 2 g in sodium chloride 0.9 % 100 mL IVPB   2 g, Intravenous, Every 24 Hours      fluconazole 100 MG tablet  Commonly known as: DIFLUCAN   100 mg, Oral, Every 24 Hours      folic acid 1 MG tablet  Commonly known as: FOLVITE   1 mg, Oral, Daily   Start Date: December 9, 2021     HYDROcodone-acetaminophen 5-325 MG per tablet  Commonly known as: NORCO   1 tablet, Oral, Every 6 Hours PRN      ondansetron 4 MG tablet  Commonly known as: ZOFRAN   4 mg, Oral, Every 6 Hours PRN         Changes to Medications      Instructions Start Date   metroNIDAZOLE 500 MG tablet  Commonly known as: FLAGYL  What changed: Another medication with the same name was added. Make sure you understand how and when to take each.   500 mg, Oral, Every 8 Hours Scheduled      metroNIDAZOLE 500 MG tablet  Commonly known as: FLAGYL  What changed: You were already taking a medication with the same name, and this prescription was added. Make sure you understand how and when to take each.   500 mg, Oral, Every 8 Hours Scheduled             Allergies   Allergen Reactions   • Meperidine Palpitations, Anxiety and Nausea And  "Vomiting     Other reaction(s): \"feel like I am going to pass out\", Feel like I am going to have panic attack, Irregular heart beat, Nausea     • Morphine Nausea And Vomiting, Anxiety and Palpitations         Discharge Disposition:  Rehab Facility or Unit (DC - External)    Diet:  Hospital:  Diet Order   Procedures   • Diet Diabetic/Consistent Carbs; Diabetic - Consistent Carb         Discharge Activity:         CODE STATUS:  Code Status and Medical Interventions:   Ordered at: 12/04/21 0858     Level Of Support Discussed With:    Patient     Code Status (Patient has no pulse and is not breathing):    CPR (Attempt to Resuscitate)     Medical Interventions (Patient has pulse or is breathing):    Full Support         Future Appointments   Date Time Provider Department Center   12/10/2021  1:30 PM Jordana Carpenter MD [facility] None       Additional Instructions for the Follow-ups that You Need to Schedule     Ambulatory Referral to Home Health   As directed      Face to Face Visit Date: 12/6/2021    Follow-up provider for Plan of Care?: I treated the patient in an acute care facility and will not continue treatment after discharge.    Follow-up provider: NOEMI JUAREZ [[id number]]    Reason/Clinical Findings: post hospital evaluation    Describe mobility limitations that make leaving home difficult: weakness    Nursing/Therapeutic Services Requested: Other ([facility] to evaluate)    Frequency: 1 Week 1               Time spent on Discharge including face to face service:  35 minutes    Signature:    Electronically signed by Rakel Stoddard DO, 12/08/21, 10:55 AM EST.        "

## 2021-12-08 NOTE — CONSULTS
"Diabetes Education  Assessment/Teaching    Patient Name:  Carmella Pelayo  YOB: 1955  MRN: [id number]  Admit Date:  12/4/2021      Assessment Date:  12/8/2021    Most Recent Value   General Information     Referral From: A1c  [On 11/18/2021 A1c was 10.0%.]   Height 154.9 cm (61\")   Weight 61.1 kg (134 lb 11.2 oz)   Weight Method Bed scale   Pregnancy Assessment    Diabetes History    What type of diabetes do you have? Type 2   Current DM knowledge fair   Do you test your blood sugar at home? yes   Frequency of checks once a day   Meter type unsure of name but about a month old   Who performs the test? patient   Typical readings 120-150   Have you had high blood sugar? (>140mg/dl) yes   How often do you have high blood sugar? unknown   When was your last high blood sugar? Admission blood sugar 150   Education Preferences    What areas of diabetes would you like to learn about? avoiding high blood sugar,  diabetes complications,  diet information   Nutrition Information    Assessment Topics    Healthy Eating - Assessment Needs education   Problem Solving - Assessment Needs education   Reducing Risk - Assessment Needs education   DM Goals    Healthy Eating - Goal Today   Problem Solving - Goal Today   Reducing Risk - Goal Today            Most Recent Value   DM Education Needs    Meter Has own   Frequency of Testing Daily   Problem Solving Hyperglycemia,  Signs,  Symptoms,  Treatment   Reducing Risks A1C testing  [On 11/18/2021 A1c was 10.0%.]   Healthy Eating Basic meal plan provided   Motivation Moderate   Teaching Method Discussion,  Handouts   Patient Response Verbalized understanding            Other Comments:  A1c info sheet given with discussion on A1c target and healthy blood sugar range. Patient stated that she drinks regular Gatorade or Gatorade Zero or Lemonade Lite. Patient stated she has had a loss of appetite from food not tasting good which doctors tell her is from the antibiotics. " Discussed with patient about drinking only unsweetened beverages. Handouts given with discussion on 1 serving of carbs being = 15 grams, being allowed 4 servings  of carbs per meal, counting carbs, reading food labels, and meal planning. Patient stated she has been in bed mostly and doesn't get much exercise. Patient has no further questions or concerns related to diabetes at this time.        Electronically signed by:  Alivia Amaya RN  12/08/21 16:39 EST

## 2021-12-09 ENCOUNTER — TELEPHONE (OUTPATIENT)
Dept: BARIATRICS/WEIGHT MGMT | Facility: CLINIC | Age: 66
End: 2021-12-09

## 2021-12-09 LAB
BACTERIA SPEC AEROBE CULT: NORMAL
BACTERIA SPEC AEROBE CULT: NORMAL

## 2021-12-09 NOTE — TELEPHONE ENCOUNTER
Scheduled pt appt for three weeks out due to hopsital stay for previous appt. [facility] will be transporting pt to her appt. 12/8/21

## 2021-12-10 LAB
BACTERIA FLD CULT: NORMAL
GRAM STN SPEC: NORMAL
GRAM STN SPEC: NORMAL

## 2021-12-15 ENCOUNTER — TELEPHONE (OUTPATIENT)
Dept: ONCOLOGY | Facility: CLINIC | Age: 66
End: 2021-12-15

## 2021-12-15 NOTE — TELEPHONE ENCOUNTER
ATTEMPTED TO CONTACT PATIENT REGARDING HOSPITAL F/U APPT.  NO ANSWER.  UNABLE TO LMV.  SPOKE WITH PATIENT'S DAUGHTER EVELYN.  EVELYN STATES THERE IS NO NEED IN SCHEDULING THIS F/U APPT AS THEY WERE INFORMED THERE IS NOTHING ELSE THEY CAN DO FOR HER MOTHER.

## 2021-12-22 ENCOUNTER — APPOINTMENT (OUTPATIENT)
Dept: LAB | Facility: HOSPITAL | Age: 66
End: 2021-12-22

## 2022-01-07 ENCOUNTER — HOSPITAL ENCOUNTER (OUTPATIENT)
Dept: CT IMAGING | Facility: HOSPITAL | Age: 67
Discharge: HOME | End: 2022-01-07

## 2022-01-07 ENCOUNTER — OFFICE VISIT (OUTPATIENT)
Dept: BARIATRICS/WEIGHT MGMT | Facility: CLINIC | Age: 67
End: 2022-01-07

## 2022-01-07 ENCOUNTER — LAB (OUTPATIENT)
Dept: LAB | Facility: HOSPITAL | Age: 67
End: 2022-01-07

## 2022-01-07 VITALS
WEIGHT: 110 LBS | DIASTOLIC BLOOD PRESSURE: 87 MMHG | RESPIRATION RATE: 14 BRPM | HEART RATE: 111 BPM | OXYGEN SATURATION: 96 % | SYSTOLIC BLOOD PRESSURE: 145 MMHG | HEIGHT: 61 IN | BODY MASS INDEX: 20.77 KG/M2

## 2022-01-07 DIAGNOSIS — L02.91 ABSCESS: ICD-10-CM

## 2022-01-07 DIAGNOSIS — L02.91 ABSCESS: Primary | ICD-10-CM

## 2022-01-07 DIAGNOSIS — E66.01 OBESITY, CLASS III, BMI 40-49.9 (MORBID OBESITY): ICD-10-CM

## 2022-01-07 LAB
ALBUMIN SERPL-MCNC: 3.4 G/DL (ref 3.5–5.2)
ALBUMIN/GLOB SERPL: 0.6 G/DL
ALP SERPL-CCNC: 140 U/L (ref 39–117)
ALT SERPL W P-5'-P-CCNC: <5 U/L (ref 1–33)
ANION GAP SERPL CALCULATED.3IONS-SCNC: 11 MMOL/L (ref 5–15)
AST SERPL-CCNC: 13 U/L (ref 1–32)
BASOPHILS # BLD AUTO: 0.05 10*3/MM3 (ref 0–0.2)
BASOPHILS NFR BLD AUTO: 0.3 % (ref 0–1.5)
BILIRUB SERPL-MCNC: 0.3 MG/DL (ref 0–1.2)
BUN SERPL-MCNC: 12 MG/DL (ref 8–23)
BUN/CREAT SERPL: 12.4 (ref 7–25)
CALCIUM SPEC-SCNC: 15.2 MG/DL (ref 8.6–10.5)
CHLORIDE SERPL-SCNC: 88 MMOL/L (ref 98–107)
CO2 SERPL-SCNC: 32 MMOL/L (ref 22–29)
CREAT SERPL-MCNC: 0.97 MG/DL (ref 0.57–1)
DEPRECATED RDW RBC AUTO: 42 FL (ref 37–54)
EOSINOPHIL # BLD AUTO: 0.19 10*3/MM3 (ref 0–0.4)
EOSINOPHIL NFR BLD AUTO: 1.3 % (ref 0.3–6.2)
ERYTHROCYTE [DISTWIDTH] IN BLOOD BY AUTOMATED COUNT: 15.1 % (ref 12.3–15.4)
GFR SERPL CREATININE-BSD FRML MDRD: 57 ML/MIN/1.73
GLOBULIN UR ELPH-MCNC: 5.6 GM/DL
GLUCOSE SERPL-MCNC: 181 MG/DL (ref 65–99)
HCT VFR BLD AUTO: 39.3 % (ref 34–46.6)
HGB BLD-MCNC: 12.1 G/DL (ref 12–15.9)
IMM GRANULOCYTES # BLD AUTO: 0.07 10*3/MM3 (ref 0–0.05)
IMM GRANULOCYTES NFR BLD AUTO: 0.5 % (ref 0–0.5)
LYMPHOCYTES # BLD AUTO: 1.19 10*3/MM3 (ref 0.7–3.1)
LYMPHOCYTES NFR BLD AUTO: 8.3 % (ref 19.6–45.3)
MCH RBC QN AUTO: 24 PG (ref 26.6–33)
MCHC RBC AUTO-ENTMCNC: 30.8 G/DL (ref 31.5–35.7)
MCV RBC AUTO: 77.8 FL (ref 79–97)
MONOCYTES # BLD AUTO: 0.8 10*3/MM3 (ref 0.1–0.9)
MONOCYTES NFR BLD AUTO: 5.6 % (ref 5–12)
NEUTROPHILS NFR BLD AUTO: 12 10*3/MM3 (ref 1.7–7)
NEUTROPHILS NFR BLD AUTO: 84 % (ref 42.7–76)
NRBC BLD AUTO-RTO: 0 /100 WBC (ref 0–0.2)
PLATELET # BLD AUTO: 569 10*3/MM3 (ref 140–450)
PMV BLD AUTO: 10.4 FL (ref 6–12)
POTASSIUM SERPL-SCNC: 3.4 MMOL/L (ref 3.5–5.2)
PROT SERPL-MCNC: 9 G/DL (ref 6–8.5)
RBC # BLD AUTO: 5.05 10*6/MM3 (ref 3.77–5.28)
SODIUM SERPL-SCNC: 131 MMOL/L (ref 136–145)
WBC NRBC COR # BLD: 14.3 10*3/MM3 (ref 3.4–10.8)

## 2022-01-07 PROCEDURE — 74176 CT ABD & PELVIS W/O CONTRAST: CPT

## 2022-01-07 PROCEDURE — 99213 OFFICE O/P EST LOW 20 MIN: CPT | Performed by: SURGERY

## 2022-01-07 PROCEDURE — 85025 COMPLETE CBC W/AUTO DIFF WBC: CPT

## 2022-01-07 PROCEDURE — 36415 COLL VENOUS BLD VENIPUNCTURE: CPT

## 2022-01-07 PROCEDURE — 80053 COMPREHEN METABOLIC PANEL: CPT

## 2022-01-07 RX ORDER — HYDROCODONE BITARTRATE AND ACETAMINOPHEN 5; 325 MG/1; MG/1
1 TABLET ORAL EVERY 8 HOURS PRN
COMMUNITY

## 2022-01-07 RX ORDER — FLUCONAZOLE 100 MG/1
100 TABLET ORAL DAILY
COMMUNITY

## 2022-01-07 RX ORDER — OXYCODONE HYDROCHLORIDE 30 MG/1
30 TABLET, FILM COATED, EXTENDED RELEASE ORAL EVERY 12 HOURS SCHEDULED
Qty: 60 TABLET | Refills: 0 | Status: SHIPPED | OUTPATIENT
Start: 2022-01-07

## 2022-01-07 NOTE — CASE MANAGEMENT/SOCIAL WORK
Discharge Planning Assessment  [facility]     Patient Name: Carmella Pelayo  MRN: [id number]  Today's Date: 1/7/2022    Admit Date: 1/7/2022     Discharge Needs Assessment    No documentation.                Discharge Plan     Row Name 01/07/22 1715       Plan    Plan Comments Contacted by Bed Control RN Andre  stating patient is currently in radiology,but ready to return to [facility].Andre states Dr. Carpenter called her stating patient will need to return to facility by ambulance due to non-operable broken hip and  pain. Ambulance medical necessity form obtained, completed, and given to Andre. She  will contact [facility] for patient's transport back to [facility].She has also spoken with Milena Ty about this as well.                    Raina Alford RN, [facility]  Office: [phone]  Fax: [phone]  [email or web address]      Phone communication or documentation only - no physical contact with patient or family.        Raina Alford RN

## 2022-01-10 NOTE — PROGRESS NOTES
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        Assessment:   66-year-old lady recently hospitalized for large intra-abdominal abscess related to metastatic vulvar cancer.  She continues complain of a lot of left hip pain.       Plan:   I send her to radiology for CT scan.  After the CT scan was performed or reviewed it and saw that the abscess had resolved and therefore I did remove her KATIE drains.    Unfortunately the CT scan also describes an acetabular fracture on the left side which is a pathological fracture.  I spoke to Dr. Cole of Ortho and he says there is no treatment other than very light minimal weightbearing on the left side.  He said that she could get around on a walker.    I did prescribe her some pain medicine.  I sent a message to her nursing home about these findings.  She has terminal cancer and had previously been under hospice care and perhaps it is time that she return to hospice care.  "

## 2022-03-03 ENCOUNTER — INPATIENT HOSPITAL (OUTPATIENT)
Dept: URBAN - METROPOLITAN AREA HOSPITAL 76 | Facility: HOSPITAL | Age: 67
End: 2022-03-03

## 2022-03-03 DIAGNOSIS — E43 UNSPECIFIED SEVERE PROTEIN-CALORIE MALNUTRITION: ICD-10-CM

## 2022-03-03 DIAGNOSIS — Z93.1 GASTROSTOMY STATUS: ICD-10-CM

## 2022-03-03 DIAGNOSIS — N13.9 OBSTRUCTIVE AND REFLUX UROPATHY, UNSPECIFIED: ICD-10-CM

## 2022-03-03 DIAGNOSIS — C51.9 MALIGNANT NEOPLASM OF VULVA, UNSPECIFIED: ICD-10-CM

## 2022-03-03 DIAGNOSIS — R10.9 UNSPECIFIED ABDOMINAL PAIN: ICD-10-CM

## 2022-03-03 DIAGNOSIS — R63.30 FEEDING DIFFICULTIES, UNSPECIFIED: ICD-10-CM

## 2022-03-03 DIAGNOSIS — D64.9 ANEMIA, UNSPECIFIED: ICD-10-CM

## 2022-03-03 DIAGNOSIS — R11.2 NAUSEA WITH VOMITING, UNSPECIFIED: ICD-10-CM

## 2022-03-03 DIAGNOSIS — K59.00 CONSTIPATION, UNSPECIFIED: ICD-10-CM

## 2022-03-03 DIAGNOSIS — R19.00 INTRA-ABDOMINAL AND PELVIC SWELLING, MASS AND LUMP, UNSPECIF: ICD-10-CM

## 2022-03-03 PROCEDURE — 99222 1ST HOSP IP/OBS MODERATE 55: CPT | Performed by: INTERNAL MEDICINE

## 2022-03-04 ENCOUNTER — INPATIENT HOSPITAL (OUTPATIENT)
Dept: URBAN - METROPOLITAN AREA HOSPITAL 76 | Facility: HOSPITAL | Age: 67
End: 2022-03-04

## 2022-03-04 DIAGNOSIS — K29.51 UNSPECIFIED CHRONIC GASTRITIS WITH BLEEDING: ICD-10-CM

## 2022-03-04 DIAGNOSIS — R63.30 FEEDING DIFFICULTIES, UNSPECIFIED: ICD-10-CM

## 2022-03-04 PROCEDURE — 43246 EGD PLACE GASTROSTOMY TUBE: CPT | Performed by: INTERNAL MEDICINE

## 2022-03-04 PROCEDURE — 43239 EGD BIOPSY SINGLE/MULTIPLE: CPT | Mod: 59 | Performed by: INTERNAL MEDICINE

## 2022-03-05 PROCEDURE — 99232 SBSQ HOSP IP/OBS MODERATE 35: CPT | Performed by: INTERNAL MEDICINE
